# Patient Record
Sex: FEMALE | Race: WHITE | NOT HISPANIC OR LATINO | Employment: OTHER | RURAL
[De-identification: names, ages, dates, MRNs, and addresses within clinical notes are randomized per-mention and may not be internally consistent; named-entity substitution may affect disease eponyms.]

---

## 2022-12-02 ENCOUNTER — HOSPITAL ENCOUNTER (EMERGENCY)
Facility: HOSPITAL | Age: 52
Discharge: ANOTHER HOSPITAL | End: 2022-12-03
Payer: COMMERCIAL

## 2022-12-02 DIAGNOSIS — R07.9 CHEST PAIN: ICD-10-CM

## 2022-12-02 DIAGNOSIS — I21.4 NSTEMI (NON-ST ELEVATED MYOCARDIAL INFARCTION): Primary | ICD-10-CM

## 2022-12-02 LAB
BASOPHILS # BLD AUTO: 0.06 K/UL (ref 0–0.2)
BASOPHILS NFR BLD AUTO: 0.6 % (ref 0–1)
DIFFERENTIAL METHOD BLD: ABNORMAL
EOSINOPHIL # BLD AUTO: 0.2 K/UL (ref 0–0.5)
EOSINOPHIL NFR BLD AUTO: 1.9 % (ref 1–4)
ERYTHROCYTE [DISTWIDTH] IN BLOOD BY AUTOMATED COUNT: 13 % (ref 11.5–14.5)
HCT VFR BLD AUTO: 41.6 % (ref 38–47)
HGB BLD-MCNC: 14.4 G/DL (ref 12–16)
LYMPHOCYTES # BLD AUTO: 3.3 K/UL (ref 1–4.8)
LYMPHOCYTES NFR BLD AUTO: 32.1 % (ref 27–41)
MCH RBC QN AUTO: 29.6 PG (ref 27–31)
MCHC RBC AUTO-ENTMCNC: 34.6 G/DL (ref 32–36)
MCV RBC AUTO: 85.6 FL (ref 80–96)
MONOCYTES # BLD AUTO: 0.68 K/UL (ref 0–0.8)
MONOCYTES NFR BLD AUTO: 6.6 % (ref 2–6)
MPC BLD CALC-MCNC: 9.2 FL (ref 9.4–12.4)
NEUTROPHILS # BLD AUTO: 6.04 K/UL (ref 1.8–7.7)
NEUTROPHILS NFR BLD AUTO: 58.8 % (ref 53–65)
PLATELET # BLD AUTO: 325 K/UL (ref 150–400)
RBC # BLD AUTO: 4.86 M/UL (ref 4.2–5.4)
WBC # BLD AUTO: 10.28 K/UL (ref 4.5–11)

## 2022-12-02 PROCEDURE — 85025 COMPLETE CBC W/AUTO DIFF WBC: CPT | Performed by: NURSE PRACTITIONER

## 2022-12-02 PROCEDURE — 93005 ELECTROCARDIOGRAM TRACING: CPT

## 2022-12-02 PROCEDURE — 93010 EKG 12-LEAD: ICD-10-PCS | Mod: ,,, | Performed by: HOSPITALIST

## 2022-12-02 PROCEDURE — 99291 PR CRITICAL CARE, E/M 30-74 MINUTES: ICD-10-PCS | Mod: ,,, | Performed by: NURSE PRACTITIONER

## 2022-12-02 PROCEDURE — 25000003 PHARM REV CODE 250: Performed by: NURSE PRACTITIONER

## 2022-12-02 PROCEDURE — 99291 CRITICAL CARE FIRST HOUR: CPT | Mod: ,,, | Performed by: NURSE PRACTITIONER

## 2022-12-02 PROCEDURE — 93010 ELECTROCARDIOGRAM REPORT: CPT | Mod: ,,, | Performed by: HOSPITALIST

## 2022-12-02 PROCEDURE — 96360 HYDRATION IV INFUSION INIT: CPT

## 2022-12-02 PROCEDURE — 94761 N-INVAS EAR/PLS OXIMETRY MLT: CPT

## 2022-12-02 PROCEDURE — 99291 CRITICAL CARE FIRST HOUR: CPT

## 2022-12-02 RX ORDER — ASPIRIN 325 MG
325 TABLET ORAL
Status: COMPLETED | OUTPATIENT
Start: 2022-12-02 | End: 2022-12-02

## 2022-12-02 RX ADMIN — ASPIRIN 325 MG ORAL TABLET 325 MG: 325 PILL ORAL at 11:12

## 2022-12-03 ENCOUNTER — HOSPITAL ENCOUNTER (OUTPATIENT)
Facility: HOSPITAL | Age: 52
Discharge: LEFT BEFORE DISCHARGE | End: 2022-12-03
Attending: FAMILY MEDICINE | Admitting: FAMILY MEDICINE
Payer: COMMERCIAL

## 2022-12-03 VITALS
OXYGEN SATURATION: 97 % | RESPIRATION RATE: 18 BRPM | TEMPERATURE: 98 F | DIASTOLIC BLOOD PRESSURE: 64 MMHG | SYSTOLIC BLOOD PRESSURE: 95 MMHG | HEART RATE: 60 BPM | BODY MASS INDEX: 24.8 KG/M2 | WEIGHT: 140 LBS | HEIGHT: 63 IN

## 2022-12-03 VITALS
HEIGHT: 63 IN | RESPIRATION RATE: 17 BRPM | BODY MASS INDEX: 24.8 KG/M2 | SYSTOLIC BLOOD PRESSURE: 116 MMHG | DIASTOLIC BLOOD PRESSURE: 71 MMHG | OXYGEN SATURATION: 98 % | TEMPERATURE: 98 F | WEIGHT: 140 LBS | HEART RATE: 68 BPM

## 2022-12-03 DIAGNOSIS — I24.9 ACS (ACUTE CORONARY SYNDROME): ICD-10-CM

## 2022-12-03 DIAGNOSIS — R79.89 TROPONIN I ABOVE REFERENCE RANGE: ICD-10-CM

## 2022-12-03 DIAGNOSIS — I21.4 NSTEMI (NON-ST ELEVATED MYOCARDIAL INFARCTION): Primary | ICD-10-CM

## 2022-12-03 DIAGNOSIS — R07.9 CHEST PAIN: ICD-10-CM

## 2022-12-03 PROBLEM — F17.200 TOBACCO DEPENDENCE: Status: ACTIVE | Noted: 2022-12-03

## 2022-12-03 LAB
ALBUMIN SERPL BCP-MCNC: 3.5 G/DL (ref 3.5–5)
ALBUMIN/GLOB SERPL: 1 {RATIO}
ALP SERPL-CCNC: 74 U/L (ref 41–108)
ALT SERPL W P-5'-P-CCNC: 21 U/L (ref 13–56)
ANION GAP SERPL CALCULATED.3IONS-SCNC: 11 MMOL/L (ref 7–16)
AORTIC VALVE CUSP SEPERATION: 1.84 CM
APTT PPP: 26.2 SECONDS (ref 25.2–37.3)
APTT PPP: 27.9 SECONDS (ref 25.2–37.3)
APTT PPP: 40.7 SECONDS (ref 25.2–37.3)
AST SERPL W P-5'-P-CCNC: 13 U/L (ref 15–37)
BASOPHILS # BLD AUTO: 0.06 K/UL (ref 0–0.2)
BASOPHILS NFR BLD AUTO: 0.7 % (ref 0–1)
BILIRUB SERPL-MCNC: 0.2 MG/DL (ref ?–1.2)
BSA FOR ECHO PROCEDURE: 1.68 M2
BUN SERPL-MCNC: 16 MG/DL (ref 7–18)
BUN/CREAT SERPL: 17 (ref 6–20)
CALCIUM SERPL-MCNC: 8.6 MG/DL (ref 8.5–10.1)
CHLORIDE SERPL-SCNC: 104 MMOL/L (ref 98–107)
CHOLEST SERPL-MCNC: 234 MG/DL (ref 0–200)
CHOLEST/HDLC SERPL: 4.7 {RATIO}
CO2 SERPL-SCNC: 29 MMOL/L (ref 21–32)
CREAT SERPL-MCNC: 0.93 MG/DL (ref 0.55–1.02)
CV ECHO LV RWT: 0.37 CM
D DIMER PPP FEU-MCNC: <0.27 ΜG/ML (ref 0–0.47)
DIFFERENTIAL METHOD BLD: ABNORMAL
E WAVE DECELERATION TIME: 225 MSEC
ECHO LV POSTERIOR WALL: 0.77 CM (ref 0.6–1.1)
EGFR (NO RACE VARIABLE) (RUSH/TITUS): 73 ML/MIN/1.73M²
EJECTION FRACTION: 55 %
EOSINOPHIL # BLD AUTO: 0.14 K/UL (ref 0–0.5)
EOSINOPHIL NFR BLD AUTO: 1.7 % (ref 1–4)
ERYTHROCYTE [DISTWIDTH] IN BLOOD BY AUTOMATED COUNT: 13.1 % (ref 11.5–14.5)
EST. AVERAGE GLUCOSE BLD GHB EST-MCNC: 130 MG/DL
FRACTIONAL SHORTENING: 17 % (ref 28–44)
GLOBULIN SER-MCNC: 3.5 G/DL (ref 2–4)
GLUCOSE SERPL-MCNC: 118 MG/DL (ref 74–106)
HBA1C MFR BLD HPLC: 6.5 % (ref 4.5–6.6)
HCT VFR BLD AUTO: 42.3 % (ref 38–47)
HDLC SERPL-MCNC: 50 MG/DL (ref 40–60)
HGB BLD-MCNC: 13.4 G/DL (ref 12–16)
IMM GRANULOCYTES # BLD AUTO: 0.02 K/UL (ref 0–0.04)
IMM GRANULOCYTES NFR BLD: 0.2 % (ref 0–0.4)
INR BLD: 0.9
INR BLD: 0.92
INR BLD: 1.03
INTERVENTRICULAR SEPTUM: 0.82 CM (ref 0.6–1.1)
LDLC SERPL CALC-MCNC: 131 MG/DL
LDLC/HDLC SERPL: 2.6 {RATIO}
LEFT ATRIUM SIZE: 2.8 CM
LEFT INTERNAL DIMENSION IN SYSTOLE: 3.41 CM (ref 2.1–4)
LEFT VENTRICLE DIASTOLIC VOLUME INDEX: 45.23 ML/M2
LEFT VENTRICLE DIASTOLIC VOLUME: 75.08 ML
LEFT VENTRICLE MASS INDEX: 59 G/M2
LEFT VENTRICLE SYSTOLIC VOLUME INDEX: 28.8 ML/M2
LEFT VENTRICLE SYSTOLIC VOLUME: 47.77 ML
LEFT VENTRICULAR INTERNAL DIMENSION IN DIASTOLE: 4.12 CM (ref 3.5–6)
LEFT VENTRICULAR MASS: 97.31 G
LYMPHOCYTES # BLD AUTO: 2.18 K/UL (ref 1–4.8)
LYMPHOCYTES NFR BLD AUTO: 26.4 % (ref 27–41)
MAGNESIUM SERPL-MCNC: 1.8 MG/DL (ref 1.7–2.3)
MCH RBC QN AUTO: 28.9 PG (ref 27–31)
MCHC RBC AUTO-ENTMCNC: 31.7 G/DL (ref 32–36)
MCV RBC AUTO: 91.2 FL (ref 80–96)
MONOCYTES # BLD AUTO: 0.42 K/UL (ref 0–0.8)
MONOCYTES NFR BLD AUTO: 5.1 % (ref 2–6)
MPC BLD CALC-MCNC: 9.4 FL (ref 9.4–12.4)
MV PEAK E VEL: 0.77 M/S
NEUTROPHILS # BLD AUTO: 5.44 K/UL (ref 1.8–7.7)
NEUTROPHILS NFR BLD AUTO: 65.9 % (ref 53–65)
NONHDLC SERPL-MCNC: 184 MG/DL
NRBC # BLD AUTO: 0 X10E3/UL
NRBC, AUTO (.00): 0 %
NT-PROBNP SERPL-MCNC: 1117 PG/ML (ref 1–125)
PISA TR MAX VEL: 3.16 M/S
PLATELET # BLD AUTO: 329 K/UL (ref 150–400)
POTASSIUM SERPL-SCNC: 3.5 MMOL/L (ref 3.5–5.1)
PROT SERPL-MCNC: 7 G/DL (ref 6.4–8.2)
PROTHROMBIN TIME: 12 SECONDS (ref 11.7–14.7)
PROTHROMBIN TIME: 12.1 SECONDS (ref 11.7–14.7)
PROTHROMBIN TIME: 13.1 SECONDS (ref 11.7–14.7)
RBC # BLD AUTO: 4.64 M/UL (ref 4.2–5.4)
RIGHT ATRIAL AREA: 6.6 CM2
RIGHT VENTRICULAR END-DIASTOLIC DIMENSION: 2.45 CM
SARS-COV+SARS-COV-2 AG RESP QL IA.RAPID: NEGATIVE
SODIUM SERPL-SCNC: 140 MMOL/L (ref 136–145)
TR MAX PG: 40 MMHG
TRIGL SERPL-MCNC: 264 MG/DL (ref 35–150)
TROPONIN I SERPL HS-MCNC: 241.2 PG/ML
TROPONIN I SERPL HS-MCNC: 2747.9 PG/ML
TROPONIN I SERPL HS-MCNC: 487.8 PG/ML
TSH SERPL DL<=0.005 MIU/L-ACNC: 2.34 UIU/ML (ref 0.36–3.74)
VLDLC SERPL-MCNC: 53 MG/DL
WBC # BLD AUTO: 8.26 K/UL (ref 4.5–11)

## 2022-12-03 PROCEDURE — 93010 EKG 12-LEAD: ICD-10-PCS | Mod: ,,, | Performed by: HOSPITALIST

## 2022-12-03 PROCEDURE — 85025 COMPLETE CBC W/AUTO DIFF WBC: CPT | Performed by: EMERGENCY MEDICINE

## 2022-12-03 PROCEDURE — 80061 LIPID PANEL: CPT | Performed by: NURSE PRACTITIONER

## 2022-12-03 PROCEDURE — 85610 PROTHROMBIN TIME: CPT | Mod: 91 | Performed by: FAMILY MEDICINE

## 2022-12-03 PROCEDURE — 99219 PR INITIAL OBSERVATION CARE,LEVL II: CPT | Mod: ,,, | Performed by: FAMILY MEDICINE

## 2022-12-03 PROCEDURE — G0378 HOSPITAL OBSERVATION PER HR: HCPCS

## 2022-12-03 PROCEDURE — 87426 SARSCOV CORONAVIRUS AG IA: CPT | Performed by: EMERGENCY MEDICINE

## 2022-12-03 PROCEDURE — 85610 PROTHROMBIN TIME: CPT | Performed by: NURSE PRACTITIONER

## 2022-12-03 PROCEDURE — 96375 TX/PRO/DX INJ NEW DRUG ADDON: CPT | Mod: 59

## 2022-12-03 PROCEDURE — 36415 COLL VENOUS BLD VENIPUNCTURE: CPT | Performed by: FAMILY MEDICINE

## 2022-12-03 PROCEDURE — 25000003 PHARM REV CODE 250: Performed by: FAMILY MEDICINE

## 2022-12-03 PROCEDURE — 25000003 PHARM REV CODE 250: Performed by: EMERGENCY MEDICINE

## 2022-12-03 PROCEDURE — 83880 ASSAY OF NATRIURETIC PEPTIDE: CPT | Performed by: FAMILY MEDICINE

## 2022-12-03 PROCEDURE — 80053 COMPREHEN METABOLIC PANEL: CPT | Performed by: NURSE PRACTITIONER

## 2022-12-03 PROCEDURE — 96374 THER/PROPH/DIAG INJ IV PUSH: CPT | Mod: 59

## 2022-12-03 PROCEDURE — 99285 EMERGENCY DEPT VISIT HI MDM: CPT | Mod: 25

## 2022-12-03 PROCEDURE — 99284 PR EMERGENCY DEPT VISIT,LEVEL IV: ICD-10-PCS | Mod: CS,,, | Performed by: FAMILY MEDICINE

## 2022-12-03 PROCEDURE — 93010 ELECTROCARDIOGRAM REPORT: CPT | Mod: ,,, | Performed by: HOSPITALIST

## 2022-12-03 PROCEDURE — 36415 COLL VENOUS BLD VENIPUNCTURE: CPT | Performed by: EMERGENCY MEDICINE

## 2022-12-03 PROCEDURE — 85379 FIBRIN DEGRADATION QUANT: CPT | Performed by: NURSE PRACTITIONER

## 2022-12-03 PROCEDURE — 84484 ASSAY OF TROPONIN QUANT: CPT | Performed by: EMERGENCY MEDICINE

## 2022-12-03 PROCEDURE — 84484 ASSAY OF TROPONIN QUANT: CPT | Performed by: NURSE PRACTITIONER

## 2022-12-03 PROCEDURE — 93005 ELECTROCARDIOGRAM TRACING: CPT

## 2022-12-03 PROCEDURE — 25000003 PHARM REV CODE 250: Performed by: NURSE PRACTITIONER

## 2022-12-03 PROCEDURE — 99284 EMERGENCY DEPT VISIT MOD MDM: CPT | Mod: CS,,, | Performed by: FAMILY MEDICINE

## 2022-12-03 PROCEDURE — 85730 THROMBOPLASTIN TIME PARTIAL: CPT | Performed by: EMERGENCY MEDICINE

## 2022-12-03 PROCEDURE — 99219 PR INITIAL OBSERVATION CARE,LEVL II: ICD-10-PCS | Mod: ,,, | Performed by: FAMILY MEDICINE

## 2022-12-03 PROCEDURE — 85730 THROMBOPLASTIN TIME PARTIAL: CPT | Performed by: NURSE PRACTITIONER

## 2022-12-03 PROCEDURE — 83735 ASSAY OF MAGNESIUM: CPT | Performed by: NURSE PRACTITIONER

## 2022-12-03 PROCEDURE — 84443 ASSAY THYROID STIM HORMONE: CPT | Performed by: FAMILY MEDICINE

## 2022-12-03 PROCEDURE — 85730 THROMBOPLASTIN TIME PARTIAL: CPT | Mod: 91 | Performed by: FAMILY MEDICINE

## 2022-12-03 PROCEDURE — 63600175 PHARM REV CODE 636 W HCPCS: Performed by: EMERGENCY MEDICINE

## 2022-12-03 PROCEDURE — 83036 HEMOGLOBIN GLYCOSYLATED A1C: CPT | Performed by: FAMILY MEDICINE

## 2022-12-03 PROCEDURE — 85610 PROTHROMBIN TIME: CPT | Performed by: EMERGENCY MEDICINE

## 2022-12-03 RX ORDER — ACETAMINOPHEN 325 MG/1
650 TABLET ORAL EVERY 6 HOURS PRN
Status: DISCONTINUED | OUTPATIENT
Start: 2022-12-03 | End: 2022-12-03 | Stop reason: HOSPADM

## 2022-12-03 RX ORDER — NAPROXEN SODIUM 220 MG/1
81 TABLET, FILM COATED ORAL DAILY
Status: DISCONTINUED | OUTPATIENT
Start: 2022-12-04 | End: 2022-12-03 | Stop reason: HOSPADM

## 2022-12-03 RX ORDER — HEPARIN SODIUM,PORCINE/D5W 25000/250
0-40 INTRAVENOUS SOLUTION INTRAVENOUS CONTINUOUS
Status: DISCONTINUED | OUTPATIENT
Start: 2022-12-03 | End: 2022-12-03 | Stop reason: HOSPADM

## 2022-12-03 RX ORDER — LIDOCAINE HYDROCHLORIDE 20 MG/ML
15 SOLUTION OROPHARYNGEAL ONCE
Status: COMPLETED | OUTPATIENT
Start: 2022-12-03 | End: 2022-12-03

## 2022-12-03 RX ORDER — MAG HYDROX/ALUMINUM HYD/SIMETH 200-200-20
30 SUSPENSION, ORAL (FINAL DOSE FORM) ORAL ONCE
Status: COMPLETED | OUTPATIENT
Start: 2022-12-03 | End: 2022-12-03

## 2022-12-03 RX ORDER — TALC
9 POWDER (GRAM) TOPICAL NIGHTLY PRN
Status: DISCONTINUED | OUTPATIENT
Start: 2022-12-03 | End: 2022-12-03 | Stop reason: HOSPADM

## 2022-12-03 RX ORDER — CARVEDILOL 3.12 MG/1
3.12 TABLET ORAL 2 TIMES DAILY
Status: DISCONTINUED | OUTPATIENT
Start: 2022-12-03 | End: 2022-12-03 | Stop reason: HOSPADM

## 2022-12-03 RX ORDER — SODIUM CHLORIDE 9 MG/ML
1000 INJECTION, SOLUTION INTRAVENOUS
Status: COMPLETED | OUTPATIENT
Start: 2022-12-03 | End: 2022-12-03

## 2022-12-03 RX ORDER — SODIUM CHLORIDE 9 MG/ML
1000 INJECTION, SOLUTION INTRAVENOUS
Status: DISCONTINUED | OUTPATIENT
Start: 2022-12-03 | End: 2022-12-03

## 2022-12-03 RX ORDER — TRAZODONE HYDROCHLORIDE 50 MG/1
50 TABLET ORAL NIGHTLY PRN
Status: DISCONTINUED | OUTPATIENT
Start: 2022-12-03 | End: 2022-12-03 | Stop reason: HOSPADM

## 2022-12-03 RX ORDER — ATORVASTATIN CALCIUM 80 MG/1
80 TABLET, FILM COATED ORAL DAILY
Status: DISCONTINUED | OUTPATIENT
Start: 2022-12-03 | End: 2022-12-03 | Stop reason: HOSPADM

## 2022-12-03 RX ORDER — ONDANSETRON 2 MG/ML
4 INJECTION INTRAMUSCULAR; INTRAVENOUS ONCE
Status: COMPLETED | OUTPATIENT
Start: 2022-12-03 | End: 2022-12-03

## 2022-12-03 RX ADMIN — ONDANSETRON 4 MG: 2 INJECTION INTRAMUSCULAR; INTRAVENOUS at 03:12

## 2022-12-03 RX ADMIN — ATORVASTATIN CALCIUM 80 MG: 80 TABLET, FILM COATED ORAL at 12:12

## 2022-12-03 RX ADMIN — ALUMINUM HYDROXIDE, MAGNESIUM HYDROXIDE, AND SIMETHICONE 30 ML: 200; 200; 20 SUSPENSION ORAL at 03:12

## 2022-12-03 RX ADMIN — LIDOCAINE HYDROCHLORIDE 15 ML: 20 SOLUTION ORAL at 03:12

## 2022-12-03 RX ADMIN — CARVEDILOL 3.12 MG: 3.12 TABLET, FILM COATED ORAL at 12:12

## 2022-12-03 RX ADMIN — SODIUM CHLORIDE 500 ML: 9 INJECTION, SOLUTION INTRAVENOUS at 01:12

## 2022-12-03 RX ADMIN — HEPARIN SODIUM 12 UNITS/KG/HR: 10000 INJECTION, SOLUTION INTRAVENOUS at 05:12

## 2022-12-03 RX ADMIN — SODIUM CHLORIDE 1000 ML: 9 INJECTION, SOLUTION INTRAVENOUS at 02:12

## 2022-12-03 NOTE — H&P
"[facility] - Emergency Department  Hospital Medicine  History & Physical    Patient Name: Johana Higgins  MRN: [id number]  Patient Class: OP- Observation  Admission Date: 12/3/2022  Attending Physician: Sesar Cordero Jr., MD   Primary Care Provider: Primary Doctor No         Patient information was obtained from patient, relative(s) and ER records.     Subjective:     Principal Problem:ACS (acute coronary syndrome)    Chief Complaint:   Chief Complaint   Patient presents with    Chest Pain        HPI: Pt is a 52 y.o. female presenting with 2 nights of chest pressure. She describes an " aggravating" sensation radiaiting from her L ear into her L armpit and across her chest.Patient reports that over the last week she has been taking more Gax X pills thinking she had gastric reflux and if she belched she would feel better. Patient reports the sensation to want to belch never subsided and she started to feel more pressure in her chest keeping her up at night. She reports some associated nausea, diaphoresis, dyspnea, orthopnea but denies PND defainting spells 2 years ago She denies HA, blurry vision, dysphagia, or diarrhea.    Pt has no known medical problems and takes no daily medication. Pt is has been a 1 ppd smoker since the age of 12. She denies EtOH consumption. She occasionally smokes marijuana. 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 Patient is very independent and use to carrying out all MRADLs with no assistance.    Workup thus far has been significant for the following: Initial Vitals T. 97.9, /68, P 70, RR 18 O2 96%. Initial troponin was 241.2; subsequent troponins were 487.8 followed by 2,747.9. EKG reveals sinus rhythm with a rate of 66.  CXR reveals no acute cardiopulmonary process. CBC and CMP are grossly nl.     Pt is being admitted to the hospital medicine service under attending physician Dr. Cordero for the evaluation and management of NSTE-ACS. "       History reviewed. No pertinent past medical history.    History reviewed. No pertinent surgical history.    Review of patient's allergies indicates:   Allergen Reactions    Penicillin Hives       Current Facility-Administered Medications on File Prior to Encounter   Medication    [COMPLETED] 0.9%  NaCl infusion    [COMPLETED] aspirin tablet 325 mg    [COMPLETED] sodium chloride 0.9% bolus 500 mL    [DISCONTINUED] 0.9%  NaCl infusion     No current outpatient medications on file prior to encounter.     Family History       Problem Relation (Age of Onset)    Heart attack Father, Brother, Maternal Grandfather    Heart disease Paternal Grandfather          Tobacco Use    Smoking status: Every Day     Types: Cigarettes    Smokeless tobacco: Not on file   Substance and Sexual Activity    Alcohol use: Never    Drug use: Yes     Types: Marijuana    Sexual activity: Yes     Review of Systems   Constitutional:  Negative for fatigue and fever.   HENT:  Negative for congestion, facial swelling, hearing loss and mouth sores.    Eyes:  Negative for pain, discharge and itching.   Respiratory:  Positive for shortness of breath. Negative for cough.    Cardiovascular:  Positive for chest pain. Negative for leg swelling.   Gastrointestinal:  Negative for abdominal distention, abdominal pain, anal bleeding, constipation and diarrhea.   Endocrine: Negative for cold intolerance, heat intolerance and polydipsia.   Genitourinary:  Negative for difficulty urinating, dyspareunia, enuresis and flank pain.   Musculoskeletal: Negative.    Skin: Negative.    Allergic/Immunologic: Negative.    Neurological:  Negative for light-headedness and headaches.   Hematological: Negative.    Psychiatric/Behavioral:  Negative for agitation, behavioral problems and confusion.    Objective:     Vital Signs (Most Recent):  Temp: 97.9 °F (36.6 °C) (12/03/22 0339)  Pulse: 68 (12/03/22 0619)  Resp: 17 (12/03/22 0619)  BP: 116/71 (12/03/22  0619)  SpO2: 98 % (12/03/22 0619) Vital Signs (24h Range):  Temp:  [97.8 °F (36.6 °C)-97.9 °F (36.6 °C)] 97.9 °F (36.6 °C)  Pulse:  [56-89] 68  Resp:  [15-18] 17  SpO2:  [95 %-98 %] 98 %  BP: ()/(50-98) 116/71     Weight: 63.5 kg (140 lb)  Body mass index is 24.8 kg/m².    Physical Exam  Constitutional:       Appearance: She is normal weight.   HENT:      Head: Normocephalic.      Right Ear: Tympanic membrane normal.      Left Ear: Tympanic membrane normal.      Nose: Nose normal.      Mouth/Throat:      Mouth: Mucous membranes are moist.      Pharynx: Oropharynx is clear.   Eyes:      Conjunctiva/sclera: Conjunctivae normal.      Pupils: Pupils are equal, round, and reactive to light.   Cardiovascular:      Rate and Rhythm: Normal rate and regular rhythm.      Pulses: Normal pulses.      Heart sounds: Normal heart sounds.   Pulmonary:      Effort: Pulmonary effort is normal.      Breath sounds: Normal breath sounds.   Abdominal:      General: Abdomen is flat. Bowel sounds are normal.   Musculoskeletal:         General: Normal range of motion.      Cervical back: Normal range of motion.   Skin:     General: Skin is warm and dry.      Capillary Refill: Capillary refill takes less than 2 seconds.   Neurological:      Mental Status: She is alert and oriented to person, place, and time.   Psychiatric:         Mood and Affect: Mood normal.         CRANIAL NERVES     CN III, IV, VI   Pupils are equal, round, and reactive to light.     Significant Labs: All pertinent labs within the past 24 hours have been reviewed.  BMP:   Recent Labs   Lab 12/03/22  0003   *      K 3.5      CO2 29   BUN 16   CREATININE 0.93   CALCIUM 8.6   MG 1.8     CBC:   Recent Labs   Lab 12/02/22  2351 12/03/22  0359   WBC 10.28 8.26   HGB 14.4 13.4   HCT 41.6 42.3    329       Significant Imaging: I have reviewed all pertinent imaging results/findings within the past 24 hours.    Assessment/Plan:     * NSTE-ACS  GREGORY  Score-82 points: 2% probability of death from admission to 6 months; HEART Score of 7 points-Risk of MACE 50-65%  Cardio consult placed; recs appreciated   Heparin infusion, Beta Blocker, statin, ASA    ECHO  Telemetry   Npo for now; awaiting cardio recs   PT/OT evaluation and tx         Tobacco dependence  Smoking Cessation information given        VTE Risk Mitigation (From admission, onward)         Ordered     heparin 25,000 units in dextrose 5% 250 mL (100 units/mL) infusion LOW INTENSITY nomogram - [facility]  Continuous        Question:  Begin at (in units/kg/hr)  Answer:  12    12/03/22 0438     heparin 25,000 units in dextrose 5% (100 units/ml) IV bolus from bag - ADDITIONAL PRN BOLUS - 30 units/kg (max bolus 4000 units)  As needed (PRN)        Question:  Heparin Infusion Adjustment (DO NOT MODIFY ANSWER)  Answer:  \\[facility]\epic\Images\Pharmacy\HeparinInfusions\heparin LOW INTENSITY nomogram for [place] [id number]    12/03/22 0438     heparin 25,000 units in dextrose 5% (100 units/ml) IV bolus from bag ADDITIONAL PRN BOLUS - 60 units/kg (max bolus 4000 units)  As needed (PRN)        Question:  Heparin Infusion Adjustment (DO NOT MODIFY ANSWER)  Answer:  \\[facility]\epic\Images\Pharmacy\HeparinInfusions\heparin LOW INTENSITY nomogram for [place] [id number].pdf    12/03/22 0438                   Jesús Currie MD  Department of Hospital Medicine   [facility] - Emergency Department

## 2022-12-03 NOTE — ED PROVIDER NOTES
Encounter Date: 12/2/2022       History     Chief Complaint   Patient presents with    Chest Pain     52 year old  female presents to the ER for evaluation for chest pain, SOB, nausea and sweats.  Pt reports she first noted these symptoms when she was sitting at home last night.  She reports the symptoms went away.  She went to work today, and when she got home tonight, the symptoms returned.  She described the chest pain as tightness under her left arm that radiates across her chest, into the left side of her neck and left ear.     The history is provided by the patient.   Chest Pain  Chest pain occurs intermittently. The chest pain is unchanged. The quality of the pain is described as tightness. The pain radiates to the left neck (left ear). Primary symptoms include shortness of breath, nausea and dizziness. Pertinent negatives for primary symptoms include no fever, no fatigue, no syncope, no cough, no wheezing, no palpitations, no abdominal pain, no vomiting and no altered mental status.   The patient's medical history does not include CHF, COPD, asthma or chronic lung disease.   Dizziness also occurs with nausea and diaphoresis. Dizziness does not occur with blurred vision, tinnitus, hearing loss, vomiting or weakness.   Associated symptoms include diaphoresis.   Pertinent negatives for associated symptoms include no claudication, no lower extremity edema, no near-syncope, no numbness, no orthopnea and no weakness. She tried nothing for the symptoms.   Pertinent negatives for past medical history include no aneurysm, no anxiety/panic attacks, no aortic aneurysm, no aortic dissection, no arrhythmia, no bicuspid aortic valve, no CAD, no cancer, no congenital heart disease, no connective tissue disease, no COPD, no CHF, no diabetes, no DVT, no hyperlipidemia, no hypertension, no Marfan's syndrome, no MI, no mitral valve prolapse, no pacemaker, no PE, no PVD, no recent injury, no rheumatic fever, no  seizures, no sickle cell disease, no sleep apnea, no spontaneous pneumothorax, no stimulant use, no strokes, no thyroid problem, no TIA and no valve disorder.   Procedure history is negative for cardiac catheterization, echocardiogram, persantine thallium, stress echo, stress thallium, exercise treadmill test and coronary CTA.   Review of patient's allergies indicates:   Allergen Reactions    Penicillin Hives     History reviewed. No pertinent past medical history.  No past surgical history on file.  History reviewed. No pertinent family history.     Review of Systems   Constitutional:  Positive for diaphoresis. Negative for fatigue and fever.   HENT:  Negative for tinnitus.    Eyes:  Negative for blurred vision.   Respiratory:  Positive for shortness of breath. Negative for cough and wheezing.    Cardiovascular:  Positive for chest pain. Negative for palpitations, orthopnea, claudication, leg swelling, syncope and near-syncope.   Gastrointestinal:  Positive for nausea. Negative for abdominal pain and vomiting.   Neurological:  Positive for dizziness. Negative for seizures, weakness and numbness.     Physical Exam     Initial Vitals [12/02/22 2333]   BP Pulse Resp Temp SpO2   (!) 132/98 89 18 97.8 °F (36.6 °C) 96 %      MAP       --         Physical Exam    Nursing note and vitals reviewed.  Constitutional: She appears well-developed and well-nourished. She is not diaphoretic. She is cooperative.  Non-toxic appearance. No distress.   HENT:   Head: Normocephalic and atraumatic.   Eyes: Pupils are equal, round, and reactive to light.   Neck: Neck supple.   Cardiovascular:  Normal rate, regular rhythm, S1 normal, S2 normal, normal heart sounds, intact distal pulses and normal pulses.     Exam reveals no gallop, no distant heart sounds and no friction rub.       No murmur heard.  Pulmonary/Chest: Breath sounds normal. No respiratory distress. She has no wheezes. She has no rhonchi. She has no rales. She exhibits no  tenderness.   Musculoskeletal:      Cervical back: Neck supple.     Neurological: She is alert and oriented to person, place, and time.   Skin: Skin is warm and dry. Capillary refill takes less than 2 seconds.   Psychiatric: She has a normal mood and affect.       Medical Screening Exam   See Full Note    ED Course   Procedures  Labs Reviewed   COMPREHENSIVE METABOLIC PANEL - Abnormal; Notable for the following components:       Result Value    Glucose 118 (*)     AST 13 (*)     All other components within normal limits   TROPONIN I - Abnormal; Notable for the following components:    Troponin I High Sensitivity 241.2 (*)     All other components within normal limits   CBC WITH DIFFERENTIAL - Abnormal; Notable for the following components:    MPV 9.2 (*)     Monocytes % 6.6 (*)     All other components within normal limits   TROPONIN I - Abnormal; Notable for the following components:    Troponin I High Sensitivity 487.8 (*)     All other components within normal limits   MAGNESIUM - Normal   APTT - Normal   PROTIME-INR - Normal   D DIMER, QUANTITATIVE - Normal   CBC W/ AUTO DIFFERENTIAL    Narrative:     The following orders were created for panel order CBC auto differential.  Procedure                               Abnormality         Status                     ---------                               -----------         ------                     CBC with Differential[[id number]]        Abnormal            Final result                 Please view results for these tests on the individual orders.   LIPID PANEL     EKG Readings: (Independently Interpreted)   Initial Reading: No STEMI. Rhythm: Normal Sinus Rhythm. Heart Rate: 86. Ectopy: No Ectopy. Conduction: Normal. ST Segments: Normal ST Segments. T Waves: Normal. Clinical Impression: Normal Sinus Rhythm   ECG Results              EKG 12-lead (In process)  Result time 12/02/22 23:58:42      In process by Interface, Lab In [facility] (12/02/22 23:58:42)                    Narrative:    Test Reason : R07.9,    Vent. Rate : 086 BPM     Atrial Rate : 086 BPM     P-R Int : 130 ms          QRS Dur : 078 ms      QT Int : 384 ms       P-R-T Axes : 076 073 093 degrees     QTc Int : 459 ms    Normal sinus rhythm  Nonspecific ST abnormality  Abnormal ECG  No previous ECGs available    Referred By:             Confirmed By:                                   Imaging Results              X-Ray Chest AP Portable (Preliminary result)  Result time 12/03/22 00:20:22      ED Interpretation by DIVYA Kirk (12/03/22 00:20:22, [facility] - Emergency Department, Emergency Medicine)    No acute chest process per VR                                     Medications   aspirin tablet 325 mg (325 mg Oral Given 12/2/22 4214)   sodium chloride 0.9% bolus 500 mL (0 mLs Intravenous Stopped 12/3/22 0215)   0.9%  NaCl infusion (1,000 mLs Intravenous New Bag 12/3/22 0214)        Additional MDM:   Heart Score:    History:          Highly suspicious.  ECG:             Normal  Age:               45-65 years  Risk factors: no risk factors known  Troponin:       >2x normal limit  Final Score: 5      ALICE Score:   Age over 65:                                    0.00   > or = to 3 CAD risk factors:           0.00  Established CAD:                            0.00  > or = to 2 anginal events in the past 24 hours: 1.00  Use of ASA in past 7 days:              0.00  Elevated Enzymes:                         1.00  ST Depression > or = to 0.05 mV:  0.00  ALICE score: 2    Heart Failure Score:   COPD = No       Attending Attestation:         Attending Critical Care:   Critical Care Times:   Direct Patient Care (initial evaluation, reassessments, and time considering the case)................................................................20 minutes.   Additional History from reviewing old medical records or taking additional history from the family, EMS, PCP, etc.......................10 minutes.   Ordering,  Reviewing, and Interpreting Diagnostic Studies...............................................................................................................10 minutes.   Documentation..................................................................................................................................................................................10 minutes.   Consultation with other Physicians. .................................................................................................................................................10 minutes.   Consultation with the patient's family directly relating to the patient's condition, care, and DNR status (when patient unable)......0 minutes.   Other..................................................................................................................................................................................................0 minutes.   ==============================================================  Total Critical Care Time - exclusive of procedural time: 60 minutes.  ==============================================================  Critical care was necessary to treat or prevent imminent or life-threatening deterioration of the following conditions: myocardial infarction.   Critical care was time spent personally by me on the following activities: obtaining history from patient or relative, examination of patient, review of x-rays / CT sent with the patient, ordering lab, x-rays, and/or EKG, development of treatment plan with patient or relative, discussion with consultants, interpretation of cardiac measurements and re-evaluation of patient's conition.         ED Course as of 12/03/22 0244   Sat Dec 03, 2022   0033 Troponin I High Sensitivity(!!): 240.9 [AG]   0035 Pt reports minimal chest pain at this time.  Pt will need admission and cardiology consult.  Will get telemed consult, then plans to transfer to [facility].  [AG]   0042  Troponin I High Sensitivity(!!): 241.2 [AG]   0111 Telemed consult with Dr Day.  He agrees with the plan to transfer to facility for cardiology consult.  [facility] order placed.  [AG]   0136 Dr Díaz has accepted the patient to [facility]. Will wait on them to call back with the bed assignment.  Ordered placed per MD request for repeat and additional lab, IVF bolus and IV infusion.  [AG]   0206 BP(!): 71/50  IV bolus infusing at this time. I have sent a secure chat to Dr Díaz and [facility] to see about changing her from a tele med/surg bed to possibly a unit bed to be closer monitored.  EMS is on the way to get her for transport.  [AG]   0208 Troponin I High Sensitivity(!!): 487.8 [AG]   0215 Current blood pressure 87/54 [AG]   0226 Spoke with Dr Díaz.  Due to the patient's low blood pressure, we feel the patient could benefit from being admitted to ICU, to be closer monitored. [facility] is in the secure chat and reports there are no unit beds available.  Will possibly look at transferring to facility with ICU beds available.   [AG]   0235 [facility] called to report that she called the ER and spoke with Dr Finch.  She reports Dr Finch has accepted the patient into the ER.  [facility] reports Dr Finch asked that we speak with cardiology on call, in case the patient needs to go to the cath lab.  [facility] attempted to called Dr Cotter, no answer.  [AG]      ED Course User Index  [AG] DIVYA Kirk          Clinical Impression:   Final diagnoses:  [R07.9] Chest pain  [I21.4] NSTEMI (non-ST elevated myocardial infarction) (Primary)        ED Disposition Condition    Transfer to Another Facility Stable                DIVYA Kirk  12/03/22 0140       DIVYA Kirk  12/03/22 0245

## 2022-12-03 NOTE — ED PROVIDER NOTES
Encounter Date: 12/3/2022       History     Chief Complaint   Patient presents with    Chest Pain     A 52-year-old female patient who is brought to the emergency department via EMS as a transfer from at [facility] emergency department.  The patient presented there with left-sided chest pain radiating to the neck and the back .  The pain was associated with diaphoresis and shortness of breath.  Her troponin was elevated and the providing nurse practitioner there consulted with Dr. Schirmer the telemedicine at [facility] who suggested a transfer to our facility for cardiology evaluation..  Please refer to the history and physical examination findings of the note at [facility] emergency department for further information about the presenting signs and symptoms.  In our department the patient arrived without any acute chest pain.  There is no shortness of breath or diaphoresis.    The history is provided by the patient. No  was used.   Review of patient's allergies indicates:   Allergen Reactions    Penicillin Hives     History reviewed. No pertinent past medical history.  Past Surgical History:   Procedure Laterality Date    ANGIOGRAM, CORONARY, WITH LEFT HEART CATHETERIZATION N/A 12/6/2022    Procedure: Angiogram, Coronary, with Left Heart Cath;  Surgeon: Dennys Barakat MD;  Location: [facility] CATH LAB;  Service: Cardiology;  Laterality: N/A;     Family History   Problem Relation Age of Onset    Heart attack Father     Heart attack Brother     Heart attack Maternal Grandfather     Heart disease Paternal Grandfather      Social History     Tobacco Use    Smoking status: Every Day     Types: Cigarettes   Substance Use Topics    Alcohol use: Never    Drug use: Yes     Types: Marijuana     Review of Systems   Constitutional:  Negative for fever.   HENT:  Negative for sore throat.    Respiratory:  Negative for shortness of breath.    Cardiovascular:  Negative for chest pain.   Gastrointestinal:   Negative for nausea.   Genitourinary:  Negative for dysuria.   Musculoskeletal:  Negative for back pain.   Skin:  Negative for rash.   Neurological:  Negative for weakness.   Hematological:  Does not bruise/bleed easily.     Physical Exam     Initial Vitals   BP Pulse Resp Temp SpO2   12/03/22 0334 12/03/22 0334 12/03/22 0339 12/03/22 0339 12/03/22 0334   111/68 70 18 97.9 °F (36.6 °C) 96 %      MAP       --                Physical Exam    Nursing note and vitals reviewed.  Constitutional: She appears well-developed and well-nourished.   HENT:   Head: Normocephalic and atraumatic.   Eyes: EOM are normal. Pupils are equal, round, and reactive to light.   Neck: Neck supple. No thyromegaly present.   Normal range of motion.  Cardiovascular:  Normal rate, regular rhythm, normal heart sounds and intact distal pulses.           No murmur heard.  Pulmonary/Chest: Breath sounds normal. She has no wheezes.   Abdominal: Abdomen is soft. Bowel sounds are normal. There is no abdominal tenderness.   Musculoskeletal:         General: No tenderness or edema. Normal range of motion.      Cervical back: Normal range of motion and neck supple.     Lymphadenopathy:     She has no cervical adenopathy.   Neurological: She is alert and oriented to person, place, and time. She has normal strength and normal reflexes. No cranial nerve deficit or sensory deficit. GCS score is 15. GCS eye subscore is 4. GCS verbal subscore is 5. GCS motor subscore is 6.   Skin: Skin is warm and dry. Capillary refill takes less than 2 seconds. No rash noted.   Psychiatric: She has a normal mood and affect.       Medical Screening Exam   See Full Note    ED Course   Procedures  Labs Reviewed   TROPONIN I - Abnormal; Notable for the following components:       Result Value    Troponin I High Sensitivity 2,747.9 (*)     All other components within normal limits   CBC WITH DIFFERENTIAL - Abnormal; Notable for the following components:    MCHC 31.7 (*)      Neutrophils % 65.9 (*)     Lymphocytes % 26.4 (*)     All other components within normal limits   NT-PRO NATRIURETIC PEPTIDE - Abnormal; Notable for the following components:    ProBNP 1,117 (*)     All other components within normal limits   PT AND PTT - Abnormal; Notable for the following components:    PTT 40.7 (*)     All other components within normal limits   APTT - Normal   PROTIME-INR - Normal   SARS ANTIGEN(JOYCE) - Normal    Narrative:     Negative SARS-CoV results should not be used as the sole basis for treatment or patient management decisions; negative results should be considered in the context of a patient's recent exposures, history and the presene of clinical signs and symptoms consistent with COVID-19.  Negative results should be treated as presumptive and confirmed by molecular assay, if necessary for patient management.   TSH - Normal   CBC W/ AUTO DIFFERENTIAL    Narrative:     The following orders were created for panel order CBC auto differential.  Procedure                               Abnormality         Status                     ---------                               -----------         ------                     CBC with Differential[[id number]]        Abnormal            Final result                 Please view results for these tests on the individual orders.   HEMOGLOBIN A1C        ECG Results              EKG 12-lead (Final result)  Result time 12/03/22 17:35:19      Final result by Interface, Lab In [facility] (12/03/22 17:35:19)                   Narrative:    Test Reason : R07.9,    Vent. Rate : 072 BPM     Atrial Rate : 000 BPM     P-R Int : 126 ms          QRS Dur : 074 ms      QT Int : 388 ms       P-R-T Axes : 079 076 085 degrees     QTc Int : 409 ms    Sinus rhythm  Possible sequence error: V1,V2 omitted  Anterolateral T wave abnormality  is nonspecific  Borderline ECG    Confirmed by Arjun CHAMBERLAIN, Christiane ARRIETA ([id number]) on 12/3/2022 5:35:15 PM    Referred By: SERGO MONTERO            Confirmed By:Christiane Díaz MD                                     EKG 12-lead (Final result)  Result time 12/03/22 17:35:29      Final result by Interface, Lab In [facility] (12/03/22 17:35:29)                   Narrative:    Test Reason : R07.9,    Vent. Rate : 066 BPM     Atrial Rate : 000 BPM     P-R Int : 126 ms          QRS Dur : 076 ms      QT Int : 418 ms       P-R-T Axes : 079 073 083 degrees     QTc Int : 429 ms    Sinus rhythm  Poor R wave progression  Low QRS voltages in precordial leads  Borderline ECG    Confirmed by Arjun CHAMBERLAIN, Christiane ARRIETA ([id number]) on 12/3/2022 5:35:24 PM    Referred By: SERGO MONTERO           Confirmed By:Christiane Díaz MD                                  Imaging Results              X-Ray Chest AP Portable (Final result)  Result time 12/03/22 08:44:14      Final result by Jesús Borges MD (12/03/22 08:44:14)                   Impression:      No acute cardiopulmonary process      Electronically signed by: Jesús Borges  Date:    12/03/2022  Time:    08:44               Narrative:    EXAMINATION:  XR CHEST AP PORTABLE    CLINICAL HISTORY:  .  Chest pain, unspecified    COMPARISON:  December 2, 2022    TECHNIQUE:  Chest x-ray AP portable semi erect    FINDINGS:  The cardiac silhouette is not enlarged.  There is no mediastinal mass.  Pulmonary vasculature is not grossly engorged.    Lungs are well expanded and generally clear.  There is no gross pleural effusion.    Osseous structures are similar                                       Medications   aluminum-magnesium hydroxide-simethicone 200-200-20 mg/5 mL suspension 30 mL (30 mLs Oral Given 12/3/22 1042)     And   LIDOcaine HCl 2% oral solution 15 mL (15 mLs Oral Given 12/3/22 6524)   ondansetron injection 4 mg (4 mg Intravenous Given 12/3/22 3688)     Medical Decision Making:   ED Management:  06:00: I received care from Dr. Finch. Pt's troponin is severely elevated. She will be admitted for an NSTEMI.           ED  Course as of 12/11/22 0002   Sat Dec 03, 2022   0620 Care transferred to Dr. Juan [HK]      ED Course User Index  [HK] Rosales Finch MD          Clinical Impression:   Final diagnoses:  [R07.9] Chest pain  [I21.4] NSTEMI (non-ST elevated myocardial infarction) (Primary)  [R77.8] Troponin I above reference range  [I24.9] ACS (acute coronary syndrome)        ED Disposition Condition    Observation                 Rosales Finch MD  12/11/22 0002       Nilda Juan MD  01/05/23 0614

## 2022-12-03 NOTE — ED NOTES
Bed: Exam 01  Expected date: 12/2/22  Expected time: 11:32 PM  Means of arrival: Personal Transportation  Comments:

## 2022-12-03 NOTE — ASSESSMENT & PLAN NOTE
GREGORY Score-82 points: 2% probability of death from admission to 6 months; HEART Score of 7 points-Risk of MACE 50-65%  Cardio consult placed; recs appreciated   Heparin infusion, Beta Blocker, statin, ASA    ECHO  Telemetry   Npo for now; awaiting cardio recs   PT/OT evaluation and tx     Patient Left AMA

## 2022-12-03 NOTE — LETTER
Patient: Johana Higgins  YOB: 1970  Date: 12/3/2022 Time: 2:39 PM  Location: [facility]  Emergency Department    Leaving the Hospital Against Medical Advice    Chart #:[id number]    This will certify that I, the undersigned,    ______________________________________________________________________    A patient in the above named medical center, having requested discharge and removal from the medical [place] against the advice of my attending physician(s), hereby release [facility], its physicians, officers and employees, severally and individually, from any and all liability of any nature whatsoever for any injury or harm or complication of any kind that may result directly or indirectly, by reason of my terminating my stay as a patient at [facility] Emergency [facility] and my departure from [facility], and hereby waive any and all rights of action I may now have or later acquire as a result of my voluntary departure from [facility] and the termination of my stay as a patient therein.    This release is made with the full knowledge of the danger that may result from the action which I am taking.      Date:_______________________                         ___________________________                                                                                    Patient/Legal Representative    Witness:        ____________________________                          ___________________________  Nurse                                                                        Physician

## 2022-12-03 NOTE — HOSPITAL COURSE
" Pt is a 52 y.o. female presenting with 2 nights of chest pressure. She describes an " aggravating" sensation radiaiting from her L ear into her L armpit and across her chest.Patient reports that over the last week she has been taking more Gax X pills thinking she had gastric reflux and if she belched she would feel better. Patient reports the sensation to want to belch never subsided and she started to feel more pressure in her chest keeping her up at night. She reports some associated nausea, diaphoresis, dyspnea, orthopnea but denies PND defainting spells 2 years ago She denies HA, blurry vision, dysphagia, or diarrhea.     Pt has no known medical problems and takes no daily medication. Pt is has been a 1 ppd smoker since the age of 12. She denies EtOH consumption. She occasionally smokes marijuana. 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 Patient is very independent and use to carrying out all MRADLs with no assistance.     Workup thus far has been significant for the following: Initial Vitals T. 97.9, /68, P 70, RR 18 O2 96%. Initial troponin was 241.2; subsequent troponins were 487.8 followed by 2,747.9. EKG reveals sinus rhythm with a rate of 66.  CXR reveals no acute cardiopulmonary process. CBC and CMP are grossly nl.     Patient was becoming agitated and wanted to eat. Patient was ordered a diet. Patient them became more agitated and complained about the ED bed patient was informed we were waiting on a bed open up. Patient then started complaining about wanting to go outside and smoke. Patient eventually decided to leave she was instructed that if she leaves she could die patient understood her risk and signed Against Medical Advice form. Patient Left AMA.  "

## 2022-12-03 NOTE — SUBJECTIVE & OBJECTIVE
History reviewed. No pertinent past medical history.    History reviewed. No pertinent surgical history.    Review of patient's allergies indicates:   Allergen Reactions    Penicillin Hives       Current Facility-Administered Medications on File Prior to Encounter   Medication    [COMPLETED] 0.9%  NaCl infusion    [COMPLETED] aspirin tablet 325 mg    [COMPLETED] sodium chloride 0.9% bolus 500 mL    [DISCONTINUED] 0.9%  NaCl infusion     No current outpatient medications on file prior to encounter.     Family History       Problem Relation (Age of Onset)    Heart attack Father, Brother, Maternal Grandfather    Heart disease Paternal Grandfather          Tobacco Use    Smoking status: Every Day     Types: Cigarettes    Smokeless tobacco: Not on file   Substance and Sexual Activity    Alcohol use: Never    Drug use: Yes     Types: Marijuana    Sexual activity: Yes     Review of Systems   Constitutional:  Negative for fatigue and fever.   HENT:  Negative for congestion, facial swelling, hearing loss and mouth sores.    Eyes:  Negative for pain, discharge and itching.   Respiratory:  Positive for shortness of breath. Negative for cough.    Cardiovascular:  Positive for chest pain. Negative for leg swelling.   Gastrointestinal:  Negative for abdominal distention, abdominal pain, anal bleeding, constipation and diarrhea.   Endocrine: Negative for cold intolerance, heat intolerance and polydipsia.   Genitourinary:  Negative for difficulty urinating, dyspareunia, enuresis and flank pain.   Musculoskeletal: Negative.    Skin: Negative.    Allergic/Immunologic: Negative.    Neurological:  Negative for light-headedness and headaches.   Hematological: Negative.    Psychiatric/Behavioral:  Negative for agitation, behavioral problems and confusion.    Objective:     Vital Signs (Most Recent):  Temp: 97.9 °F (36.6 °C) (12/03/22 0339)  Pulse: 68 (12/03/22 0619)  Resp: 17 (12/03/22 0619)  BP: 116/71 (12/03/22 0619)  SpO2: 98 %  (12/03/22 0619) Vital Signs (24h Range):  Temp:  [97.8 °F (36.6 °C)-97.9 °F (36.6 °C)] 97.9 °F (36.6 °C)  Pulse:  [56-89] 68  Resp:  [15-18] 17  SpO2:  [95 %-98 %] 98 %  BP: ()/(50-98) 116/71     Weight: 63.5 kg (140 lb)  Body mass index is 24.8 kg/m².    Physical Exam  Constitutional:       Appearance: She is normal weight.   HENT:      Head: Normocephalic.      Right Ear: Tympanic membrane normal.      Left Ear: Tympanic membrane normal.      Nose: Nose normal.      Mouth/Throat:      Mouth: Mucous membranes are moist.      Pharynx: Oropharynx is clear.   Eyes:      Conjunctiva/sclera: Conjunctivae normal.      Pupils: Pupils are equal, round, and reactive to light.   Cardiovascular:      Rate and Rhythm: Normal rate and regular rhythm.      Pulses: Normal pulses.      Heart sounds: Normal heart sounds.   Pulmonary:      Effort: Pulmonary effort is normal.      Breath sounds: Normal breath sounds.   Abdominal:      General: Abdomen is flat. Bowel sounds are normal.   Musculoskeletal:         General: Normal range of motion.      Cervical back: Normal range of motion.   Skin:     General: Skin is warm and dry.      Capillary Refill: Capillary refill takes less than 2 seconds.   Neurological:      Mental Status: She is alert and oriented to person, place, and time.   Psychiatric:         Mood and Affect: Mood normal.         CRANIAL NERVES     CN III, IV, VI   Pupils are equal, round, and reactive to light.     Significant Labs: All pertinent labs within the past 24 hours have been reviewed.  BMP:   Recent Labs   Lab 12/03/22  0003   *      K 3.5      CO2 29   BUN 16   CREATININE 0.93   CALCIUM 8.6   MG 1.8     CBC:   Recent Labs   Lab 12/02/22  2351 12/03/22  0359   WBC 10.28 8.26   HGB 14.4 13.4   HCT 41.6 42.3    329       Significant Imaging: I have reviewed all pertinent imaging results/findings within the past 24 hours.

## 2022-12-03 NOTE — DISCHARGE SUMMARY
"[facility] - Emergency Department  Hospital Medicine  Discharge Summary      Patient Name: Johana Higgins  MRN: [id number]  FLAQUITO: [id number]  Patient Class: OP- Observation  Admission Date: 12/3/2022  Hospital Length of Stay: 0 days  Discharge Date and Time:  12/03/2022 3:04 PM  Attending Physician: Missy att. providers found   Discharging Provider: Jesús Currie MD  Primary Care Provider: Primary Doctor Missy    Primary Care Team: Networked reference to record PCT     HPI:   Pt is a 52 y.o. female presenting with 2 nights of chest pressure. She describes an " aggravating" sensation radiaiting from her L ear into her L armpit and across her chest.Patient reports that over the last week she has been taking more Gax X pills thinking she had gastric reflux and if she belched she would feel better. Patient reports the sensation to want to belch never subsided and she started to feel more pressure in her chest keeping her up at night. She reports some associated nausea, diaphoresis, dyspnea, orthopnea but denies PND defainting spells 2 years ago She denies HA, blurry vision, dysphagia, or diarrhea.    Pt has no known medical problems and takes no daily medication. Pt is has been a 1 ppd smoker since the age of 12. She denies EtOH consumption. She occasionally smokes marijuana. 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 Patient is very independent and use to carrying out all MRADLs with no assistance.    Workup thus far has been significant for the following: Initial Vitals T. 97.9, /68, P 70, RR 18 O2 96%. Initial troponin was 241.2; subsequent troponins were 487.8 followed by 2,747.9. EKG reveals sinus rhythm with a rate of 66.  CXR reveals no acute cardiopulmonary process. CBC and CMP are grossly nl.     Pt is being admitted to the hospital medicine service under attending physician Dr. Cordero for the evaluation and management of NSTE-ACS.       * No surgery found *  " "    Hospital Course:    Pt is a 52 y.o. female presenting with 2 nights of chest pressure. She describes an " aggravating" sensation radiaiting from her L ear into her L armpit and across her chest.Patient reports that over the last week she has been taking more Gax X pills thinking she had gastric reflux and if she belched she would feel better. Patient reports the sensation to want to belch never subsided and she started to feel more pressure in her chest keeping her up at night. She reports some associated nausea, diaphoresis, dyspnea, orthopnea but denies PND defainting spells 2 years ago She denies HA, blurry vision, dysphagia, or diarrhea.     Pt has no known medical problems and takes no daily medication. Pt is has been a 1 ppd smoker since the age of 12. She denies EtOH consumption. She occasionally smokes marijuana. 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 Patient is very independent and use to carrying out all MRADLs with no assistance.     Workup thus far has been significant for the following: Initial Vitals T. 97.9, /68, P 70, RR 18 O2 96%. Initial troponin was 241.2; subsequent troponins were 487.8 followed by 2,747.9. EKG reveals sinus rhythm with a rate of 66.  CXR reveals no acute cardiopulmonary process. CBC and CMP are grossly nl.     Patient was becoming agitated and wanted to eat. Patient was ordered a diet. Patient them became more agitated and complained about the ED bed patient was informed we were waiting on a bed open up. Patient then started complaining about wanting to go outside and smoke. Patient eventually decided to leave she was instructed that if she leaves she could die patient understood her risk and signed Against Medical Advice form. Patient Left AMA.       Goals of Care Treatment Preferences:  Code Status: Full Code      Consults:   Consults (From admission, onward)        Status Ordering Provider     Inpatient consult to Cardiology  Once     "    Provider:  (Not yet assigned)    Ordered TU CHRISTIANSON JR          * NSTE-ACS  GREGORY Score-82 points: 2% probability of death from admission to 6 months; HEART Score of 7 points-Risk of MACE 50-65%  Cardio consult placed; recs appreciated   Heparin infusion, Beta Blocker, statin, ASA    ECHO  Telemetry   Npo for now; awaiting cardio recs   PT/OT evaluation and tx     Patient Left AMA          Final Active Diagnoses:    Diagnosis Date Noted POA    PRINCIPAL PROBLEM:  NSTE-ACS [I24.9] 12/03/2022 Unknown    Tobacco dependence [F17.200] 12/03/2022 Unknown      Problems Resolved During this Admission:       Discharged Condition: against medical advice    Disposition: Left Against Medical Adv*    Follow Up:    Patient Instructions:   No discharge procedures on file.    Significant Diagnostic Studies: Labs:   BMP:   Recent Labs   Lab 12/03/22  0003   *      K 3.5      CO2 29   BUN 16   CREATININE 0.93   CALCIUM 8.6   MG 1.8    and CBC   Recent Labs   Lab 12/02/22  2351 12/03/22  0359   WBC 10.28 8.26   HGB 14.4 13.4   HCT 41.6 42.3    329       Pending Diagnostic Studies:     Procedure Component Value Units Date/Time    EKG 12-lead [[id number]]     Order Status: Sent Lab Status: No result     EKG 12-lead [[id number]] Collected: 12/03/22 0334    Order Status: Sent Lab Status: In process Updated: 12/03/22 0429    Narrative:      Test Reason : R07.9,    Vent. Rate : 066 BPM     Atrial Rate : 000 BPM     P-R Int : 126 ms          QRS Dur : 076 ms      QT Int : 418 ms       P-R-T Axes : 079 073 083 degrees     QTc Int : 429 ms    Sinus rhythm  Poor R wave progression  Low QRS voltages in precordial leads  Borderline ECG      Referred By: SERGO MONTERO           Confirmed By:     EKG 12-lead [[id number]] Collected: 12/03/22 0336    Order Status: Sent Lab Status: In process Updated: 12/03/22 0427    Narrative:      Test Reason : R07.9,    Vent. Rate : 072 BPM     Atrial Rate : 000 BPM     P-R Int :  126 ms          QRS Dur : 074 ms      QT Int : 388 ms       P-R-T Axes : 079 076 085 degrees     QTc Int : 409 ms    Sinus rhythm  Possible sequence error: V1,V2 omitted  Anterolateral T wave abnormality  is nonspecific  Borderline ECG      Referred By: SERGO MONTERO           Confirmed By:     Echo [[id number]] Resulted: 12/03/22 1009    Order Status: Sent Lab Status: In process Updated: 12/03/22 1010     BSA 1.68 m2      EF 55 %      AORTIC VALVE CUSP SEPERATION 1.84 cm      LVIDd 4.12 cm      IVS 0.82 cm      Posterior Wall 0.77 cm      LVIDs 3.41 cm      FS 17 %      LV mass 97.31 g      LA size 2.80 cm      RVDD 2.45 cm      RA area 6.6 cm2      Left Ventricle Relative Wall Thickness 0.37 cm      E wave deceleration time 225.00 msec      MV Peak E Bhavin 0.77 m/s      TR Max Bhavin 3.16 m/s      LV Systolic Volume 47.77 mL      LV Systolic Volume Index 28.8 mL/m2      LV Diastolic Volume 75.08 mL      LV Diastolic Volume Index 45.23 mL/m2      LV Mass Index 59 g/m2      Triscuspid Valve Regurgitation Peak Gradient 40 mmHg     Narrative:      · The left ventricle is normal in size with normal systolic function.  · The estimated ejection fraction is 55%.  · Normal left ventricular diastolic function.  · Normal right ventricular size.  · Moderate mitral regurgitation.  · Moderate tricuspid regurgitation.       Impression:               Medications:  Reconciled Home Medications:      Medication List      You have not been prescribed any medications.         Indwelling Lines/Drains at time of discharge:   Lines/Drains/Airways     None                 Time spent on the discharge of patient: 35   minutes         Jesús Currie MD  Department of Hospital Medicine  [facility] Emergency Department

## 2022-12-03 NOTE — HPI
"Pt is a 52 y.o. female presenting with 2 nights of chest pressure. She describes an " aggravating" sensation radiaiting from her L ear into her L armpit and across her chest.Patient reports that over the last week she has been taking more Gax X pills thinking she had gastric reflux and if she belched she would feel better. Patient reports the sensation to want to belch never subsided and she started to feel more pressure in her chest keeping her up at night. She reports some associated nausea, diaphoresis, dyspnea, orthopnea but denies PND defainting spells 2 years ago She denies HA, blurry vision, dysphagia, or diarrhea.    Pt has no known medical problems and takes no daily medication. Pt is has been a 1 ppd smoker since the age of 12. She denies EtOH consumption. She occasionally smokes marijuana. 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 Patient is very independent and use to carrying out all MRADLs with no assistance.    Workup thus far has been significant for the following: Initial Vitals T. 97.9, /68, P 70, RR 18 O2 96%. Initial troponin was 241.2; subsequent troponins were 487.8 followed by 2,747.9. EKG reveals sinus rhythm with a rate of 66.  CXR reveals no acute cardiopulmonary process. CBC and CMP are grossly nl.     Pt is being admitted to the hospital medicine service under attending physician Dr. Cordero for the evaluation and management of NSTE-ACS.   "

## 2022-12-03 NOTE — ASSESSMENT & PLAN NOTE
GREGORY Score-82 points: 2% probability of death from admission to 6 months; HEART Score of 7 points-Risk of MACE 50-65%  Cardio consult placed; recs appreciated   Heparin infusion, Beta Blocker, statin, ASA    ECHO  Telemetry   Npo for now; awaiting cardio recs   PT/OT evaluation and tx

## 2022-12-03 NOTE — LETTER
Patient: Johana Higgins  YOB: 1970  Date: 12/3/2022 Time: 2:38 PM  Location: [facility]  Emergency Department    Leaving the Hospital Against Medical Advice    Chart #:[id number]    This will certify that I, the undersigned,    ______________________________________________________________________    A patient in the above named medical center, having requested discharge and removal from the medical [place] against the advice of my attending physician(s), hereby release [facility], its physicians, officers and employees, severally and individually, from any and all liability of any nature whatsoever for any injury or harm or complication of any kind that may result directly or indirectly, by reason of my terminating my stay as a patient at [facility] and my departure from [facility], and hereby waive any and all rights of action I may now have or later acquire as a result of my voluntary departure from [facility] and the termination of my stay as a patient therein.    This release is made with the full knowledge of the danger that may result from the action which I am taking.      Date:_______________________                         ___________________________                                                                                    Patient/Legal Representative    Witness:        ____________________________                          ___________________________  Nurse                                                                        Physician

## 2022-12-05 ENCOUNTER — HOSPITAL ENCOUNTER (OUTPATIENT)
Facility: HOSPITAL | Age: 52
Discharge: HOME | End: 2022-12-07
Attending: FAMILY MEDICINE | Admitting: FAMILY MEDICINE
Payer: COMMERCIAL

## 2022-12-05 DIAGNOSIS — I24.9 ACS (ACUTE CORONARY SYNDROME): Primary | ICD-10-CM

## 2022-12-05 DIAGNOSIS — R07.9 CHEST PAIN: ICD-10-CM

## 2022-12-05 DIAGNOSIS — I24.9 ACS (ACUTE CORONARY SYNDROME): ICD-10-CM

## 2022-12-05 PROBLEM — E78.5 HLD (HYPERLIPIDEMIA): Chronic | Status: ACTIVE | Noted: 2022-12-05

## 2022-12-05 PROBLEM — E78.5 HLD (HYPERLIPIDEMIA): Status: ACTIVE | Noted: 2022-12-05

## 2022-12-05 PROBLEM — F17.200 TOBACCO DEPENDENCE: Chronic | Status: ACTIVE | Noted: 2022-12-03

## 2022-12-05 LAB
ALBUMIN SERPL BCP-MCNC: 3.4 G/DL (ref 3.5–5)
ALBUMIN/GLOB SERPL: 0.9 {RATIO}
ALP SERPL-CCNC: 80 U/L (ref 41–108)
ALT SERPL W P-5'-P-CCNC: 28 U/L (ref 13–56)
ANION GAP SERPL CALCULATED.3IONS-SCNC: 14 MMOL/L (ref 7–16)
APTT PPP: 24.4 SECONDS (ref 25.2–37.3)
APTT PPP: 25.1 SECONDS (ref 25.2–37.3)
APTT PPP: 25.4 SECONDS (ref 25.2–37.3)
AST SERPL W P-5'-P-CCNC: 39 U/L (ref 15–37)
BASOPHILS # BLD AUTO: 0.06 K/UL (ref 0–0.2)
BASOPHILS # BLD AUTO: 0.1 K/UL (ref 0–0.2)
BASOPHILS NFR BLD AUTO: 0.6 % (ref 0–1)
BASOPHILS NFR BLD AUTO: 1.2 % (ref 0–1)
BILIRUB SERPL-MCNC: 0.3 MG/DL (ref ?–1.2)
BUN SERPL-MCNC: 14 MG/DL (ref 7–18)
BUN/CREAT SERPL: 18 (ref 6–20)
CALCIUM SERPL-MCNC: 9.1 MG/DL (ref 8.5–10.1)
CHLORIDE SERPL-SCNC: 104 MMOL/L (ref 98–107)
CO2 SERPL-SCNC: 24 MMOL/L (ref 21–32)
CREAT SERPL-MCNC: 0.8 MG/DL (ref 0.55–1.02)
DIFFERENTIAL METHOD BLD: ABNORMAL
DIFFERENTIAL METHOD BLD: ABNORMAL
EGFR (NO RACE VARIABLE) (RUSH/TITUS): 88 ML/MIN/1.73M²
EOSINOPHIL # BLD AUTO: 0.16 K/UL (ref 0–0.5)
EOSINOPHIL # BLD AUTO: 0.18 K/UL (ref 0–0.5)
EOSINOPHIL NFR BLD AUTO: 1.9 % (ref 1–4)
EOSINOPHIL NFR BLD AUTO: 1.9 % (ref 1–4)
ERYTHROCYTE [DISTWIDTH] IN BLOOD BY AUTOMATED COUNT: 12.8 % (ref 11.5–14.5)
ERYTHROCYTE [DISTWIDTH] IN BLOOD BY AUTOMATED COUNT: 13 % (ref 11.5–14.5)
GLOBULIN SER-MCNC: 3.7 G/DL (ref 2–4)
GLUCOSE SERPL-MCNC: 102 MG/DL (ref 74–106)
HCT VFR BLD AUTO: 42.3 % (ref 38–47)
HCT VFR BLD AUTO: 42.9 % (ref 38–47)
HGB BLD-MCNC: 13.7 G/DL (ref 12–16)
HGB BLD-MCNC: 13.9 G/DL (ref 12–16)
IMM GRANULOCYTES # BLD AUTO: 0.02 K/UL (ref 0–0.04)
IMM GRANULOCYTES # BLD AUTO: 0.03 K/UL (ref 0–0.04)
IMM GRANULOCYTES NFR BLD: 0.2 % (ref 0–0.4)
IMM GRANULOCYTES NFR BLD: 0.3 % (ref 0–0.4)
INR BLD: 1.01
INR BLD: 1.02
LYMPHOCYTES # BLD AUTO: 2.21 K/UL (ref 1–4.8)
LYMPHOCYTES # BLD AUTO: 2.43 K/UL (ref 1–4.8)
LYMPHOCYTES NFR BLD AUTO: 26.2 % (ref 27–41)
LYMPHOCYTES NFR BLD AUTO: 26.3 % (ref 27–41)
MCH RBC QN AUTO: 28.5 PG (ref 27–31)
MCH RBC QN AUTO: 28.5 PG (ref 27–31)
MCHC RBC AUTO-ENTMCNC: 32.4 G/DL (ref 32–36)
MCHC RBC AUTO-ENTMCNC: 32.4 G/DL (ref 32–36)
MCV RBC AUTO: 87.9 FL (ref 80–96)
MCV RBC AUTO: 88.1 FL (ref 80–96)
MONOCYTES # BLD AUTO: 0.49 K/UL (ref 0–0.8)
MONOCYTES # BLD AUTO: 0.51 K/UL (ref 0–0.8)
MONOCYTES NFR BLD AUTO: 5.5 % (ref 2–6)
MONOCYTES NFR BLD AUTO: 5.8 % (ref 2–6)
MPC BLD CALC-MCNC: 9.1 FL (ref 9.4–12.4)
MPC BLD CALC-MCNC: 9.6 FL (ref 9.4–12.4)
NEUTROPHILS # BLD AUTO: 5.44 K/UL (ref 1.8–7.7)
NEUTROPHILS # BLD AUTO: 6.04 K/UL (ref 1.8–7.7)
NEUTROPHILS NFR BLD AUTO: 64.7 % (ref 53–65)
NEUTROPHILS NFR BLD AUTO: 65.4 % (ref 53–65)
NRBC # BLD AUTO: 0 X10E3/UL
NRBC # BLD AUTO: 0 X10E3/UL
NRBC, AUTO (.00): 0 %
NRBC, AUTO (.00): 0 %
NT-PROBNP SERPL-MCNC: 3306 PG/ML (ref 1–125)
PLATELET # BLD AUTO: 310 K/UL (ref 150–400)
PLATELET # BLD AUTO: 334 K/UL (ref 150–400)
POTASSIUM SERPL-SCNC: 4.3 MMOL/L (ref 3.5–5.1)
PROT SERPL-MCNC: 7.1 G/DL (ref 6.4–8.2)
PROTHROMBIN TIME: 12.9 SECONDS (ref 11.7–14.7)
PROTHROMBIN TIME: 13 SECONDS (ref 11.7–14.7)
RBC # BLD AUTO: 4.8 M/UL (ref 4.2–5.4)
RBC # BLD AUTO: 4.88 M/UL (ref 4.2–5.4)
SODIUM SERPL-SCNC: 138 MMOL/L (ref 136–145)
TROPONIN I SERPL HS-MCNC: 3261.6 PG/ML
TROPONIN I SERPL HS-MCNC: 3423.1 PG/ML
WBC # BLD AUTO: 8.42 K/UL (ref 4.5–11)
WBC # BLD AUTO: 9.25 K/UL (ref 4.5–11)

## 2022-12-05 PROCEDURE — 96366 THER/PROPH/DIAG IV INF ADDON: CPT

## 2022-12-05 PROCEDURE — 93005 ELECTROCARDIOGRAM TRACING: CPT

## 2022-12-05 PROCEDURE — 85730 THROMBOPLASTIN TIME PARTIAL: CPT | Mod: 91 | Performed by: FAMILY MEDICINE

## 2022-12-05 PROCEDURE — 84484 ASSAY OF TROPONIN QUANT: CPT | Mod: 91 | Performed by: REGISTERED NURSE

## 2022-12-05 PROCEDURE — 84484 ASSAY OF TROPONIN QUANT: CPT | Performed by: FAMILY MEDICINE

## 2022-12-05 PROCEDURE — G0378 HOSPITAL OBSERVATION PER HR: HCPCS

## 2022-12-05 PROCEDURE — 96376 TX/PRO/DX INJ SAME DRUG ADON: CPT | Mod: 59

## 2022-12-05 PROCEDURE — 99245 OFF/OP CONSLTJ NEW/EST HI 55: CPT | Mod: ,,, | Performed by: STUDENT IN AN ORGANIZED HEALTH CARE EDUCATION/TRAINING PROGRAM

## 2022-12-05 PROCEDURE — 96365 THER/PROPH/DIAG IV INF INIT: CPT

## 2022-12-05 PROCEDURE — 63600175 PHARM REV CODE 636 W HCPCS: Performed by: REGISTERED NURSE

## 2022-12-05 PROCEDURE — 25000003 PHARM REV CODE 250: Performed by: FAMILY MEDICINE

## 2022-12-05 PROCEDURE — 96376 TX/PRO/DX INJ SAME DRUG ADON: CPT

## 2022-12-05 PROCEDURE — 83880 ASSAY OF NATRIURETIC PEPTIDE: CPT | Performed by: FAMILY MEDICINE

## 2022-12-05 PROCEDURE — 85610 PROTHROMBIN TIME: CPT | Performed by: REGISTERED NURSE

## 2022-12-05 PROCEDURE — 93010 EKG 12-LEAD: ICD-10-PCS | Mod: ,,, | Performed by: STUDENT IN AN ORGANIZED HEALTH CARE EDUCATION/TRAINING PROGRAM

## 2022-12-05 PROCEDURE — 85610 PROTHROMBIN TIME: CPT | Mod: 91 | Performed by: FAMILY MEDICINE

## 2022-12-05 PROCEDURE — 99234 PR OBSERV/HOSP SAME DATE,LEVL III: ICD-10-PCS | Mod: GC,,, | Performed by: FAMILY MEDICINE

## 2022-12-05 PROCEDURE — 85025 COMPLETE CBC W/AUTO DIFF WBC: CPT | Performed by: REGISTERED NURSE

## 2022-12-05 PROCEDURE — 36415 COLL VENOUS BLD VENIPUNCTURE: CPT | Performed by: FAMILY MEDICINE

## 2022-12-05 PROCEDURE — 80053 COMPREHEN METABOLIC PANEL: CPT | Performed by: FAMILY MEDICINE

## 2022-12-05 PROCEDURE — 85025 COMPLETE CBC W/AUTO DIFF WBC: CPT | Performed by: FAMILY MEDICINE

## 2022-12-05 PROCEDURE — 25000003 PHARM REV CODE 250: Performed by: REGISTERED NURSE

## 2022-12-05 PROCEDURE — 99285 PR EMERGENCY DEPT VISIT,LEVEL V: ICD-10-PCS | Mod: ,,, | Performed by: FAMILY MEDICINE

## 2022-12-05 PROCEDURE — 94761 N-INVAS EAR/PLS OXIMETRY MLT: CPT | Mod: 59

## 2022-12-05 PROCEDURE — 93010 ELECTROCARDIOGRAM REPORT: CPT | Mod: ,,, | Performed by: STUDENT IN AN ORGANIZED HEALTH CARE EDUCATION/TRAINING PROGRAM

## 2022-12-05 PROCEDURE — 99285 EMERGENCY DEPT VISIT HI MDM: CPT | Mod: 25

## 2022-12-05 PROCEDURE — 99245 PR OFFICE CONSULTATION,LEVEL V: ICD-10-PCS | Mod: ,,, | Performed by: STUDENT IN AN ORGANIZED HEALTH CARE EDUCATION/TRAINING PROGRAM

## 2022-12-05 PROCEDURE — 99234 HOSP IP/OBS SM DT SF/LOW 45: CPT | Mod: GC,,, | Performed by: FAMILY MEDICINE

## 2022-12-05 PROCEDURE — 99285 EMERGENCY DEPT VISIT HI MDM: CPT | Mod: ,,, | Performed by: FAMILY MEDICINE

## 2022-12-05 PROCEDURE — 36415 COLL VENOUS BLD VENIPUNCTURE: CPT | Performed by: REGISTERED NURSE

## 2022-12-05 PROCEDURE — 85730 THROMBOPLASTIN TIME PARTIAL: CPT | Performed by: REGISTERED NURSE

## 2022-12-05 RX ORDER — SODIUM CHLORIDE 0.9 % (FLUSH) 0.9 %
2 SYRINGE (ML) INJECTION EVERY 12 HOURS PRN
Status: DISCONTINUED | OUTPATIENT
Start: 2022-12-05 | End: 2022-12-07 | Stop reason: HOSPADM

## 2022-12-05 RX ORDER — ACETAMINOPHEN 325 MG/1
650 TABLET ORAL EVERY 4 HOURS PRN
Status: DISCONTINUED | OUTPATIENT
Start: 2022-12-05 | End: 2022-12-06

## 2022-12-05 RX ORDER — HEPARIN SODIUM,PORCINE/D5W 25000/250
0-40 INTRAVENOUS SOLUTION INTRAVENOUS CONTINUOUS
Status: DISCONTINUED | OUTPATIENT
Start: 2022-12-05 | End: 2022-12-06

## 2022-12-05 RX ORDER — ONDANSETRON 2 MG/ML
4 INJECTION INTRAMUSCULAR; INTRAVENOUS EVERY 8 HOURS PRN
Status: DISCONTINUED | OUTPATIENT
Start: 2022-12-05 | End: 2022-12-06

## 2022-12-05 RX ORDER — IBUPROFEN 200 MG
16 TABLET ORAL
Status: DISCONTINUED | OUTPATIENT
Start: 2022-12-05 | End: 2022-12-07 | Stop reason: HOSPADM

## 2022-12-05 RX ORDER — NALOXONE HCL 0.4 MG/ML
0.02 VIAL (ML) INJECTION
Status: DISCONTINUED | OUTPATIENT
Start: 2022-12-05 | End: 2022-12-07 | Stop reason: HOSPADM

## 2022-12-05 RX ORDER — GLUCAGON 1 MG
1 KIT INJECTION
Status: DISCONTINUED | OUTPATIENT
Start: 2022-12-05 | End: 2022-12-07 | Stop reason: HOSPADM

## 2022-12-05 RX ORDER — ATORVASTATIN CALCIUM 80 MG/1
80 TABLET, FILM COATED ORAL NIGHTLY
Status: DISCONTINUED | OUTPATIENT
Start: 2022-12-05 | End: 2022-12-07 | Stop reason: HOSPADM

## 2022-12-05 RX ORDER — TALC
6 POWDER (GRAM) TOPICAL NIGHTLY PRN
Status: DISCONTINUED | OUTPATIENT
Start: 2022-12-05 | End: 2022-12-07 | Stop reason: HOSPADM

## 2022-12-05 RX ORDER — IBUPROFEN 200 MG
1 TABLET ORAL DAILY
Status: DISCONTINUED | OUTPATIENT
Start: 2022-12-05 | End: 2022-12-07 | Stop reason: HOSPADM

## 2022-12-05 RX ORDER — IBUPROFEN 200 MG
24 TABLET ORAL
Status: DISCONTINUED | OUTPATIENT
Start: 2022-12-05 | End: 2022-12-07 | Stop reason: HOSPADM

## 2022-12-05 RX ORDER — ASPIRIN 325 MG
325 TABLET ORAL
Status: COMPLETED | OUTPATIENT
Start: 2022-12-05 | End: 2022-12-05

## 2022-12-05 RX ORDER — POLYETHYLENE GLYCOL 3350 17 G/17G
17 POWDER, FOR SOLUTION ORAL DAILY PRN
Status: DISCONTINUED | OUTPATIENT
Start: 2022-12-05 | End: 2022-12-07 | Stop reason: HOSPADM

## 2022-12-05 RX ORDER — ASPIRIN 81 MG/1
81 TABLET ORAL DAILY
Status: DISCONTINUED | OUTPATIENT
Start: 2022-12-06 | End: 2022-12-07 | Stop reason: HOSPADM

## 2022-12-05 RX ADMIN — METOPROLOL SUCCINATE 12.5 MG: 25 TABLET, EXTENDED RELEASE ORAL at 05:12

## 2022-12-05 RX ADMIN — NITROGLYCERIN 0.5 INCH: 20 OINTMENT TOPICAL at 05:12

## 2022-12-05 RX ADMIN — ACETAMINOPHEN 650 MG: 325 TABLET ORAL at 08:12

## 2022-12-05 RX ADMIN — NITROGLYCERIN 0.5 INCH: 20 OINTMENT TOPICAL at 11:12

## 2022-12-05 RX ADMIN — ASPIRIN 325 MG ORAL TABLET 325 MG: 325 PILL ORAL at 03:12

## 2022-12-05 RX ADMIN — Medication 6 MG: at 08:12

## 2022-12-05 RX ADMIN — ATORVASTATIN CALCIUM 80 MG: 80 TABLET, FILM COATED ORAL at 08:12

## 2022-12-05 RX ADMIN — HEPARIN SODIUM 12 UNITS/KG/HR: 10000 INJECTION, SOLUTION INTRAVENOUS at 04:12

## 2022-12-05 NOTE — Clinical Note
The catheter was inserted over the wire into the ostium   left main. An angiography was performed of the left coronary arteries.

## 2022-12-05 NOTE — CONSULTS
[facility] Emergency Department  Cardiology  Consult Note    Patient Name: Johana Higgins  MRN: [id number]  Admission Date: 12/5/2022  Hospital Length of Stay: 0 days  Code Status: Prior   Attending Provider: iSerra Pinzon MD   Consulting Provider: NIKHIL Heart  Primary Care Physician: Primary Doctor No  Principal Problem:<principal problem not specified>    Patient information was obtained from patient and ER records.     Inpatient consult to Cardiology  Consult performed by: NIKHIL Heart  Consult ordered by: Nilda Juan MD  Reason for consult: chest pain elevated troponin      Subjective:     Chief Complaint:  Chest Pain     HPI:   51 y/o female seen in consult today for chest pain with elevated troponin levels. The patient was seen at [facility] 12/3/22 with similar symptoms and elevated troponin; leaving AMA before [facility]. She reports chest started Thursday 12/1/22 while at rest, self resolving. Later that night after work her symptoms returned. Today she begin having left side pain under her arm that radiated to her chest and into left jaw. Pain is described as tightness/pressure type pain, rated 7/10 on pain scale at worst. Denies alleviating or aggravating factors. No reported palpitations, n/v, SOB, diaphoresis, abdominal pain, or recent illness. History of smoking for 40 years and Marijuana use.    ED workup: Troponin-->3000, Ekg - SR with non specific inferior lateral ST-T wave abnormalities      Past medical hx: Tobacco dependence, HLD      History reviewed. No pertinent past medical history.    History reviewed. No pertinent surgical history.    Review of patient's allergies indicates:   Allergen Reactions    Penicillin Hives       No current facility-administered medications on file prior to encounter.     No current outpatient medications on file prior to encounter.     Family History       Problem Relation (Age of Onset)    Heart attack Father, Brother,  Maternal Grandfather    Heart disease Paternal Grandfather          Tobacco Use    Smoking status: Every Day     Types: Cigarettes    Smokeless tobacco: Not on file   Substance and Sexual Activity    Alcohol use: Never    Drug use: Yes     Types: Marijuana    Sexual activity: Yes     Review of Systems   Constitutional: Negative for fever.   Cardiovascular:  Positive for chest pain. Negative for leg swelling, near-syncope and palpitations.   Gastrointestinal:  Negative for abdominal pain, nausea and vomiting.   All other systems reviewed and are negative.  Objective:     Vital Signs (Most Recent):  Temp: 98.1 °F (36.7 °C) (12/05/22 1409)  Pulse: 87 (12/05/22 1519)  Resp: (!) 22 (12/05/22 1419)  BP: 113/80 (12/05/22 1519)  SpO2: 99 % (12/05/22 1519)   Vital Signs (24h Range):  Temp:  [98.1 °F (36.7 °C)] 98.1 °F (36.7 °C)  Pulse:  [87-96] 87  Resp:  [18-22] 22  SpO2:  [96 %-99 %] 99 %  BP: (101-123)/(77-81) 113/80     Weight: 61.9 kg (136 lb 8 oz)  Body mass index is 24.18 kg/m².    SpO2: 99 %  O2 Device (Oxygen Therapy): room air    No intake or output data in the 24 hours ending 12/05/22 1612    Lines/Drains/Airways       Peripheral Intravenous Line  Duration                  Peripheral IV - Single Lumen 12/05/22 1436 20 G Posterior;Right Hand <1 day                    Physical Exam  Vitals reviewed.   Constitutional:       General: She is not in acute distress.     Appearance: Normal appearance.   HENT:      Head: Normocephalic and atraumatic.      Mouth/Throat:      Mouth: Mucous membranes are moist.   Eyes:      Extraocular Movements: Extraocular movements intact.      Pupils: Pupils are equal, round, and reactive to light.   Cardiovascular:      Rate and Rhythm: Normal rate and regular rhythm.      Pulses: Normal pulses.      Heart sounds: Normal heart sounds. No murmur heard.  Pulmonary:      Effort: Pulmonary effort is normal.      Breath sounds: Normal breath sounds.   Abdominal:      General: Bowel  sounds are normal. There is no distension.      Palpations: Abdomen is soft.   Musculoskeletal:         General: Normal range of motion.      Cervical back: Normal range of motion and neck supple.   Skin:     General: Skin is warm and dry.      Capillary Refill: Capillary refill takes less than 2 seconds.   Neurological:      General: No focal deficit present.      Mental Status: She is alert and oriented to person, place, and time.      Cranial Nerves: No cranial nerve deficit.      Sensory: No sensory deficit.   Psychiatric:         Mood and Affect: Mood normal.         Behavior: Behavior normal.       Significant Labs: All pertinent lab results from the last 24 hours have been reviewed.    Significant Imaging: EKG:      Assessment and Plan:     HLD (hyperlipidemia)  Lipitor 80 mg qhs  Lab Results   Component Value Date    CHOL 234 (H) 12/03/2022     Lab Results   Component Value Date    HDL 50 12/03/2022     Lab Results   Component Value Date    LDLCALC 131 12/03/2022     Lab Results   Component Value Date    TRIG 264 (H) 12/03/2022     Lab Results   Component Value Date    CHOLHDL 4.7 12/03/2022       Tobacco dependence  Smoking cessation discussed. Education provided   Nicotine patch 21 mg    NSTE-ACS  Patient seen and evaluated by    Alice score: ALICE score: 4 - 20% risk at 14 days of: all-cause mortality, new or recurrent MI, or severe recurrent ischemia requiring urgent revascularization.  Troponin --> 3306  Ekg: SR with non specific inferior lateral ST-T wave abnormalities    Lipid panel   Lopressor 12. 5 mg bid   Lipitor 80 mg qhs    mg in ED  Plan for LHC; Procedure, risk and benefits discussed in detail with patient and family, they verbalized understanding and wishes to proceed. Consent obtained and on chart.   Further recommendations to follow   - Echo 12/3/22  The left ventricle is normal in size with normal systolic function.  The estimated ejection fraction is 55%.  Normal left  ventricular diastolic function.  Normal right ventricular size.  Moderate mitral regurgitation.  Moderate tricuspid regurgitation.  There are segmental left ventricular wall motion abnormalities.          VTE Risk Mitigation (From admission, onward)           Ordered     heparin 25,000 units in dextrose 5% (100 units/ml) IV bolus from bag INITIAL BOLUS (max bolus 4000 units)  Once        Question:  Heparin Infusion Adjustment (DO NOT MODIFY ANSWER)  Answer:  \\[email or web address]\epic\Images\Pharmacy\HeparinInfusions\heparin LOW INTENSITY nomogram for [facility] [id number]    12/05/22 1616     heparin 25,000 units in dextrose 5% 250 mL (100 units/mL) infusion LOW INTENSITY nomogram - [facility]  Continuous        Question:  Begin at (in units/kg/hr)  Answer:  12 12/05/22 1616     heparin 25,000 units in dextrose 5% 250 mL (100 units/mL) infusion LOW INTENSITY nomogram - [facility]  Continuous        Question:  Begin at (in units/kg/hr)  Answer:  12    12/05/22 1555     heparin 25,000 units in dextrose 5% (100 units/ml) IV bolus from bag ADDITIONAL PRN BOLUS - 60 units/kg (max bolus 4000 units)  As needed (PRN)        Question:  Heparin Infusion Adjustment (DO NOT MODIFY ANSWER)  Answer:  \[email or web address]\epic\Images\Pharmacy\HeparinInfusions\heparin LOW INTENSITY nomogram for [facility] [id number].pdf    12/05/22 1555     heparin 25,000 units in dextrose 5% (100 units/ml) IV bolus from bag - ADDITIONAL PRN BOLUS - 30 units/kg (max bolus 4000 units)  As needed (PRN)        Question:  Heparin Infusion Adjustment (DO NOT MODIFY ANSWER)  Answer:  \[email or web address]\epic\Images\Pharmacy\HeparinInfusions\heparin LOW INTENSITY nomogram for [facility] [id number].pdf    12/05/22 1555     heparin 25,000 units in dextrose 5% (100 units/ml) IV bolus from bag ADDITIONAL PRN BOLUS - 60 units/kg (max bolus 4000 units)  As needed (PRN)        Question:  Heparin Infusion Adjustment (DO NOT MODIFY ANSWER)  Answer:  \[email or web address]\epic\Images\Pharmacy\HeparinInfusions\heparin LOW  INTENSITY nomogram for [facility] [id number].pdf    12/05/22 1612     heparin 25,000 units in dextrose 5% (100 units/ml) IV bolus from bag - ADDITIONAL PRN BOLUS - 30 units/kg (max bolus 4000 units)  As needed (PRN)        Question:  Heparin Infusion Adjustment (DO NOT MODIFY ANSWER)  Answer:  [email or web address]\epic\Images\Pharmacy\HeparinInfusions\heparin LOW INTENSITY nomogram for [facility] [id number].pdf    12/05/22 6035                    Thank you for your consult. I will follow-up with patient. Please contact us if you have any additional questions.    Narinder Garcia, STEPHIEP  Cardiology   [facility]

## 2022-12-05 NOTE — ASSESSMENT & PLAN NOTE
Patient seen and evaluated by    Alice score: ALICE score: 4 - 20% risk at 14 days of: all-cause mortality, new or recurrent MI, or severe recurrent ischemia requiring urgent revascularization.  Troponin --> 3306  Ekg: SR with non specific inferior lateral ST-T wave abnormalities    Lipid panel   Lopressor 12. 5 mg bid   Lipitor 80 mg qhs    mg in ED  Plan for LHC; Procedure, risk and benefits discussed in detail with patient and family, they verbalized understanding and wishes to proceed. Consent obtained and on chart.   Further recommendations to follow   - Echo 12/3/22   The left ventricle is normal in size with normal systolic function.   The estimated ejection fraction is 55%.   Normal left ventricular diastolic function.   Normal right ventricular size.   Moderate mitral regurgitation.   Moderate tricuspid regurgitation.   There are segmental left ventricular wall motion abnormalities.

## 2022-12-05 NOTE — Clinical Note
Diagnosis: Chest pain [375250]   Future Attending Provider: ADELA FRIEND [[id number]]   Admitting Provider:: ADELA FRIEND [[id number]]

## 2022-12-05 NOTE — Clinical Note
The catheter was repositioned into the left main. The catheter was unable to access the area. and The catheter was unable to engage the area..

## 2022-12-05 NOTE — Clinical Note
The radial band was applied to the right radial artery. 16 cc's of air were inserted into the closure device.

## 2022-12-05 NOTE — Clinical Note
The catheter was repositioned into the ostium   left main. An angiography was performed of the left coronary arteries.

## 2022-12-05 NOTE — ASSESSMENT & PLAN NOTE
Lipitor 80 mg qhs  Lab Results   Component Value Date    CHOL 234 (H) 12/03/2022     Lab Results   Component Value Date    HDL 50 12/03/2022     Lab Results   Component Value Date    LDLCALC 131 12/03/2022     Lab Results   Component Value Date    TRIG 264 (H) 12/03/2022     Lab Results   Component Value Date    CHOLHDL 4.7 12/03/2022

## 2022-12-05 NOTE — HPI
51 y/o female seen in consult today for chest pain with elevated troponin levels. The patient was seen at [facility] 12/3/22 with similar symptoms and elevated troponin; leaving AMA before [facility]. She reports chest started Thursday 12/1/22 while at rest, self resolving. Later that night after work her symptoms returned. Today she begin having left side pain under her arm that radiated to her chest and into left jaw. Pain is described as tightness/pressure type pain, rated 7/10 on pain scale at worst. Denies alleviating or aggravating factors. No reported palpitations, n/v, SOB, diaphoresis, abdominal pain, or recent illness. History of smoking for 40 years and Marijuana use.    ED workup: Troponin-->3000, Ekg - SR with non specific inferior lateral ST-T wave abnormalities      Past medical hx: Tobacco dependence, HLD

## 2022-12-05 NOTE — Clinical Note
The catheter was repositioned into the ostium   left main. The catheter was unable to access the area. and The catheter was unable to engage the area..

## 2022-12-05 NOTE — SUBJECTIVE & OBJECTIVE
History reviewed. No pertinent past medical history.    History reviewed. No pertinent surgical history.    Review of patient's allergies indicates:   Allergen Reactions    Penicillin Hives       No current facility-administered medications on file prior to encounter.     No current outpatient medications on file prior to encounter.     Family History       Problem Relation (Age of Onset)    Heart attack Father, Brother, Maternal Grandfather    Heart disease Paternal Grandfather          Tobacco Use    Smoking status: Every Day     Types: Cigarettes    Smokeless tobacco: Not on file   Substance and Sexual Activity    Alcohol use: Never    Drug use: Yes     Types: Marijuana    Sexual activity: Yes     Review of Systems   Constitutional: Negative for fever.   Cardiovascular:  Positive for chest pain. Negative for leg swelling, near-syncope and palpitations.   Gastrointestinal:  Negative for abdominal pain, nausea and vomiting.   All other systems reviewed and are negative.  Objective:     Vital Signs (Most Recent):  Temp: 98.1 °F (36.7 °C) (12/05/22 1409)  Pulse: 87 (12/05/22 1519)  Resp: (!) 22 (12/05/22 1419)  BP: 113/80 (12/05/22 1519)  SpO2: 99 % (12/05/22 1519)   Vital Signs (24h Range):  Temp:  [98.1 °F (36.7 °C)] 98.1 °F (36.7 °C)  Pulse:  [87-96] 87  Resp:  [18-22] 22  SpO2:  [96 %-99 %] 99 %  BP: (101-123)/(77-81) 113/80     Weight: 61.9 kg (136 lb 8 oz)  Body mass index is 24.18 kg/m².    SpO2: 99 %  O2 Device (Oxygen Therapy): room air    No intake or output data in the 24 hours ending 12/05/22 1612    Lines/Drains/Airways       Peripheral Intravenous Line  Duration                  Peripheral IV - Single Lumen 12/05/22 1436 20 G Posterior;Right Hand <1 day                    Physical Exam  Vitals reviewed.   Constitutional:       General: She is not in acute distress.     Appearance: Normal appearance.   HENT:      Head: Normocephalic and atraumatic.      Mouth/Throat:      Mouth: Mucous membranes are  moist.   Eyes:      Extraocular Movements: Extraocular movements intact.      Pupils: Pupils are equal, round, and reactive to light.   Cardiovascular:      Rate and Rhythm: Normal rate and regular rhythm.      Pulses: Normal pulses.      Heart sounds: Normal heart sounds. No murmur heard.  Pulmonary:      Effort: Pulmonary effort is normal.      Breath sounds: Normal breath sounds.   Abdominal:      General: Bowel sounds are normal. There is no distension.      Palpations: Abdomen is soft.   Musculoskeletal:         General: Normal range of motion.      Cervical back: Normal range of motion and neck supple.   Skin:     General: Skin is warm and dry.      Capillary Refill: Capillary refill takes less than 2 seconds.   Neurological:      General: No focal deficit present.      Mental Status: She is alert and oriented to person, place, and time.      Cranial Nerves: No cranial nerve deficit.      Sensory: No sensory deficit.   Psychiatric:         Mood and Affect: Mood normal.         Behavior: Behavior normal.       Significant Labs: All pertinent lab results from the last 24 hours have been reviewed.    Significant Imaging: EKG:

## 2022-12-05 NOTE — ED PROVIDER NOTES
Encounter Date: 12/5/2022    SCRIBE #1 NOTE: I, April Singer, am scribing for, and in the presence of,  Nilda Juan MD. I have scribed the entire note.     History     Chief Complaint   Patient presents with    Chest Pain     Patient is a 52 y.o. female who presents to the ED for chest pain. Patient had a heart attack three days ago and was admitted to the ED, but left AMA. Her chest pain has returned. Patient was instructed by Dr Cotter this morning to come to the ED. Patient has been taking aspirin for the past couple of days.    The history is provided by the patient. No  was used.   Review of patient's allergies indicates:   Allergen Reactions    Penicillin Hives     History reviewed. No pertinent past medical history.  Past Surgical History:   Procedure Laterality Date    ANGIOGRAM, CORONARY, WITH LEFT HEART CATHETERIZATION N/A 12/6/2022    Procedure: Angiogram, Coronary, with Left Heart Cath;  Surgeon: Dennys Barakat MD;  Location: [facility] CATH LAB;  Service: Cardiology;  Laterality: N/A;     Family History   Problem Relation Age of Onset    Heart attack Father     Heart attack Brother     Heart attack Maternal Grandfather     Heart disease Paternal Grandfather      Social History     Tobacco Use    Smoking status: Every Day     Types: Cigarettes   Substance Use Topics    Alcohol use: Never    Drug use: Yes     Types: Marijuana     Review of Systems   Constitutional: Negative.    HENT: Negative.  Negative for drooling.    Eyes: Negative.    Respiratory: Negative.     Cardiovascular:  Positive for chest pain.   Gastrointestinal: Negative.    Endocrine: Negative.    Genitourinary: Negative.    Musculoskeletal: Negative.    Skin: Negative.    Allergic/Immunologic: Negative.    Neurological: Negative.    Hematological: Negative.    Psychiatric/Behavioral: Negative.     All other systems reviewed and are negative.    Physical Exam     Initial Vitals [12/05/22 1409]   BP  Pulse Resp Temp SpO2   123/81 96 18 98.1 °F (36.7 °C) 98 %      MAP       --         Physical Exam    Vitals reviewed.  Constitutional: She appears well-developed and well-nourished.   HENT:   Head: Normocephalic and atraumatic.   Eyes: Conjunctivae and EOM are normal. Pupils are equal, round, and reactive to light.   Cardiovascular:  Normal rate, regular rhythm, normal heart sounds and intact distal pulses.           Pulmonary/Chest: Breath sounds normal.   Abdominal: Abdomen is soft. Bowel sounds are normal.     Neurological: She is alert and oriented to person, place, and time.   Psychiatric: She has a normal mood and affect. Her behavior is normal. Judgment and thought content normal.       ED Course   Procedures  Labs Reviewed   COMPREHENSIVE METABOLIC PANEL - Abnormal; Notable for the following components:       Result Value    Albumin 3.4 (*)     AST 39 (*)     All other components within normal limits   TROPONIN I - Abnormal; Notable for the following components:    Troponin I High Sensitivity 3,261.6 (*)     All other components within normal limits   NT-PRO NATRIURETIC PEPTIDE - Abnormal; Notable for the following components:    ProBNP 3,306 (*)     All other components within normal limits   CBC WITH DIFFERENTIAL - Abnormal; Notable for the following components:    Lymphocytes % 26.2 (*)     Basophils % 1.2 (*)     All other components within normal limits   APTT - Abnormal; Notable for the following components:    PTT 24.4 (*)     All other components within normal limits   CBC WITH DIFFERENTIAL - Abnormal; Notable for the following components:    MPV 9.1 (*)     Neutrophils % 65.4 (*)     Lymphocytes % 26.3 (*)     All other components within normal limits   PROTIME-INR - Normal   APTT - Normal   PROTIME-INR - Normal   CBC W/ AUTO DIFFERENTIAL    Narrative:     The following orders were created for panel order CBC auto differential.  Procedure                               Abnormality         Status                      ---------                               -----------         ------                     CBC with Differential[[id number]]        Abnormal            Final result                 Please view results for these tests on the individual orders.   CBC W/ AUTO DIFFERENTIAL    Narrative:     The following orders were created for panel order CBC auto differential.  Procedure                               Abnormality         Status                     ---------                               -----------         ------                     CBC with Differential[[id number]]        Abnormal            Final result                 Please view results for these tests on the individual orders.        ECG Results              EKG 12-lead (In process)  Result time 12/05/22 14:54:48      In process by Interface, Lab In [facility] (12/05/22 14:54:48)                   Narrative:    Test Reason : R07.9,    Vent. Rate : 090 BPM     Atrial Rate : 000 BPM     P-R Int : 118 ms          QRS Dur : 076 ms      QT Int : 340 ms       P-R-T Axes : 074 075 034 degrees     QTc Int : 393 ms    Sinus rhythm  Poor R wave progression  Inferior/lateral ST-T abnormality  is nonspecific  Low QRS voltages in precordial leads  Borderline ECG      Referred By: [email or web address]   SELF           Confirmed By:                                   Imaging Results              X-Ray Chest AP Portable (Final result)  Result time 12/05/22 14:54:59      Final result by Jesús Borges MD (12/05/22 14:54:59)                   Impression:      No acute cardiopulmonary process      Electronically signed by: Jesús Borges  Date:    12/05/2022  Time:    14:54               Narrative:    EXAMINATION:  XR CHEST AP PORTABLE    CLINICAL HISTORY:  Chest Pain;.    COMPARISON:  December 3, 2022    TECHNIQUE:  Chest x-ray AP portable erect    FINDINGS:  The cardiac silhouette is not enlarged.  There is no mediastinal mass.  There is no pulmonary vascular  engorgement.    Lungs and pleural spaces are generally clear.    Osseous structures are unchanged.  There is mild to moderate thoracic spondylosis                                       Medications   aspirin tablet 325 mg (325 mg Oral Given 12/5/22 1515)   heparin 25,000 units in dextrose 5% (100 units/ml) IV bolus from bag INITIAL BOLUS (max bolus 4000 units) (3,370 Units Intravenous Bolus from Bag 12/5/22 1628)     Medical Decision Making:   Other:   I have discussed this case with another health care provider.       <> Summary of the Discussion: 15:02 Dr. Juan spoke with Dr. Erazo about the patient. Patient will also be seen by Dr. Erazo.          Attending Attestation:           Physician Attestation for Scribe:  Physician Attestation Statement for Scribe #1: I, Nilda Juan MD, reviewed documentation, as scribed by April Singer in my presence, and it is both accurate and complete.                        Clinical Impression:   Final diagnoses:  [R07.9] Chest pain        ED Disposition Condition    Observation                 Nilda Juan MD  12/11/22 1170

## 2022-12-06 LAB
ANION GAP SERPL CALCULATED.3IONS-SCNC: 13 MMOL/L (ref 7–16)
APTT PPP: 46.8 SECONDS (ref 25.2–37.3)
BASOPHILS # BLD AUTO: 0.08 K/UL (ref 0–0.2)
BASOPHILS NFR BLD AUTO: 1 % (ref 0–1)
BUN SERPL-MCNC: 15 MG/DL (ref 7–18)
BUN/CREAT SERPL: 14 (ref 6–20)
CALCIUM SERPL-MCNC: 9 MG/DL (ref 8.5–10.1)
CHLORIDE SERPL-SCNC: 105 MMOL/L (ref 98–107)
CO2 SERPL-SCNC: 26 MMOL/L (ref 21–32)
CREAT SERPL-MCNC: 1.05 MG/DL (ref 0.55–1.02)
DIFFERENTIAL METHOD BLD: ABNORMAL
EGFR (NO RACE VARIABLE) (RUSH/TITUS): 63 ML/MIN/1.73M²
EOSINOPHIL # BLD AUTO: 0.21 K/UL (ref 0–0.5)
EOSINOPHIL NFR BLD AUTO: 2.6 % (ref 1–4)
ERYTHROCYTE [DISTWIDTH] IN BLOOD BY AUTOMATED COUNT: 13.1 % (ref 11.5–14.5)
GLUCOSE SERPL-MCNC: 108 MG/DL (ref 74–106)
HCT VFR BLD AUTO: 41.7 % (ref 38–47)
HGB BLD-MCNC: 13.1 G/DL (ref 12–16)
IMM GRANULOCYTES # BLD AUTO: 0.02 K/UL (ref 0–0.04)
IMM GRANULOCYTES NFR BLD: 0.2 % (ref 0–0.4)
LYMPHOCYTES # BLD AUTO: 3.08 K/UL (ref 1–4.8)
LYMPHOCYTES NFR BLD AUTO: 38.4 % (ref 27–41)
MCH RBC QN AUTO: 28.4 PG (ref 27–31)
MCHC RBC AUTO-ENTMCNC: 31.4 G/DL (ref 32–36)
MCV RBC AUTO: 90.3 FL (ref 80–96)
MONOCYTES # BLD AUTO: 0.53 K/UL (ref 0–0.8)
MONOCYTES NFR BLD AUTO: 6.6 % (ref 2–6)
MPC BLD CALC-MCNC: 9.7 FL (ref 9.4–12.4)
NEUTROPHILS # BLD AUTO: 4.11 K/UL (ref 1.8–7.7)
NEUTROPHILS NFR BLD AUTO: 51.2 % (ref 53–65)
NRBC # BLD AUTO: 0 X10E3/UL
NRBC, AUTO (.00): 0 %
PLATELET # BLD AUTO: 331 K/UL (ref 150–400)
POTASSIUM SERPL-SCNC: 4 MMOL/L (ref 3.5–5.1)
RBC # BLD AUTO: 4.62 M/UL (ref 4.2–5.4)
SODIUM SERPL-SCNC: 140 MMOL/L (ref 136–145)
WBC # BLD AUTO: 8.03 K/UL (ref 4.5–11)

## 2022-12-06 PROCEDURE — G0378 HOSPITAL OBSERVATION PER HR: HCPCS

## 2022-12-06 PROCEDURE — 94761 N-INVAS EAR/PLS OXIMETRY MLT: CPT | Mod: 59

## 2022-12-06 PROCEDURE — 63600175 PHARM REV CODE 636 W HCPCS: Performed by: INTERNAL MEDICINE

## 2022-12-06 PROCEDURE — 27201423 OPTIME MED/SURG SUP & DEVICES STERILE SUPPLY: Performed by: STUDENT IN AN ORGANIZED HEALTH CARE EDUCATION/TRAINING PROGRAM

## 2022-12-06 PROCEDURE — 25000003 PHARM REV CODE 250: Performed by: STUDENT IN AN ORGANIZED HEALTH CARE EDUCATION/TRAINING PROGRAM

## 2022-12-06 PROCEDURE — 63600175 PHARM REV CODE 636 W HCPCS: Performed by: STUDENT IN AN ORGANIZED HEALTH CARE EDUCATION/TRAINING PROGRAM

## 2022-12-06 PROCEDURE — C1887 CATHETER, GUIDING: HCPCS | Performed by: STUDENT IN AN ORGANIZED HEALTH CARE EDUCATION/TRAINING PROGRAM

## 2022-12-06 PROCEDURE — 25500020 PHARM REV CODE 255: Performed by: STUDENT IN AN ORGANIZED HEALTH CARE EDUCATION/TRAINING PROGRAM

## 2022-12-06 PROCEDURE — C1894 INTRO/SHEATH, NON-LASER: HCPCS | Performed by: STUDENT IN AN ORGANIZED HEALTH CARE EDUCATION/TRAINING PROGRAM

## 2022-12-06 PROCEDURE — 85025 COMPLETE CBC W/AUTO DIFF WBC: CPT | Performed by: REGISTERED NURSE

## 2022-12-06 PROCEDURE — 25000003 PHARM REV CODE 250: Performed by: REGISTERED NURSE

## 2022-12-06 PROCEDURE — 85730 THROMBOPLASTIN TIME PARTIAL: CPT | Performed by: FAMILY MEDICINE

## 2022-12-06 PROCEDURE — 93458 L HRT ARTERY/VENTRICLE ANGIO: CPT | Mod: 59 | Performed by: STUDENT IN AN ORGANIZED HEALTH CARE EDUCATION/TRAINING PROGRAM

## 2022-12-06 PROCEDURE — 63600175 PHARM REV CODE 636 W HCPCS: Performed by: FAMILY MEDICINE

## 2022-12-06 PROCEDURE — 93458 L HRT ARTERY/VENTRICLE ANGIO: CPT | Mod: 26,59,51, | Performed by: STUDENT IN AN ORGANIZED HEALTH CARE EDUCATION/TRAINING PROGRAM

## 2022-12-06 PROCEDURE — 93458 PR CATH PLACE/CORON ANGIO, IMG SUPER/INTERP,W LEFT HEART VENTRICULOGRAPHY: ICD-10-PCS | Mod: 26,59,51, | Performed by: STUDENT IN AN ORGANIZED HEALTH CARE EDUCATION/TRAINING PROGRAM

## 2022-12-06 PROCEDURE — 99153 MOD SED SAME PHYS/QHP EA: CPT | Performed by: STUDENT IN AN ORGANIZED HEALTH CARE EDUCATION/TRAINING PROGRAM

## 2022-12-06 PROCEDURE — 99225 PR SUBSEQUENT OBSERVATION CARE,LEVEL II: CPT | Mod: GC,,, | Performed by: FAMILY MEDICINE

## 2022-12-06 PROCEDURE — 36415 COLL VENOUS BLD VENIPUNCTURE: CPT | Performed by: FAMILY MEDICINE

## 2022-12-06 PROCEDURE — 25000003 PHARM REV CODE 250: Performed by: FAMILY MEDICINE

## 2022-12-06 PROCEDURE — 99152 MOD SED SAME PHYS/QHP 5/>YRS: CPT | Performed by: STUDENT IN AN ORGANIZED HEALTH CARE EDUCATION/TRAINING PROGRAM

## 2022-12-06 PROCEDURE — 96366 THER/PROPH/DIAG IV INF ADDON: CPT

## 2022-12-06 PROCEDURE — C1769 GUIDE WIRE: HCPCS | Performed by: STUDENT IN AN ORGANIZED HEALTH CARE EDUCATION/TRAINING PROGRAM

## 2022-12-06 PROCEDURE — 80048 BASIC METABOLIC PNL TOTAL CA: CPT | Performed by: FAMILY MEDICINE

## 2022-12-06 PROCEDURE — 96376 TX/PRO/DX INJ SAME DRUG ADON: CPT

## 2022-12-06 PROCEDURE — 99225 PR SUBSEQUENT OBSERVATION CARE,LEVEL II: ICD-10-PCS | Mod: GC,,, | Performed by: FAMILY MEDICINE

## 2022-12-06 PROCEDURE — C1874 STENT, COATED/COV W/DEL SYS: HCPCS | Performed by: STUDENT IN AN ORGANIZED HEALTH CARE EDUCATION/TRAINING PROGRAM

## 2022-12-06 PROCEDURE — 92928 PRQ TCAT PLMT NTRAC ST 1 LES: CPT | Mod: LC,,, | Performed by: STUDENT IN AN ORGANIZED HEALTH CARE EDUCATION/TRAINING PROGRAM

## 2022-12-06 PROCEDURE — C9600 PERC DRUG-EL COR STENT SING: HCPCS | Mod: LC | Performed by: STUDENT IN AN ORGANIZED HEALTH CARE EDUCATION/TRAINING PROGRAM

## 2022-12-06 PROCEDURE — 92928 PR STENT: ICD-10-PCS | Mod: LC,,, | Performed by: STUDENT IN AN ORGANIZED HEALTH CARE EDUCATION/TRAINING PROGRAM

## 2022-12-06 RX ORDER — FENTANYL CITRATE 50 UG/ML
INJECTION, SOLUTION INTRAMUSCULAR; INTRAVENOUS
Status: DISCONTINUED | OUTPATIENT
Start: 2022-12-06 | End: 2022-12-06 | Stop reason: HOSPADM

## 2022-12-06 RX ORDER — NITROGLYCERIN 5 MG/ML
INJECTION, SOLUTION INTRAVENOUS
Status: DISCONTINUED | OUTPATIENT
Start: 2022-12-06 | End: 2022-12-06 | Stop reason: HOSPADM

## 2022-12-06 RX ORDER — ACETAMINOPHEN 325 MG/1
650 TABLET ORAL EVERY 4 HOURS PRN
Status: DISCONTINUED | OUTPATIENT
Start: 2022-12-06 | End: 2022-12-07 | Stop reason: HOSPADM

## 2022-12-06 RX ORDER — LIDOCAINE HYDROCHLORIDE 10 MG/ML
INJECTION, SOLUTION EPIDURAL; INFILTRATION; INTRACAUDAL; PERINEURAL
Status: DISCONTINUED | OUTPATIENT
Start: 2022-12-06 | End: 2022-12-06 | Stop reason: HOSPADM

## 2022-12-06 RX ORDER — HEPARIN SOD,PORCINE/0.9 % NACL 1000/500ML
INTRAVENOUS SOLUTION INTRAVENOUS
Status: DISCONTINUED | OUTPATIENT
Start: 2022-12-06 | End: 2022-12-06 | Stop reason: HOSPADM

## 2022-12-06 RX ORDER — MIDAZOLAM HYDROCHLORIDE 1 MG/ML
INJECTION INTRAMUSCULAR; INTRAVENOUS
Status: DISCONTINUED | OUTPATIENT
Start: 2022-12-06 | End: 2022-12-06 | Stop reason: HOSPADM

## 2022-12-06 RX ORDER — SODIUM CHLORIDE 450 MG/100ML
INJECTION, SOLUTION INTRAVENOUS
Status: DISCONTINUED | OUTPATIENT
Start: 2022-12-06 | End: 2022-12-07 | Stop reason: HOSPADM

## 2022-12-06 RX ORDER — VERAPAMIL HYDROCHLORIDE 2.5 MG/ML
INJECTION, SOLUTION INTRAVENOUS
Status: DISCONTINUED | OUTPATIENT
Start: 2022-12-06 | End: 2022-12-06 | Stop reason: HOSPADM

## 2022-12-06 RX ORDER — ONDANSETRON 4 MG/1
8 TABLET, ORALLY DISINTEGRATING ORAL EVERY 8 HOURS PRN
Status: DISCONTINUED | OUTPATIENT
Start: 2022-12-06 | End: 2022-12-07 | Stop reason: HOSPADM

## 2022-12-06 RX ORDER — HEPARIN SODIUM 1000 [USP'U]/ML
INJECTION, SOLUTION INTRAVENOUS; SUBCUTANEOUS
Status: DISCONTINUED | OUTPATIENT
Start: 2022-12-06 | End: 2022-12-06 | Stop reason: HOSPADM

## 2022-12-06 RX ADMIN — TICAGRELOR 90 MG: 90 TABLET ORAL at 08:12

## 2022-12-06 RX ADMIN — NITROGLYCERIN 0.5 INCH: 20 OINTMENT TOPICAL at 05:12

## 2022-12-06 RX ADMIN — ATORVASTATIN CALCIUM 80 MG: 80 TABLET, FILM COATED ORAL at 08:12

## 2022-12-06 RX ADMIN — ACETAMINOPHEN 650 MG: 325 TABLET ORAL at 10:12

## 2022-12-06 RX ADMIN — HEPARIN SODIUM 32.03 UNITS/KG/HR: 10000 INJECTION, SOLUTION INTRAVENOUS at 07:12

## 2022-12-06 NOTE — H&P
[facility] - Emergency Department  Hospital Medicine  History & Physical    Patient Name: Johana Higgins  MRN: [id number]  Patient Class: OP- Observation  Admission Date: 12/5/2022  Attending Physician: Sesar Cordero Jr., MD   Primary Care Provider: Primary Doctor No         Patient information was obtained from patient, spouse/SO and ER records.     Subjective:     Principal Problem:ACS (acute coronary syndrome)    Chief Complaint:   Chief Complaint   Patient presents with    Chest Pain        HPI: 52 y.o. female with no reported PMHx reports she was sent to the ED by Dr. Cotter (cardiology) today for c/o left-sided chest pain described as pressure which radiates to left jaw and scross the chest. Pt reports similar symptoms previously on 12/1/22 of chest pain with hospitalization and a plan for a cath by cardiology. However, patient reports she left against medical advice 2 days ago. Pt reports she would like to get a cath and further care on this admission. Troponin trend from 2 days ago 240 -> 480 -> 2747. She reports some associated nausea, diaphoresis, dyspnea, orthopnea but denies PND. Pt reports fainting spells with loss of urine control since she was 8 years old but states she does not want medical evaluation for it. She denies HA, blurry vision, dysphagia, diarrhea, or any other complaints at this time.     Pt reports no known medical problems but states a strong family history of heart disease and 50 pack year smoking history. Pt states she takes no daily medications except for her 's lasix whenever she gets LE swelling, OTOOLE, or orthopnea. Echo from 12/3/22 shows EF 55 with normal systolic and diastolic function and segmental left ventricular wall motion abnormalities. Pt has been a 1 ppd smoker since the age of 12. She denies EtOH consumption. She reports daily smoking of marijuana. Pt lives at home with her . Patient is very independent and use to carrying out all  LUCIOs with no assistance.     Workup thus far has been significant for the following: Initial troponin 3261 which is increased from 2747 on discharge 2 days ago.  EKG reveals normal sinus rhythm with nonspecific ST changes.  CXR reveals no acute cardiopulmonary process. Repeat troponin and EKG pending. CBC and CMP are grossly WNL. Dr. Erazo (cardiology evaluated the patient in the ED and patient will receive a heart cath.     Pt will be admitted to [facility] under the supervision of Dr. Cordero for further evaluation and management of NSTE-ACS with consultation from cardiology.       History reviewed. No pertinent past medical history.    History reviewed. No pertinent surgical history.    Review of patient's allergies indicates:   Allergen Reactions    Penicillin Hives       No current facility-administered medications on file prior to encounter.     No current outpatient medications on file prior to encounter.     Family History       Problem Relation (Age of Onset)    Heart attack Father, Brother, Maternal Grandfather    Heart disease Paternal Grandfather          Tobacco Use    Smoking status: Every Day     Types: Cigarettes    Smokeless tobacco: Not on file   Substance and Sexual Activity    Alcohol use: Never    Drug use: Yes     Types: Marijuana    Sexual activity: Yes     Review of Systems   Constitutional:  Positive for diaphoresis. Negative for chills and fever.   HENT:  Negative for congestion, hearing loss and trouble swallowing.    Eyes:  Negative for visual disturbance.   Respiratory:  Positive for chest tightness. Negative for cough and shortness of breath.    Cardiovascular:  Positive for chest pain. Negative for palpitations.   Gastrointestinal:  Negative for abdominal pain, blood in stool, diarrhea, nausea and vomiting.   Genitourinary:  Negative for difficulty urinating and hematuria.   Musculoskeletal:  Negative for back pain and myalgias.   Skin:  Negative for rash.   Neurological:  Positive  for syncope (intermittent since she was 8 years old). Negative for dizziness, light-headedness and headaches.   Psychiatric/Behavioral:  Negative for sleep disturbance. The patient is not nervous/anxious.    Objective:     Vital Signs (Most Recent):  Temp: 98.1 °F (36.7 °C) (12/05/22 1409)  Pulse: 85 (12/05/22 1744)  Resp: (!) 22 (12/05/22 1419)  BP: 118/85 (12/05/22 1744)  SpO2: 97 % (12/05/22 1719)   Vital Signs (24h Range):  Temp:  [98.1 °F (36.7 °C)] 98.1 °F (36.7 °C)  Pulse:  [85-96] 85  Resp:  [18-22] 22  SpO2:  [96 %-99 %] 97 %  BP: (101-123)/(77-87) 118/85     Weight: 61.9 kg (136 lb 8 oz)  Body mass index is 24.18 kg/m².    Physical Exam  Vitals reviewed.   Constitutional:       General: She is not in acute distress.     Appearance: Normal appearance. She is normal weight. She is not ill-appearing, toxic-appearing or diaphoretic.   HENT:      Head: Normocephalic and atraumatic.      Right Ear: External ear normal.      Left Ear: External ear normal.      Nose: Nose normal.      Mouth/Throat:      Mouth: Mucous membranes are moist.      Pharynx: Oropharynx is clear.   Eyes:      General: No scleral icterus.     Extraocular Movements: Extraocular movements intact.      Conjunctiva/sclera: Conjunctivae normal.      Pupils: Pupils are equal, round, and reactive to light.   Cardiovascular:      Rate and Rhythm: Normal rate and regular rhythm.      Pulses: Normal pulses.      Heart sounds: Normal heart sounds. No murmur heard.  Pulmonary:      Effort: Pulmonary effort is normal. No respiratory distress.      Breath sounds: Normal breath sounds. No wheezing, rhonchi or rales.   Abdominal:      General: Abdomen is flat. Bowel sounds are normal. There is no distension.      Palpations: Abdomen is soft.      Tenderness: There is no abdominal tenderness. There is no guarding or rebound.   Musculoskeletal:         General: No swelling. Normal range of motion.      Cervical back: Normal range of motion and neck supple.  No rigidity.   Skin:     General: Skin is warm and dry.      Capillary Refill: Capillary refill takes less than 2 seconds.      Findings: No rash.   Neurological:      General: No focal deficit present.      Mental Status: She is alert and oriented to person, place, and time. Mental status is at baseline.   Psychiatric:         Mood and Affect: Mood normal.         Behavior: Behavior normal.         Thought Content: Thought content normal.         Judgment: Judgment normal.         CRANIAL NERVES     CN III, IV, VI   Pupils are equal, round, and reactive to light.     Significant Labs: All pertinent labs within the past 24 hours have been reviewed.  Recent Lab Results         12/05/22  1626   12/05/22  1605   12/05/22  1447        Albumin/Globulin Ratio     0.9       Albumin     3.4       Alkaline Phosphatase     80       ALT     28       Anion Gap     14       aPTT 25.4   24.4         AST     39       Baso #   0.06   0.10       Basophil %   0.6   1.2       BILIRUBIN TOTAL     0.3       BUN     14       BUN/CREAT RATIO     18       Calcium     9.1       Chloride     104       CO2     24       Creatinine     0.80       Differential Type   Auto   Auto       eGFR     88       Eos #   0.18   0.16       Eosinophil %   1.9   1.9       Globulin, Total     3.7       Glucose     102       Hematocrit   42.3   42.9       Hemoglobin   13.7   13.9       Immature Grans (Abs)   0.03   0.02       Immature Granulocytes   0.3   0.2       INR 1.01   1.02         Lymph #   2.43   2.21       Lymph %   26.3   26.2       MCH   28.5   28.5       MCHC   32.4   32.4       MCV   88.1   87.9       Mono #   0.51   0.49       Mono %   5.5   5.8       MPV   9.1   9.6       Neutrophils, Abs   6.04   5.44       Neutrophils Relative   65.4   64.7       nRBC   0.0   0.0       NT-proBNP     3,306       NUCLEATED RBC ABSOLUTE   0.00   0.00       Platelets   310   334       Potassium     4.3       PROTEIN TOTAL     7.1       Protime 12.9   13.0          RBC   4.80   4.88       RDW   12.8   13.0       Sodium     138       Troponin I High Sensitivity     3,261.6       WBC   9.25   8.42               Significant Imaging: I have reviewed all pertinent imaging results/findings within the past 24 hours.    Assessment/Plan:     * NSTE-ACS  -initial troponin 3261. EKG NSR with nonspecific ST changes. Repeat troponin and EKG pending.   -Cardiology consulted. Dr. Erazo's assistance appreciated. Plan for [facility] tomorrow. NPO in preparation.  -ALICE score: 4 - 20% risk at 14 days of: all-cause mortality, new or recurrent MI, or severe recurrent ischemia requiring urgent revascularization.  -Lipid panel, high dose statin  -morphine PRN, nitro paste, oxygen supplementation PRN  -  Mg in ED then 81 mg daily  -toprol XL 12.5 mg qd  - Echo 12/3/22 - LV nml in size with normal systolic and LV diastolic function. EF 55%. Normal RV size, Moderate MR and TR. There are segmental left ventricular wall motion abnormalities.    HLD (hyperlipidemia)  -start high dose statin PO QHS    Tobacco dependence  -smoking cessation provided  -will add nicotine patch daily.      VTE Risk Mitigation (From admission, onward)         Ordered     IP VTE HIGH RISK PATIENT  Once         12/05/22 1821     Place sequential compression device  Until discontinued         12/05/22 1821     heparin 25,000 units in dextrose 5% (100 units/ml) IV bolus from bag INITIAL BOLUS (max bolus 4000 units)  Once        Question:  Heparin Infusion Adjustment (DO NOT MODIFY ANSWER)  Answer:  [email or web address]\epic\Images\Pharmacy\HeparinInfusions\heparin LOW INTENSITY nomogram for [place] [id number]    12/05/22 1616     heparin 25,000 units in dextrose 5% 250 mL (100 units/mL) infusion LOW INTENSITY nomogram - [facility]  Continuous        Question:  Begin at (in units/kg/hr)  Answer:  12    12/05/22 1616     heparin 25,000 units in dextrose 5% 250 mL (100 units/mL) infusion LOW INTENSITY nomogram - [facility]  Continuous        Question:   Begin at (in units/kg/hr)  Answer:  12    12/05/22 1555     heparin 25,000 units in dextrose 5% (100 units/ml) IV bolus from bag ADDITIONAL PRN BOLUS - 60 units/kg (max bolus 4000 units)  As needed (PRN)        Question:  Heparin Infusion Adjustment (DO NOT MODIFY ANSWER)  Answer:  [email or web address]\epic\Images\Pharmacy\HeparinInfusions\heparin LOW INTENSITY nomogram for [place] [id number].pdf    12/05/22 1555     heparin 25,000 units in dextrose 5% (100 units/ml) IV bolus from bag - ADDITIONAL PRN BOLUS - 30 units/kg (max bolus 4000 units)  As needed (PRN)        Question:  Heparin Infusion Adjustment (DO NOT MODIFY ANSWER)  Answer:  [email or web address]\epic\Images\Pharmacy\HeparinInfusions\heparin LOW INTENSITY nomogram for [place] [id number].pdf    12/05/22 1555     heparin 25,000 units in dextrose 5% (100 units/ml) IV bolus from bag ADDITIONAL PRN BOLUS - 60 units/kg (max bolus 4000 units)  As needed (PRN)        Question:  Heparin Infusion Adjustment (DO NOT MODIFY ANSWER)  Answer:  [email or web address]\epic\Images\Pharmacy\HeparinInfusions\heparin LOW INTENSITY nomogram for [place] [id number].pdf    12/05/22 1616     heparin 25,000 units in dextrose 5% (100 units/ml) IV bolus from bag - ADDITIONAL PRN BOLUS - 30 units/kg (max bolus 4000 units)  As needed (PRN)        Question:  Heparin Infusion Adjustment (DO NOT MODIFY ANSWER)  Answer:  [email or web address]\epic\Images\Pharmacy\HeparinInfusions\heparin LOW INTENSITY nomogram for [place] [id number]    12/05/22 1616                   Abbyshalom Coronel DO  Department of Hospital Medicine   [facility] - Emergency Department

## 2022-12-06 NOTE — HPI
52 y.o. female with no reported PMHx reports she was sent to the ED by Dr. Cotter (cardiology) today for c/o left-sided chest pain described as pressure which radiates to left jaw and scross the chest. Pt reports similar symptoms previously on 12/1/22 of chest pain with hospitalization and a plan for a cath by cardiology. However, patient reports she left against medical advice 2 days ago. Pt reports she would like to get a cath and further care on this admission. Troponin trend from 2 days ago 240 -> 480 -> 2747. She reports some associated nausea, diaphoresis, dyspnea, orthopnea but denies PND. Pt reports fainting spells with loss of urine control since she was 8 years old but states she does not want medical evaluation for it. She denies HA, blurry vision, dysphagia, diarrhea, or any other complaints at this time.     Pt reports no known medical problems but states a strong family history of heart disease and 50 pack year smoking history. Pt states she takes no daily medications except for her 's lasix whenever she gets LE swelling, OTOOLE, or orthopnea. Echo from 12/3/22 shows EF 55 with normal systolic and diastolic function and segmental left ventricular wall motion abnormalities. Pt has been a 1 ppd smoker since the age of 12. She denies EtOH consumption. She reports daily smoking of marijuana. Pt lives at home with her . Patient is very independent and use to carrying out all MRADLs with no assistance.     Workup thus far has been significant for the following: Initial troponin 3261 which is increased from 2747 on discharge 2 days ago.  EKG reveals normal sinus rhythm with nonspecific ST changes.  CXR reveals no acute cardiopulmonary process. Repeat troponin and EKG pending. CBC and CMP are grossly WNL. Dr. Erazo (cardiology evaluated the patient in the ED and patient will receive a heart cath.     Pt will be admitted to [facility] under the supervision of Dr. Cordero for further evaluation and  management of NSTE-ACS with consultation from cardiology.

## 2022-12-06 NOTE — NURSING
Patient had new ptt drawn, 25.1 new bolus of 60units/kg will be given and new dose change of 15 units/kg/hr and new rate of 8.4 ml/hr

## 2022-12-06 NOTE — PROGRESS NOTES
[facility] - Short Stay [facility] Medicine  Progress Note    Patient Name: Johana Higgins  MRN: [id number]  Patient Class: OP- Observation   Admission Date: 12/5/2022  Length of Stay: 0 days  Attending Physician: Sesar Cordero Jr., MD  Primary Care Provider: Primary Doctor No        Subjective:     Principal Problem:ACS (acute coronary syndrome)        HPI:  52 y.o. female with no reported PMHx reports she was sent to the ED by Dr. Cotter (cardiology) today for c/o left-sided chest pain described as pressure which radiates to left jaw and scross the chest. Pt reports similar symptoms previously on 12/1/22 of chest pain with hospitalization and a plan for a cath by cardiology. However, patient reports she left against medical advice 2 days ago. Pt reports she would like to get a cath and further care on this admission. Troponin trend from 2 days ago 240 -> 480 -> 2747. She reports some associated nausea, diaphoresis, dyspnea, orthopnea but denies PND. Pt reports fainting spells with loss of urine control since she was 8 years old but states she does not want medical evaluation for it. She denies HA, blurry vision, dysphagia, diarrhea, or any other complaints at this time.     Pt reports no known medical problems but states a strong family history of heart disease and 50 pack year smoking history. Pt states she takes no daily medications except for her 's lasix whenever she gets LE swelling, OTOOLE, or orthopnea. Echo from 12/3/22 shows EF 55 with normal systolic and diastolic function and segmental left ventricular wall motion abnormalities. Pt has been a 1 ppd smoker since the age of 12. She denies EtOH consumption. She reports daily smoking of marijuana. Pt lives at home with her . Patient is very independent and use to carrying out all MRADLs with no assistance.     Workup thus far has been significant for the following: Initial troponin 3261 which is increased from 2747 on  discharge 2 days ago.  EKG reveals normal sinus rhythm with nonspecific ST changes.  CXR reveals no acute cardiopulmonary process. Repeat troponin and EKG pending. CBC and CMP are grossly WNL. Dr. Erazo (cardiology evaluated the patient in the ED and patient will receive a heart cath.     Pt will be admitted to [facility] under the supervision of Dr. Cordero for further evaluation and management of NSTE-ACS with consultation from cardiology.       Overview/Hospital Course:  No notes on file    Interval History: Pt evaluated at bedside this am. She is resting comfortably in bed, in no acute distress. Family is in the room at the time of my exam. She reports HA but denies any CP, SOB, palpitations, diaphoresis, n/v/d or any other sx.     Pt is NPO. Plan is for [facility] today.     Review of Systems   Constitutional:  Negative for chills, fatigue and fever.   HENT:  Negative for sore throat.    Eyes:  Negative for visual disturbance.   Respiratory:  Negative for cough and shortness of breath.    Cardiovascular:  Negative for chest pain, palpitations and leg swelling.   Gastrointestinal:  Negative for abdominal pain, constipation, diarrhea, nausea, rectal pain and vomiting.   Genitourinary:  Negative for dysuria and hematuria.   Musculoskeletal:  Negative for arthralgias and myalgias.   Skin:  Negative for rash.   Neurological:  Positive for headaches. Negative for dizziness, light-headedness and numbness.   Objective:     Vital Signs (Most Recent):  Temp: 98 °F (36.7 °C) (12/06/22 1330)  Pulse: 71 (12/06/22 1330)  Resp: 18 (12/06/22 1330)  BP: 115/67 (12/06/22 1330)  SpO2: 95 % (12/06/22 1330) Vital Signs (24h Range):  Temp:  [97.5 °F (36.4 °C)-98 °F (36.7 °C)] 98 °F (36.7 °C)  Pulse:  [69-92] 71  Resp:  [18-20] 18  SpO2:  [93 %-99 %] 95 %  BP: (101-128)/(66-87) 115/67     Weight: 61.3 kg (135 lb 2.3 oz)  Body mass index is 23.94 kg/m².    Intake/Output Summary (Last 24 hours) at 12/6/2022 6853  Last data filed at 12/6/2022  0458  Gross per 24 hour   Intake --   Output 2 ml   Net -2 ml      Physical Exam  Vitals reviewed.   Constitutional:       General: She is not in acute distress.     Appearance: She is not ill-appearing, toxic-appearing or diaphoretic.   HENT:      Head: Normocephalic and atraumatic.      Right Ear: External ear normal.      Left Ear: External ear normal.      Mouth/Throat:      Mouth: Mucous membranes are moist.   Eyes:      General: No scleral icterus.     Pupils: Pupils are equal, round, and reactive to light.   Cardiovascular:      Rate and Rhythm: Normal rate and regular rhythm.      Heart sounds: Normal heart sounds. No murmur heard.    No friction rub. No gallop.   Pulmonary:      Effort: Pulmonary effort is normal. No respiratory distress.      Breath sounds: Normal breath sounds. No wheezing, rhonchi or rales.   Abdominal:      General: Bowel sounds are normal.      Palpations: Abdomen is soft.      Tenderness: There is no abdominal tenderness. There is no guarding or rebound.   Musculoskeletal:         General: No swelling.      Right lower leg: No edema.      Left lower leg: No edema.   Skin:     General: Skin is warm and dry.      Coloration: Skin is not jaundiced.      Findings: No erythema or rash.   Neurological:      Mental Status: She is alert and oriented to person, place, and time.       Significant Labs: All pertinent labs within the past 24 hours have been reviewed.    Significant Imaging: I have reviewed all pertinent imaging results/findings within the past 24 hours.      Assessment/Plan:      * NSTE-ACS  Initial troponin 3261; subsequent troponin 3,421.   Plan is for [facility] today  ALICE score: 4 - 20% risk at 14 days of: all-cause mortality, new or recurrent MI, or severe recurrent ischemia requiring urgent revascularization  Morphine PRN, nitro paste, oxygen supplementation PRN  ASA 81 mg daily  Toprol XL 12.5 mg qd  Echo 12/3/22 - LV nml in size with normal systolic and LV diastolic function.  EF 55%. Normal RV size, Moderate MR and TR. There are segmental left ventricular wall motion abnormalities.    HLD (hyperlipidemia)  Atorvastatin 80 mg     Tobacco dependence  Smoking cessation provided  Nicotine patch       VTE Risk Mitigation (From admission, onward)         Ordered     IP VTE HIGH RISK PATIENT  Once         12/05/22 1821     Place sequential compression device  Until discontinued         12/05/22 1821                Discharge Planning   MACIEL:      Code Status: Full Code   Is the patient medically ready for discharge?:     Reason for patient still in hospital (select all that apply): Treatment                     David Uriarte DO  Department of Hospital Medicine   [facility] - Short Stay Unit

## 2022-12-06 NOTE — NURSING
1705 TR band removal complete at this time. No active bleeding to site noted. Pulses palpable. Pressure dressing applied.

## 2022-12-06 NOTE — SUBJECTIVE & OBJECTIVE
Interval History: Pt evaluated at bedside this am. She is resting comfortably in bed, in no acute distress. Family is in the room at the time of my exam. She reports HA but denies any CP, SOB, palpitations, diaphoresis, n/v/d or any other sx.     Pt is NPO. Plan is for [facility] today.     Review of Systems   Constitutional:  Negative for chills, fatigue and fever.   HENT:  Negative for sore throat.    Eyes:  Negative for visual disturbance.   Respiratory:  Negative for cough and shortness of breath.    Cardiovascular:  Negative for chest pain, palpitations and leg swelling.   Gastrointestinal:  Negative for abdominal pain, constipation, diarrhea, nausea, rectal pain and vomiting.   Genitourinary:  Negative for dysuria and hematuria.   Musculoskeletal:  Negative for arthralgias and myalgias.   Skin:  Negative for rash.   Neurological:  Positive for headaches. Negative for dizziness, light-headedness and numbness.   Objective:     Vital Signs (Most Recent):  Temp: 98 °F (36.7 °C) (12/06/22 1330)  Pulse: 71 (12/06/22 1330)  Resp: 18 (12/06/22 1330)  BP: 115/67 (12/06/22 1330)  SpO2: 95 % (12/06/22 1330) Vital Signs (24h Range):  Temp:  [97.5 °F (36.4 °C)-98 °F (36.7 °C)] 98 °F (36.7 °C)  Pulse:  [69-92] 71  Resp:  [18-20] 18  SpO2:  [93 %-99 %] 95 %  BP: (101-128)/(66-87) 115/67     Weight: 61.3 kg (135 lb 2.3 oz)  Body mass index is 23.94 kg/m².    Intake/Output Summary (Last 24 hours) at 12/6/2022 1425  Last data filed at 12/6/2022 0458  Gross per 24 hour   Intake --   Output 2 ml   Net -2 ml      Physical Exam  Vitals reviewed.   Constitutional:       General: She is not in acute distress.     Appearance: She is not ill-appearing, toxic-appearing or diaphoretic.   HENT:      Head: Normocephalic and atraumatic.      Right Ear: External ear normal.      Left Ear: External ear normal.      Mouth/Throat:      Mouth: Mucous membranes are moist.   Eyes:      General: No scleral icterus.     Pupils: Pupils are equal, round, and  reactive to light.   Cardiovascular:      Rate and Rhythm: Normal rate and regular rhythm.      Heart sounds: Normal heart sounds. No murmur heard.    No friction rub. No gallop.   Pulmonary:      Effort: Pulmonary effort is normal. No respiratory distress.      Breath sounds: Normal breath sounds. No wheezing, rhonchi or rales.   Abdominal:      General: Bowel sounds are normal.      Palpations: Abdomen is soft.      Tenderness: There is no abdominal tenderness. There is no guarding or rebound.   Musculoskeletal:         General: No swelling.      Right lower leg: No edema.      Left lower leg: No edema.   Skin:     General: Skin is warm and dry.      Coloration: Skin is not jaundiced.      Findings: No erythema or rash.   Neurological:      Mental Status: She is alert and oriented to person, place, and time.       Significant Labs: All pertinent labs within the past 24 hours have been reviewed.    Significant Imaging: I have reviewed all pertinent imaging results/findings within the past 24 hours.

## 2022-12-06 NOTE — PLAN OF CARE
[facility] - Short Stay Unit  Initial Discharge Assessment       Primary Care Provider: Primary Doctor No    Admission Diagnosis: ACS (acute coronary syndrome) [I24.9]  Chest pain [R07.9]    Admission Date: 12/5/2022  Expected Discharge Date:     Discharge Barriers Identified: None    Payor: [employer] / Plan: [employer] [place] / Product Type: Commercial /     Extended Emergency Contact Information  Primary Emergency Contact: RonaldoCesia  Mobile Phone: [phone]  Relation: Spouse  Preferred language: English   needed? No    Discharge Plan A: Home  Discharge Plan B: Home      [facility], [place] - [address]  [address]  [facility] [address]  Phone: [phone] Fax: [phone]    [facility] [place], [place] - [address]  [address]  [place] [address]  Phone: [phone] Fax: [phone]      Initial Assessment (most recent)       Adult Discharge Assessment - 12/06/22 1450          Discharge Assessment    Assessment Type Discharge Planning Assessment     Confirmed/corrected address, phone number and insurance Yes     Confirmed Demographics Correct on Facesheet     Source of Information patient     Does patient/caregiver understand observation status Yes     Communicated MACIEL with patient/caregiver Date not available/Unable to determine     People in Home spouse     Do you expect to return to your current living situation? Yes     Do you have help at home or someone to help you manage your care at home? Yes     Prior to hospitilization cognitive status: Unable to Assess     Current cognitive status: Alert/Oriented     Equipment Currently Used at Home none     Readmission within 30 days? Yes     Patient currently being followed by outpatient case management? No     Do you currently have service(s) that help you manage your care at home? No     Do you take prescription medications? No     Do you have prescription  coverage? Yes     Do you have any problems affording any of your prescribed medications? No     How do you get to doctors appointments? family or friend will provide;car, drives self     Are you on dialysis? No     Do you take coumadin? No     Discharge Plan A Home     Discharge Plan B Home     DME Needed Upon Discharge  none     Discharge Plan discussed with: Patient     Discharge Barriers Identified None        Physical Activity    On average, how many days per week do you engage in moderate to strenuous exercise (like a brisk walk)? 3 days     On average, how many minutes do you engage in exercise at this level? 20 min        Financial Resource Strain    How hard is it for you to pay for the very basics like food, housing, medical care, and heating? Not hard at all        Housing Stability    In the last 12 months, was there a time when you were not able to pay the mortgage or rent on time? No     In the last 12 months, how many places have you lived? 1     In the last 12 months, was there a time when you did not have a steady place to sleep or slept in a shelter (including now)? No        Transportation Needs    In the past 12 months, has lack of transportation kept you from medical appointments or from getting medications? No     In the past 12 months, has lack of transportation kept you from meetings, work, or from getting things needed for daily living? No        Food Insecurity    Within the past 12 months, you worried that your food would run out before you got the money to buy more. Never true     Within the past 12 months, the food you bought just didn't last and you didn't have money to get more. Never true        Stress    Do you feel stress - tense, restless, nervous, or anxious, or unable to sleep at night because your mind is troubled all the time - these days? Not at all        Social Connections    In a typical week, how many times do you talk on the phone with family, friends, or neighbors? Once a  week     How often do you get together with friends or relatives? Once a week     How often do you attend Baptism or Cheondoism services? Never     Do you belong to any clubs or organizations such as Baptism groups, unions, fraternal or athletic groups, or school groups? No     How often do you attend meetings of the clubs or organizations you belong to? Never     Are you , , , , never , or living with a partner?         Alcohol Use    Q1: How often do you have a drink containing alcohol? Never     Q2: How many drinks containing alcohol do you have on a typical day when you are drinking? Patient does not drink     Q3: How often do you have six or more drinks on one occasion? Never                   Rc'd consult for cardiac rehab. Choice obtained for [facility] out pt referral sent. Pt lives home with  plans to return when medically stable. Will follow dc needs as arise.

## 2022-12-06 NOTE — ASSESSMENT & PLAN NOTE
initial troponin 3261; subsequent troponin 3,421.   Plan is for [facility] today  ALICE score: 4 - 20% risk at 14 days of: all-cause mortality, new or recurrent MI, or severe recurrent ischemia requiring urgent revascularization  morphine PRN, nitro paste, oxygen supplementation PRN  ASA 81 mg daily  Ttoprol XL 12.5 mg qd  Echo 12/3/22 - LV nml in size with normal systolic and LV diastolic function. EF 55%. Normal RV size, Moderate MR and TR. There are segmental left ventricular wall motion abnormalities.

## 2022-12-06 NOTE — SUBJECTIVE & OBJECTIVE
History reviewed. No pertinent past medical history.    History reviewed. No pertinent surgical history.    Review of patient's allergies indicates:   Allergen Reactions    Penicillin Hives       No current facility-administered medications on file prior to encounter.     No current outpatient medications on file prior to encounter.     Family History       Problem Relation (Age of Onset)    Heart attack Father, Brother, Maternal Grandfather    Heart disease Paternal Grandfather          Tobacco Use    Smoking status: Every Day     Types: Cigarettes    Smokeless tobacco: Not on file   Substance and Sexual Activity    Alcohol use: Never    Drug use: Yes     Types: Marijuana    Sexual activity: Yes     Review of Systems   Constitutional:  Positive for diaphoresis. Negative for chills and fever.   HENT:  Negative for congestion, hearing loss and trouble swallowing.    Eyes:  Negative for visual disturbance.   Respiratory:  Positive for chest tightness. Negative for cough and shortness of breath.    Cardiovascular:  Positive for chest pain. Negative for palpitations.   Gastrointestinal:  Negative for abdominal pain, blood in stool, diarrhea, nausea and vomiting.   Genitourinary:  Negative for difficulty urinating and hematuria.   Musculoskeletal:  Negative for back pain and myalgias.   Skin:  Negative for rash.   Neurological:  Positive for syncope (intermittent since she was 8 years old). Negative for dizziness, light-headedness and headaches.   Psychiatric/Behavioral:  Negative for sleep disturbance. The patient is not nervous/anxious.    Objective:     Vital Signs (Most Recent):  Temp: 98.1 °F (36.7 °C) (12/05/22 1409)  Pulse: 85 (12/05/22 1744)  Resp: (!) 22 (12/05/22 1419)  BP: 118/85 (12/05/22 1744)  SpO2: 97 % (12/05/22 1719)   Vital Signs (24h Range):  Temp:  [98.1 °F (36.7 °C)] 98.1 °F (36.7 °C)  Pulse:  [85-96] 85  Resp:  [18-22] 22  SpO2:  [96 %-99 %] 97 %  BP: (101-123)/(77-87) 118/85     Weight: 61.9 kg  (136 lb 8 oz)  Body mass index is 24.18 kg/m².    Physical Exam  Vitals reviewed.   Constitutional:       General: She is not in acute distress.     Appearance: Normal appearance. She is normal weight. She is not ill-appearing, toxic-appearing or diaphoretic.   HENT:      Head: Normocephalic and atraumatic.      Right Ear: External ear normal.      Left Ear: External ear normal.      Nose: Nose normal.      Mouth/Throat:      Mouth: Mucous membranes are moist.      Pharynx: Oropharynx is clear.   Eyes:      General: No scleral icterus.     Extraocular Movements: Extraocular movements intact.      Conjunctiva/sclera: Conjunctivae normal.      Pupils: Pupils are equal, round, and reactive to light.   Cardiovascular:      Rate and Rhythm: Normal rate and regular rhythm.      Pulses: Normal pulses.      Heart sounds: Normal heart sounds. No murmur heard.  Pulmonary:      Effort: Pulmonary effort is normal. No respiratory distress.      Breath sounds: Normal breath sounds. No wheezing, rhonchi or rales.   Abdominal:      General: Abdomen is flat. Bowel sounds are normal. There is no distension.      Palpations: Abdomen is soft.      Tenderness: There is no abdominal tenderness. There is no guarding or rebound.   Musculoskeletal:         General: No swelling. Normal range of motion.      Cervical back: Normal range of motion and neck supple. No rigidity.   Skin:     General: Skin is warm and dry.      Capillary Refill: Capillary refill takes less than 2 seconds.      Findings: No rash.   Neurological:      General: No focal deficit present.      Mental Status: She is alert and oriented to person, place, and time. Mental status is at baseline.   Psychiatric:         Mood and Affect: Mood normal.         Behavior: Behavior normal.         Thought Content: Thought content normal.         Judgment: Judgment normal.         CRANIAL NERVES     CN III, IV, VI   Pupils are equal, round, and reactive to light.     Significant  Labs: All pertinent labs within the past 24 hours have been reviewed.  Recent Lab Results         12/05/22  1626   12/05/22  1605   12/05/22  1447        Albumin/Globulin Ratio     0.9       Albumin     3.4       Alkaline Phosphatase     80       ALT     28       Anion Gap     14       aPTT 25.4   24.4         AST     39       Baso #   0.06   0.10       Basophil %   0.6   1.2       BILIRUBIN TOTAL     0.3       BUN     14       BUN/CREAT RATIO     18       Calcium     9.1       Chloride     104       CO2     24       Creatinine     0.80       Differential Type   Auto   Auto       eGFR     88       Eos #   0.18   0.16       Eosinophil %   1.9   1.9       Globulin, Total     3.7       Glucose     102       Hematocrit   42.3   42.9       Hemoglobin   13.7   13.9       Immature Grans (Abs)   0.03   0.02       Immature Granulocytes   0.3   0.2       INR 1.01   1.02         Lymph #   2.43   2.21       Lymph %   26.3   26.2       MCH   28.5   28.5       MCHC   32.4   32.4       MCV   88.1   87.9       Mono #   0.51   0.49       Mono %   5.5   5.8       MPV   9.1   9.6       Neutrophils, Abs   6.04   5.44       Neutrophils Relative   65.4   64.7       nRBC   0.0   0.0       NT-proBNP     3,306       NUCLEATED RBC ABSOLUTE   0.00   0.00       Platelets   310   334       Potassium     4.3       PROTEIN TOTAL     7.1       Protime 12.9   13.0         RBC   4.80   4.88       RDW   12.8   13.0       Sodium     138       Troponin I High Sensitivity     3,261.6       WBC   9.25   8.42               Significant Imaging: I have reviewed all pertinent imaging results/findings within the past 24 hours.

## 2022-12-06 NOTE — ASSESSMENT & PLAN NOTE
-initial troponin 3261. EKG NSR with nonspecific ST changes. Repeat troponin and EKG pending.   -Cardiology consulted. Dr. Erazo's assistance appreciated. Plan for [facility] tomorrow. NPO in preparation.  -ALICE score: 4 - 20% risk at 14 days of: all-cause mortality, new or recurrent MI, or severe recurrent ischemia requiring urgent revascularization.  -Lipid panel, high dose statin  -morphine PRN, nitro paste, oxygen supplementation PRN  -  Mg in ED then 81 mg daily  -toprol XL 12.5 mg qd  - Echo 12/3/22 - LV nml in size with normal systolic and LV diastolic function. EF 55%. Normal RV size, Moderate MR and TR. There are segmental left ventricular wall motion abnormalities.

## 2022-12-07 VITALS
HEIGHT: 63 IN | OXYGEN SATURATION: 97 % | BODY MASS INDEX: 23.99 KG/M2 | HEART RATE: 75 BPM | TEMPERATURE: 97 F | DIASTOLIC BLOOD PRESSURE: 80 MMHG | RESPIRATION RATE: 18 BRPM | WEIGHT: 135.38 LBS | SYSTOLIC BLOOD PRESSURE: 142 MMHG

## 2022-12-07 LAB
POC ACTIVATED CLOTTING TIME K: 293 SEC
POC ACTIVATED CLOTTING TIME K: 327 SEC

## 2022-12-07 PROCEDURE — 99217 PR OBSERVATION CARE DISCHARGE: CPT | Mod: GC,,, | Performed by: FAMILY MEDICINE

## 2022-12-07 PROCEDURE — 25000003 PHARM REV CODE 250: Performed by: REGISTERED NURSE

## 2022-12-07 PROCEDURE — 36415 COLL VENOUS BLD VENIPUNCTURE: CPT | Performed by: FAMILY MEDICINE

## 2022-12-07 PROCEDURE — 25000003 PHARM REV CODE 250: Performed by: STUDENT IN AN ORGANIZED HEALTH CARE EDUCATION/TRAINING PROGRAM

## 2022-12-07 PROCEDURE — 99217 PR OBSERVATION CARE DISCHARGE: ICD-10-PCS | Mod: GC,,, | Performed by: FAMILY MEDICINE

## 2022-12-07 PROCEDURE — 25000003 PHARM REV CODE 250: Performed by: HOSPITALIST

## 2022-12-07 PROCEDURE — G0378 HOSPITAL OBSERVATION PER HR: HCPCS

## 2022-12-07 RX ORDER — METOPROLOL SUCCINATE 25 MG/1
12.5 TABLET, EXTENDED RELEASE ORAL DAILY
Qty: 15 TABLET | Refills: 11 | Status: SHIPPED | OUTPATIENT
Start: 2022-12-08 | End: 2022-12-08 | Stop reason: SDUPTHER

## 2022-12-07 RX ORDER — ATORVASTATIN CALCIUM 80 MG/1
80 TABLET, FILM COATED ORAL NIGHTLY
Qty: 90 TABLET | Refills: 3 | Status: SHIPPED | OUTPATIENT
Start: 2022-12-07 | End: 2023-03-30 | Stop reason: SDUPTHER

## 2022-12-07 RX ORDER — ASPIRIN 81 MG/1
81 TABLET ORAL DAILY
Qty: 90 TABLET | Refills: 3 | Status: SHIPPED | OUTPATIENT
Start: 2022-12-08 | End: 2022-12-08 | Stop reason: SDUPTHER

## 2022-12-07 RX ADMIN — TICAGRELOR 90 MG: 90 TABLET ORAL at 09:12

## 2022-12-07 RX ADMIN — ACETAMINOPHEN 650 MG: 325 TABLET ORAL at 03:12

## 2022-12-07 RX ADMIN — ASPIRIN 81 MG: 81 TABLET, DELAYED RELEASE ORAL at 09:12

## 2022-12-07 RX ADMIN — METOPROLOL SUCCINATE 12.5 MG: 25 TABLET, EXTENDED RELEASE ORAL at 09:12

## 2022-12-07 NOTE — SUBJECTIVE & OBJECTIVE
Interval History: No reported chest pain or sob overnight.    Review of Systems   Constitutional: Negative for fever.   Cardiovascular:  Negative for chest pain, leg swelling, near-syncope and palpitations.   Respiratory:  Negative for shortness of breath.    Gastrointestinal:  Negative for abdominal pain, nausea and vomiting.   All other systems reviewed and are negative.  Objective:     Vital Signs (Most Recent):  Temp: 97 °F (36.1 °C) (12/07/22 0715)  Pulse: 75 (12/07/22 0715)  Resp: 18 (12/07/22 0715)  BP: (!) 142/80 (12/07/22 0715)  SpO2: 97 % (12/07/22 0715)   Vital Signs (24h Range):  Temp:  [97 °F (36.1 °C)-98 °F (36.7 °C)] 97 °F (36.1 °C)  Pulse:  [71-86] 75  Resp:  [18-20] 18  SpO2:  [92 %-98 %] 97 %  BP: (109-142)/(61-84) 142/80     Weight: 61.4 kg (135 lb 5.8 oz)  Body mass index is 23.98 kg/m².     SpO2: 97 %  O2 Device (Oxygen Therapy): room air    No intake or output data in the 24 hours ending 12/07/22 1144    Lines/Drains/Airways       Peripheral Intravenous Line  Duration                  Peripheral IV - Single Lumen 12/05/22 1436 20 G Posterior;Right Hand 1 day                    Physical Exam  Vitals reviewed.   Constitutional:       General: She is not in acute distress.     Appearance: Normal appearance.   HENT:      Head: Normocephalic and atraumatic.      Mouth/Throat:      Mouth: Mucous membranes are moist.   Eyes:      Extraocular Movements: Extraocular movements intact.      Pupils: Pupils are equal, round, and reactive to light.   Cardiovascular:      Rate and Rhythm: Normal rate and regular rhythm.      Pulses: Normal pulses.      Heart sounds: Normal heart sounds. No murmur heard.  Pulmonary:      Effort: Pulmonary effort is normal.      Breath sounds: Normal breath sounds.   Abdominal:      General: Bowel sounds are normal. There is no distension.      Palpations: Abdomen is soft.   Musculoskeletal:         General: Normal range of motion.      Cervical back: Normal range of motion and  neck supple.   Skin:     General: Skin is warm and dry.      Capillary Refill: Capillary refill takes less than 2 seconds.   Neurological:      General: No focal deficit present.      Mental Status: She is alert and oriented to person, place, and time.      Cranial Nerves: No cranial nerve deficit.      Sensory: No sensory deficit.   Psychiatric:         Mood and Affect: Mood normal.         Behavior: Behavior normal.       Significant Labs: All pertinent lab results from the last 24 hours have been reviewed.    Significant Imaging: Cardiac Cath:

## 2022-12-07 NOTE — NURSING
Dc instructions given with pt and  verb understanding. Teaching on not stopping brillinta without checking with cardiologist before hand.

## 2022-12-07 NOTE — PROGRESS NOTES
[facility] - Short Stay Unit  Cardiology  Progress Note    Patient Name: Johana Higgins  MRN: [id number]  Admission Date: 12/5/2022  Hospital Length of Stay: 0 days  Code Status: Full Code   Attending Physician: Sesar Cordero Jr., MD   Primary Care Physician: Primary Doctor No  Expected Discharge Date:   Principal Problem:ACS (acute coronary syndrome)    Subjective:     Hospital Course:   No notes on file    Interval History: No reported chest pain or sob overnight.    Review of Systems   Constitutional: Negative for fever.   Cardiovascular:  Negative for chest pain, leg swelling, near-syncope and palpitations.   Respiratory:  Negative for shortness of breath.    Gastrointestinal:  Negative for abdominal pain, nausea and vomiting.   All other systems reviewed and are negative.  Objective:     Vital Signs (Most Recent):  Temp: 97 °F (36.1 °C) (12/07/22 0715)  Pulse: 75 (12/07/22 0715)  Resp: 18 (12/07/22 0715)  BP: (!) 142/80 (12/07/22 0715)  SpO2: 97 % (12/07/22 0715)   Vital Signs (24h Range):  Temp:  [97 °F (36.1 °C)-98 °F (36.7 °C)] 97 °F (36.1 °C)  Pulse:  [71-86] 75  Resp:  [18-20] 18  SpO2:  [92 %-98 %] 97 %  BP: (109-142)/(61-84) 142/80     Weight: 61.4 kg (135 lb 5.8 oz)  Body mass index is 23.98 kg/m².     SpO2: 97 %  O2 Device (Oxygen Therapy): room air    No intake or output data in the 24 hours ending 12/07/22 1144    Lines/Drains/Airways       Peripheral Intravenous Line  Duration                  Peripheral IV - Single Lumen 12/05/22 1436 20 G Posterior;Right Hand 1 day                    Physical Exam  Vitals reviewed.   Constitutional:       General: She is not in acute distress.     Appearance: Normal appearance.   HENT:      Head: Normocephalic and atraumatic.      Mouth/Throat:      Mouth: Mucous membranes are moist.   Eyes:      Extraocular Movements: Extraocular movements intact.      Pupils: Pupils are equal, round, and reactive to light.   Cardiovascular:      Rate and  Rhythm: Normal rate and regular rhythm.      Pulses: Normal pulses.      Heart sounds: Normal heart sounds. No murmur heard.  Pulmonary:      Effort: Pulmonary effort is normal.      Breath sounds: Normal breath sounds.   Abdominal:      General: Bowel sounds are normal. There is no distension.      Palpations: Abdomen is soft.   Musculoskeletal:         General: Normal range of motion.      Cervical back: Normal range of motion and neck supple.   Skin:     General: Skin is warm and dry.      Capillary Refill: Capillary refill takes less than 2 seconds.   Neurological:      General: No focal deficit present.      Mental Status: She is alert and oriented to person, place, and time.      Cranial Nerves: No cranial nerve deficit.      Sensory: No sensory deficit.   Psychiatric:         Mood and Affect: Mood normal.         Behavior: Behavior normal.       Significant Labs: All pertinent lab results from the last 24 hours have been reviewed.    Significant Imaging: Cardiac Cath:      Assessment and Plan:     Brief HPI: 53 y/o female seen in consult today for chest pain with elevated troponin levels. The patient was seen at [facility] 12/3/22 with similar symptoms and elevated troponin; leaving AMA before [facility]. She reports chest started Thursday 12/1/22 while at rest, self resolving. Later that night after work her symptoms returned. Today she begin having left side pain under her arm that radiated to her chest and into left jaw. Pain is described as tightness/pressure type pain, rated 7/10 on pain scale at worst. Denies alleviating or aggravating factors. No reported palpitations, n/v, SOB, diaphoresis, abdominal pain, or recent illness. History of smoking for 40 years and Marijuana use.     ED workup: Troponin-->3000, Ekg - SR with non specific inferior lateral ST-T wave abnormalities       Past medical hx: Tobacco dependence, HLD    * NSTE-ACS  Patient seen and evaluated by  Dr.Khan Gonzalez score: ALICE score: 4 - 20%  risk at 14 days of: all-cause mortality, new or recurrent MI, or severe recurrent ischemia requiring urgent revascularization.  Troponin --> 3306  Ekg: SR with non specific inferior lateral ST-T wave abnormalities    Lipid panel   Lopressor 12. 5 mg bid   Lipitor 80 mg qhs    mg in ED  Plan for LHC; Procedure, risk and benefits discussed in detail with patient and family, they verbalized understanding and wishes to proceed. Consent obtained and on chart.   Further recommendations to follow   - Echo 12/3/22   The left ventricle is normal in size with normal systolic function.   The estimated ejection fraction is 55%.   Normal left ventricular diastolic function.   Normal right ventricular size.   Moderate mitral regurgitation.   Moderate tricuspid regurgitation.   There are segmental left ventricular wall motion abnormalities.    12/7/22  Discharge on DAPT (Brilinta, ASA 81 mg), Toprol xl, Lipitor  SS consulted for cardiac rehab  Follow up with NIKHIL Moctezuma 2 weeks and Sr. Barakat 6 weeks  [facility]    The Mid LAD lesion was 50% stenosed.    The 1st Mrg lesion was 99% stenosed with 0% stenosis post-intervention.    The left ventricular end diastolic pressure was normal.    The pre-procedure left ventricular end diastolic pressure was 6.    There was single vessel coronary artery disease.     1. Single vessel CAD ( 99% thrombotic occlusion of first OM branch - culprit)  2. Normal left sided filling pressures - LVEDP 6mmHg  3. S/P successful PCI of first OM branch with two overlapping 2.75 x 18mm and 2.75 x 12mm Synergy JANENE.    Plan:  1. DAPT (aspirin and brillinta) - 1 year  2. Statin therapy  3. Risk factor modification and life-style changes         HLD (hyperlipidemia)  Lipitor 80 mg qhs  Lab Results   Component Value Date    CHOL 234 (H) 12/03/2022     Lab Results   Component Value Date    HDL 50 12/03/2022     Lab Results   Component Value Date    LDLCALC 131 12/03/2022     Lab Results    Component Value Date    TRIG 264 (H) 12/03/2022     Lab Results   Component Value Date    CHOLHDL 4.7 12/03/2022       Tobacco dependence  Smoking cessation discussed. Education provided   Nicotine patch 21 mg        VTE Risk Mitigation (From admission, onward)         Ordered     IP VTE HIGH RISK PATIENT  Once         12/05/22 1821     Place sequential compression device  Until discontinued         12/05/22 1821                Narinder Gacria, NIKHIL  Cardiology  [facility] - Short Stay Unit

## 2022-12-07 NOTE — NURSING
Patient refused nitro at this time, educated patient, pt will reconsider at 0600 dose   feet/swelling of lower extremities

## 2022-12-07 NOTE — DISCHARGE INSTRUCTIONS
CARE OF YOUR PUNCTURE SITE  *You or someone else, if you are unable, must inspect the site daily.  *DO NOT sit in a bathtub or pool of water for 5 days or until wound has healed.  *You may shower 24 hours after the procedure. Always use a clean washcloth to care for your incision and the area around it and a second washcloth for your body. Remove the bandaid before showering. Gently clean site using soap and water while standing in the shower. Dry thoroughly.  *After your shower, apply an antibacterial ointment to the site and cover with a bandaid the first day after your cath.  If you choose not to shower this day, you will still need to clean and redress your cath site.  *If you start bleeding at the site lay down while either you or someone else holds pressure over the site for 10 minutes without letting up. Seek help immediately if it does not stop bleeding.                                                                                                          ACTIVITY  *Fatigue is common for 1-2 days.  *You may resume normal activity in 2-3 days, letting pain be your guide.  *Limit lifting over 10 pounds for one week or until wound heals.   *Over the next few days, if you have to cough, laugh, or sneeze, hold pressure on the site with your hand while doing so.                                                                                          NORMAL OBSERVATIONS  *Soreness or tenderness that may last one week.  *Mild oozing from the site.  *Possible bruising that could last 2 weeks.  *Formation of a small lump (dime to quarter size) which may last up to 6 weeks.        CALL THE DOCTOR IMMEDIATELY OR GO TO THE EMERGENCY DEPARTMENT IF YOU EXPERIENCE ANY OF THE FOLLOWING  *Significant bleeding that does not stop after 10 minutes of applying firm pressure.  *Increased swelling at access site or extremity.  *Unusual pain at  access site:extremity or back pain.  *Signs of infection:redness, warmth to touch, drainage other than a little blood or pink tinged drainage on the bandaid, poorly healing puncture site, fever, or chills.       Do not lift heavier than a gallon of milk for 3 days, until Saturday.

## 2022-12-07 NOTE — HOSPITAL COURSE
52 y.o. female with no reported PMHx reports she was sent to the ED by Dr. Cotter (cardiology) two days ago c/o left-sided chest pain described as pressure which radiates to left jaw and across the chest. Pt reports similar symptoms previously on 12/1/22 of chest pain with hospitalization and a plan for a cath by cardiology. However, patient reports she left against medical advice 2 days prior to admission. Pt reports she would like to get a cath and further care on this admission. Troponin trend from 2 days prior to admission was as follows: 240 -> 480 -> 2747. She reports some associated nausea, diaphoresis, dyspnea, orthopnea but denies PND. Pt reports fainting spells with loss of urine control since she was 8 years old but states she does not want medical evaluation for it. She denies HA, blurry vision, dysphagia, diarrhea, or any other complaints at this time.     Pt reports no known medical problems but states a strong family history of heart disease and 50 pack year smoking history. Pt states she takes no daily medications except for her 's lasix whenever she gets LE swelling, OTOOLE, or orthopnea. Echo from 12/3/22 shows EF 55 with normal systolic and diastolic function and segmental left ventricular wall motion abnormalities. Pt has been a 1 ppd smoker since the age of 12. She denies EtOH consumption. She reports daily smoking of marijuana. Pt lives at home with her . Patient is very independent and use to carrying out all MRADLs with no assistance.     Workup thus far has been significant for the following: Initial troponin 3261 which is increased from 2747 on discharge 2 days ago.  EKG reveals normal sinus rhythm with nonspecific ST changes.  CXR reveals no acute cardiopulmonary process. Repeat troponin and EKG pending. CBC and CMP are grossly WNL. Dr. Erazo (cardiology evaluated the patient in the ED and patient will receive a heart cath.     Pt as admitted to [facility] [place] [place] under the supervision of  Dr. Cordero for further evaluation and management of NSTE-ACS with consultation from cardiology.       Pt underwent [facility] with PCI with two overlapping JANENE to the LAD. Pt tolerated the procedure well. Today, on the day of her discharge, she was determined to have achieved the maximum benefit of her inpatient stay. Pt is safe and stable for d/c with outpt cardiology followup. Pt counseled at length on the need to discontinue smoking.

## 2022-12-07 NOTE — PLAN OF CARE
SS received duplicate consult for cardiac rehab. Choice was obtained for [facility] cardiac rehab and referral was faxed yesterday. SS spoke with Justine at [facility] and she has received cardiac rehab referral. Cath report faxed at this time. SS following.

## 2022-12-07 NOTE — ASSESSMENT & PLAN NOTE
Patient seen and evaluated by    Alice score: ALICE score: 4 - 20% risk at 14 days of: all-cause mortality, new or recurrent MI, or severe recurrent ischemia requiring urgent revascularization.  Troponin --> 3306  Ekg: SR with non specific inferior lateral ST-T wave abnormalities    Lipid panel   Lopressor 12. 5 mg bid   Lipitor 80 mg qhs    mg in ED  Plan for LH; Procedure, risk and benefits discussed in detail with patient and family, they verbalized understanding and wishes to proceed. Consent obtained and on chart.   Further recommendations to follow   - Echo 12/3/22   The left ventricle is normal in size with normal systolic function.   The estimated ejection fraction is 55%.   Normal left ventricular diastolic function.   Normal right ventricular size.   Moderate mitral regurgitation.   Moderate tricuspid regurgitation.   There are segmental left ventricular wall motion abnormalities.    12/7/22  Discharge on DAPT (Brilinta, ASA 81 mg), Toprol xl, Lipitor  SS consulted for cardiac rehab  Follow up with NIKHIL Moctezuma 2 weeks and Sr. Barakat 6 weeks  [facility]    The Mid LAD lesion was 50% stenosed.    The 1st Mrg lesion was 99% stenosed with 0% stenosis post-intervention.    The left ventricular end diastolic pressure was normal.    The pre-procedure left ventricular end diastolic pressure was 6.    There was single vessel coronary artery disease.     1. Single vessel CAD ( 99% thrombotic occlusion of first OM branch - culprit)  2. Normal left sided filling pressures - LVEDP 6mmHg  3. S/P successful PCI of first OM branch with two overlapping 2.75 x 18mm and 2.75 x 12mm Synergy JANENE.    Plan:  1. DAPT (aspirin and brillinta) - 1 year  2. Statin therapy  3. Risk factor modification and life-style changes

## 2022-12-07 NOTE — DISCHARGE SUMMARY
[facility] Medicine  Discharge Summary      Patient Name: Johana Higgins  MRN: [id number]  FLAQUITO: [id number]  Patient Class: OP- Observation  Admission Date: 12/5/2022  Hospital Length of Stay: 0 days  Discharge Date and Time:  12/07/2022 3:18 PM  Attending Physician: Missy att. providers found   Discharging Provider: David Uriatre DO  Primary Care Provider: Primary Doctor Missy    Primary Care Team: Networked reference to record PCT     HPI:   52 y.o. female with no reported PMHx reports she was sent to the ED by Dr. Cotter (cardiology) today for c/o left-sided chest pain described as pressure which radiates to left jaw and scross the chest. Pt reports similar symptoms previously on 12/1/22 of chest pain with hospitalization and a plan for a cath by cardiology. However, patient reports she left against medical advice 2 days ago. Pt reports she would like to get a cath and further care on this admission. Troponin trend from 2 days ago 240 -> 480 -> 2747. She reports some associated nausea, diaphoresis, dyspnea, orthopnea but denies PND. Pt reports fainting spells with loss of urine control since she was 8 years old but states she does not want medical evaluation for it. She denies HA, blurry vision, dysphagia, diarrhea, or any other complaints at this time.     Pt reports no known medical problems but states a strong family history of heart disease and 50 pack year smoking history. Pt states she takes no daily medications except for her 's lasix whenever she gets LE swelling, OTOOLE, or orthopnea. Echo from 12/3/22 shows EF 55 with normal systolic and diastolic function and segmental left ventricular wall motion abnormalities. Pt has been a 1 ppd smoker since the age of 12. She denies EtOH consumption. She reports daily smoking of marijuana. Pt lives at home with her . Patient is very independent and use to carrying out all MRADLs with no assistance.     Workup  thus far has been significant for the following: Initial troponin 3261 which is increased from 2747 on discharge 2 days ago.  EKG reveals normal sinus rhythm with nonspecific ST changes.  CXR reveals no acute cardiopulmonary process. Repeat troponin and EKG pending. CBC and CMP are grossly WNL. Dr. Erazo (cardiology evaluated the patient in the ED and patient will receive a heart cath.     Pt will be admitted to [facility] [place] the supervision of Dr. Cordero for further evaluation and management of NSTE-ACS with consultation from cardiology.       Procedure(s) (LRB):  Angiogram, Coronary, with Left Heart Cath (N/A)  Percutaneous coronary intervention (N/A)      Hospital Course:   52 y.o. female with no reported PMHx reports she was sent to the ED by Dr. Cotter (cardiology) two days ago c/o left-sided chest pain described as pressure which radiates to left jaw and across the chest. Pt reports similar symptoms previously on 12/1/22 of chest pain with hospitalization and a plan for a cath by cardiology. However, patient reports she left against medical advice 2 days prior to admission. Pt reports she would like to get a cath and further care on this admission. Troponin trend from 2 days prior to admission was as follows: 240 -> 480 -> 2747. She reports some associated nausea, diaphoresis, dyspnea, orthopnea but denies PND. Pt reports fainting spells with loss of urine control since she was 8 years old but states she does not want medical evaluation for it. She denies HA, blurry vision, dysphagia, diarrhea, or any other complaints at this time.     Pt reports no known medical problems but states a strong family history of heart disease and 50 pack year smoking history. Pt states she takes no daily medications except for her 's lasix whenever she gets LE swelling, OTOOLE, or orthopnea. Echo from 12/3/22 shows EF 55 with normal systolic and diastolic function and segmental left ventricular wall motion abnormalities. Pt  has been a 1 ppd smoker since the age of 12. She denies EtOH consumption. She reports daily smoking of marijuana. Pt lives at home with her . Patient is very independent and use to carrying out all MRADLs with no assistance.     Workup thus far has been significant for the following: Initial troponin 3261 which is increased from 2747 on discharge 2 days ago.  EKG reveals normal sinus rhythm with nonspecific ST changes.  CXR reveals no acute cardiopulmonary process. Repeat troponin and EKG pending. CBC and CMP are grossly WNL. Dr. Erazo (cardiology evaluated the patient in the ED and patient will receive a heart cath.     [place] [facility] [place] Dr. Cordero for further evaluation and management of NSTE-ACS with consultation from cardiology.       Pt underwent LHC with PCI with two overlapping JANENE to the LAD. Pt tolerated the procedure well. Today, on the day of her discharge, she was determined to have achieved the maximum benefit of her inpatient stay. Pt is safe and stable for d/c with outpt cardiology followup. Pt counseled at length on the need to discontinue smoking.        Goals of Care Treatment Preferences:  Code Status: Full Code      Consults:   Consults (From admission, onward)        Status Ordering Provider     Inpatient consult to Social Work  Once        Provider:  (Not yet assigned)    Completed DANIELA GUIDO     Inpatient consult to Social Work  Once        Provider:  (Not yet assigned)    Completed RUTHANN ESPINOSA     Inpatient consult to Cardiology  Once        Provider:  Keisha Erazo MD    Completed GAGE CAROLINA          No new Assessment & Plan notes have been filed under this hospital service since the last note was generated.  Service: Hospital Medicine    Final Active Diagnoses:    Diagnosis Date Noted POA    PRINCIPAL PROBLEM:  NSTE-ACS [I24.9] 12/03/2022 Yes    HLD (hyperlipidemia) [E78.5] 12/05/2022 Yes     Chronic    Tobacco dependence [F17.200]  12/03/2022 Yes     Chronic      Problems Resolved During this Admission:       Discharged Condition: stable    Disposition: Home or Self Care    Follow Up:   Follow-up Information     DIVYA Ugarte Follow up in 2 week(s).    Specialties: Family Medicine, Cardiology  Why: January 3 at 330pm on tuesday  Contact information:  [address] [place] [address] Professional Building  [place] [address]  [phone]             Primary Doctor No Follow up in 1 week(s).    Why: Dr Caban in [facility] December 14 at 1030                     Patient Instructions:      Ambulatory referral/consult to Cardiac Rehab   Standing Status: Future   Referral Priority: Routine Referral Type: Consultation   Referral Reason: Specialty Services Required   Requested Specialty: Cardiac Rehabilitation   Number of Visits Requested: 1     Ambulatory referral/consult to Cardiology   Standing Status: Future   Referral Priority: Routine Referral Type: Consultation   Referral Reason: Specialty Services Required   Requested Specialty: Cardiology   Number of Visits Requested: 1     Diet Cardiac       Significant Diagnostic Studies: Labs:   BMP:   Recent Labs   Lab 12/06/22  0459   *      K 4.0      CO2 26   BUN 15   CREATININE 1.05*   CALCIUM 9.0       Pending Diagnostic Studies:     Procedure Component Value Units Date/Time    EKG 12-lead [[id number]] Collected: 12/05/22 1416    Order Status: Sent Lab Status: In process Updated: 12/05/22 1454    Narrative:      Test Reason : R07.9,    Vent. Rate : 090 BPM     Atrial Rate : 000 BPM     P-R Int : 118 ms          QRS Dur : 076 ms      QT Int : 340 ms       P-R-T Axes : 074 075 034 degrees     QTc Int : 393 ms    Sinus rhythm  Poor R wave progression  Inferior/lateral ST-T abnormality  is nonspecific  Low QRS voltages in precordial leads  Borderline ECG      Referred By: [email or web address]   SELF           Confirmed By:     EXTRA TUBES [[id number]] Collected: 12/05/22 2117    Order  Status: Sent Lab Status: In process Updated: 12/05/22 2117    Specimen: Blood, Venous     Narrative:      The following orders were created for panel order EXTRA TUBES.  Procedure                               Abnormality         Status                     ---------                               -----------         ------                     Lavender Top Hold[[id number]]                                In process                   Please view results for these tests on the individual orders.         Medications:  Reconciled Home Medications:      Medication List      START taking these medications    aspirin 81 MG EC tablet  Commonly known as: ECOTRIN  Take 1 tablet (81 mg total) by mouth once daily.  Start taking on: December 8, 2022     atorvastatin 80 MG tablet  Commonly known as: LIPITOR  Take 1 tablet (80 mg total) by mouth every evening.     metoprolol succinate 25 MG 24 hr tablet  Commonly known as: TOPROL-XL  Take 0.5 tablets (12.5 mg total) by mouth once daily.  Start taking on: December 8, 2022     ticagrelor 90 mg tablet  Commonly known as: BRILINTA  Take 1 tablet (90 mg total) by mouth 2 (two) times daily.            Indwelling Lines/Drains at time of discharge:   Lines/Drains/Airways     None                 Time spent on the discharge of patient: 45 minutes         David Uriarte DO  Department of Hospital Medicine  [facility]

## 2022-12-08 RX ORDER — METOPROLOL SUCCINATE 25 MG/1
12.5 TABLET, EXTENDED RELEASE ORAL DAILY
Qty: 15 TABLET | Refills: 11 | Status: SHIPPED | OUTPATIENT
Start: 2022-12-08 | End: 2023-03-30 | Stop reason: SDUPTHER

## 2022-12-08 RX ORDER — ASPIRIN 81 MG/1
81 TABLET ORAL DAILY
Qty: 90 TABLET | Refills: 3 | Status: SHIPPED | OUTPATIENT
Start: 2022-12-08 | End: 2023-11-26 | Stop reason: SDUPTHER

## 2022-12-15 ENCOUNTER — OFFICE VISIT (OUTPATIENT)
Dept: FAMILY MEDICINE | Facility: CLINIC | Age: 52
End: 2022-12-15
Payer: COMMERCIAL

## 2022-12-15 VITALS
HEART RATE: 85 BPM | BODY MASS INDEX: 24.27 KG/M2 | WEIGHT: 137 LBS | DIASTOLIC BLOOD PRESSURE: 80 MMHG | HEIGHT: 63 IN | SYSTOLIC BLOOD PRESSURE: 124 MMHG | OXYGEN SATURATION: 97 %

## 2022-12-15 DIAGNOSIS — Z12.11 ENCOUNTER FOR COLORECTAL CANCER SCREENING: ICD-10-CM

## 2022-12-15 DIAGNOSIS — R56.9 SEIZURES: ICD-10-CM

## 2022-12-15 DIAGNOSIS — Z12.31 ENCOUNTER FOR SCREENING MAMMOGRAM FOR BREAST CANCER: ICD-10-CM

## 2022-12-15 DIAGNOSIS — Z12.12 ENCOUNTER FOR COLORECTAL CANCER SCREENING: ICD-10-CM

## 2022-12-15 DIAGNOSIS — I24.9 ACS (ACUTE CORONARY SYNDROME): ICD-10-CM

## 2022-12-15 DIAGNOSIS — E11.9 TYPE 2 DIABETES MELLITUS WITHOUT COMPLICATION, WITHOUT LONG-TERM CURRENT USE OF INSULIN: Primary | ICD-10-CM

## 2022-12-15 PROCEDURE — 3008F BODY MASS INDEX DOCD: CPT | Mod: CPTII,,, | Performed by: FAMILY MEDICINE

## 2022-12-15 PROCEDURE — 1159F PR MEDICATION LIST DOCUMENTED IN MEDICAL RECORD: ICD-10-PCS | Mod: CPTII,,, | Performed by: FAMILY MEDICINE

## 2022-12-15 PROCEDURE — 3044F HG A1C LEVEL LT 7.0%: CPT | Mod: CPTII,,, | Performed by: FAMILY MEDICINE

## 2022-12-15 PROCEDURE — 1159F MED LIST DOCD IN RCRD: CPT | Mod: CPTII,,, | Performed by: FAMILY MEDICINE

## 2022-12-15 PROCEDURE — 3079F PR MOST RECENT DIASTOLIC BLOOD PRESSURE 80-89 MM HG: ICD-10-PCS | Mod: CPTII,,, | Performed by: FAMILY MEDICINE

## 2022-12-15 PROCEDURE — 99213 PR OFFICE/OUTPT VISIT, EST, LEVL III, 20-29 MIN: ICD-10-PCS | Mod: ,,, | Performed by: FAMILY MEDICINE

## 2022-12-15 PROCEDURE — 3079F DIAST BP 80-89 MM HG: CPT | Mod: CPTII,,, | Performed by: FAMILY MEDICINE

## 2022-12-15 PROCEDURE — 3074F PR MOST RECENT SYSTOLIC BLOOD PRESSURE < 130 MM HG: ICD-10-PCS | Mod: CPTII,,, | Performed by: FAMILY MEDICINE

## 2022-12-15 PROCEDURE — 99213 OFFICE O/P EST LOW 20 MIN: CPT | Mod: ,,, | Performed by: FAMILY MEDICINE

## 2022-12-15 PROCEDURE — 99407 BEHAV CHNG SMOKING > 10 MIN: CPT | Mod: ,,, | Performed by: FAMILY MEDICINE

## 2022-12-15 PROCEDURE — 3074F SYST BP LT 130 MM HG: CPT | Mod: CPTII,,, | Performed by: FAMILY MEDICINE

## 2022-12-15 PROCEDURE — 3044F PR MOST RECENT HEMOGLOBIN A1C LEVEL <7.0%: ICD-10-PCS | Mod: CPTII,,, | Performed by: FAMILY MEDICINE

## 2022-12-15 PROCEDURE — 99407 PR TOBACCO USE CESSATION INTENSIVE >10 MINUTES: ICD-10-PCS | Mod: ,,, | Performed by: FAMILY MEDICINE

## 2022-12-15 PROCEDURE — 3008F PR BODY MASS INDEX (BMI) DOCUMENTED: ICD-10-PCS | Mod: CPTII,,, | Performed by: FAMILY MEDICINE

## 2022-12-15 RX ORDER — METFORMIN HYDROCHLORIDE 500 MG/1
500 TABLET ORAL 2 TIMES DAILY WITH MEALS
Qty: 180 TABLET | Refills: 1 | Status: SHIPPED | OUTPATIENT
Start: 2022-12-15 | End: 2024-01-09

## 2022-12-15 RX ORDER — CLOPIDOGREL BISULFATE 75 MG/1
75 TABLET ORAL DAILY
Qty: 30 TABLET | Refills: 11 | Status: SHIPPED | OUTPATIENT
Start: 2022-12-15 | End: 2024-01-04 | Stop reason: SDUPTHER

## 2022-12-15 RX ORDER — LEVETIRACETAM 500 MG/1
500 TABLET ORAL 2 TIMES DAILY
Qty: 180 TABLET | Refills: 1 | Status: ON HOLD | OUTPATIENT
Start: 2022-12-15 | End: 2023-11-07 | Stop reason: HOSPADM

## 2022-12-15 NOTE — PROGRESS NOTES
"              Collins Caban IV, DO  [facility]  [address] [place] [address]  Phone: [phone]      Subjective     Name: Johana Higgins   Sex: female  YOB: 1970 (52 y.o.)  MRN: [id number]  Visit Date: 12/15/2022   Chief Complaint: Transitional Care (D/c'd last Thursday- heart attack/Pt thinks been having seizures/Bloating in abdomen, trouble breathing at times) and Establish Care        HISTORY OF PRESENT ILLNESS:    Patient presents to the clinic for follow-up from hospital admission.  I have reviewed the discharge summary and medications have been reconciled.  Patient was in the hospital for acute NSTEMI she ended up having 2 drug-eluting stents placed in her LAD.  She was started on Brilinta which is impossible for most people to afford and is asking for an alternative.  I will be using a different P2Y12 receptor inhibitor, clopidogrel.  This should cause the patient maybe 27 dollars a month.  Otherwise she is to continue the aspirin for dual anti-platelet therapy.  Considering his drug-eluting stent should continue for a minimum of 6 months although the AHA recommends at least 12 months.  We will continue to monitor this treatment and will tailor the treatment to the patient.  Patient states that she is been told she has seizures.  They appear to be atypical in nature.  She will be taking Keppra 500 mg p.o. b.i.d. and we will monitor the levels of this as well as the activity of her "seizure activity".  After a very cursory review of the hospital stay it appears that they harlan an A1c that showed that the patient is in fact diabetic.  They did not start her on any diabetic regimen in the hospital so we will be initiating that at this time.  We have talked about diet and exercise options and we will be doing metformin 500 mg p.o. b.i.d. I have asked this patient to return to clinic no later than 3 months or sooner if there are any problems that arise.  As far " as patient's long-term health is concerned we did in fact schedule a mammogram today, patient does not have any family history of colorectal cancer and we will be ordering a Cologuard today for colorectal cancer screening.  Finally patient has elected to do her cervical cancer screening with the midwife here in our clinic and I have asked her to stop at the  and make an appointment for this.  Overall, patient is determined to improve her health.  She will be taking medications as directed and she is trying to quit smoking.  Counseling for this was done for more than 10 minutes but less than 15 minutes.        This document was dictated using [employer] fluency direct.  It is subject to dictation errors.    PAST MEDICAL HISTORY:  Significant Diagnoses - Patient  has a past medical history of Hyperlipidemia and Hypertension.  Medications - Patient has a current medication list which includes the following long-term medication(s): aspirin, atorvastatin, clopidogrel, levetiracetam, metformin, and metoprolol succinate.   Allergies - Patient is allergic to penicillin.  Surgeries - Patient  has a past surgical history that includes ANGIOGRAM, CORONARY, WITH LEFT HEART CATHETERIZATION (N/A, 12/6/2022).  Family History - Patient family history includes Heart attack in her brother, father, and maternal grandfather; Heart disease in her paternal grandfather.      SOCIAL HISTORY:  Tobacco - Patient  reports that she has been smoking cigarettes. She does not have any smokeless tobacco history on file.   Alcohol - Patient  reports no history of alcohol use.   Recreational Drugs - Patient  reports current drug use. Drug: Marijuana.       Review of Systems   Constitutional:  Negative for fatigue and fever.   HENT:  Negative for nasal congestion and sore throat.    Respiratory:  Negative for cough and shortness of breath.    Cardiovascular:  Negative for chest pain.   Gastrointestinal:  Negative for abdominal pain, nausea and  "vomiting.   Genitourinary:  Negative for dysuria.   Musculoskeletal:  Negative for myalgias.   Integumentary:  Negative for rash.   Neurological:  Negative for dizziness, weakness and headaches.        Past Medical History:   Diagnosis Date    Hyperlipidemia     Hypertension         Review of patient's allergies indicates:   Allergen Reactions    Penicillin Hives        Past Surgical History:   Procedure Laterality Date    ANGIOGRAM, CORONARY, WITH LEFT HEART CATHETERIZATION N/A 12/6/2022    Procedure: Angiogram, Coronary, with Left Heart Cath;  Surgeon: Dennys Barakat MD;  Location: [facility] CATH LAB;  Service: Cardiology;  Laterality: N/A;        Family History   Problem Relation Age of Onset    Heart attack Father     Heart attack Brother     Heart attack Maternal Grandfather     Heart disease Paternal Grandfather        Current Outpatient Medications   Medication Instructions    aspirin (ECOTRIN) 81 mg, Oral, Daily    atorvastatin (LIPITOR) 80 mg, Oral, Nightly    clopidogreL (PLAVIX) 75 mg, Oral, Daily    levETIRAcetam (KEPPRA) 500 mg, Oral, 2 times daily    metFORMIN (GLUCOPHAGE) 500 mg, Oral, 2 times daily with meals    metoprolol succinate (TOPROL-XL) 12.5 mg, Oral, Daily        Objective     /80   Pulse 85   Ht 5' 3" (1.6 m)   Wt 62.1 kg (137 lb)   SpO2 97%   BMI 24.27 kg/m²     Physical Exam  Constitutional:       General: She is not in acute distress.     Appearance: Normal appearance. She is not ill-appearing.   HENT:      Head: Normocephalic and atraumatic.      Right Ear: External ear normal.      Left Ear: External ear normal.   Eyes:      Extraocular Movements: Extraocular movements intact.      Conjunctiva/sclera: Conjunctivae normal.   Cardiovascular:      Rate and Rhythm: Normal rate.      Heart sounds: No murmur heard.  Pulmonary:      Effort: Pulmonary effort is normal.   Musculoskeletal:      Cervical back: Normal range of motion.   Skin:     General: Skin is warm " and dry.      Coloration: Skin is not jaundiced.      Findings: No rash.   Neurological:      Mental Status: She is alert.   Psychiatric:         Mood and Affect: Mood normal.        All recently obtained labs have been reviewed and discussed in detail with the patient.   Assessment     1. Type 2 diabetes mellitus without complication, without long-term current use of insulin    2. Encounter for colorectal cancer screening    3. Encounter for screening mammogram for breast cancer    4. Seizures    5. ACS (acute coronary syndrome)         Plan        Problem List Items Addressed This Visit          Cardiac/Vascular    NSTE-ACS    Relevant Medications    clopidogreL (PLAVIX) 75 mg tablet     Other Visit Diagnoses       Type 2 diabetes mellitus without complication, without long-term current use of insulin    -  Primary    Relevant Medications    metFORMIN (GLUCOPHAGE) 500 MG tablet    Encounter for colorectal cancer screening        Relevant Orders    Cologuard Screening (Multitarget Stool DNA)    Encounter for screening mammogram for breast cancer        Relevant Orders    Mammo Digital Screening Bilat    Seizures        Relevant Medications    levETIRAcetam (KEPPRA) 500 MG Tab            No follow-ups on file.    Patient advised that is symptoms worsen, they should call or report directly to local emergency department.    Collins Caban,   [facility]

## 2022-12-15 NOTE — PATIENT INSTRUCTIONS
Tests to Keep You Healthy    Mammogram: ORDERED BUT NOT SCHEDULED  Colon Cancer Screening: ORDERED  Cervical Cancer Screening: DUE

## 2023-01-03 DIAGNOSIS — I24.9 ACS (ACUTE CORONARY SYNDROME): Primary | ICD-10-CM

## 2023-03-30 ENCOUNTER — OFFICE VISIT (OUTPATIENT)
Dept: CARDIOLOGY | Facility: CLINIC | Age: 53
End: 2023-03-30
Payer: COMMERCIAL

## 2023-03-30 VITALS
RESPIRATION RATE: 18 BRPM | BODY MASS INDEX: 24.27 KG/M2 | HEIGHT: 63 IN | WEIGHT: 137 LBS | SYSTOLIC BLOOD PRESSURE: 115 MMHG | DIASTOLIC BLOOD PRESSURE: 60 MMHG | OXYGEN SATURATION: 96 % | HEART RATE: 85 BPM

## 2023-03-30 DIAGNOSIS — E11.9 DIABETES MELLITUS WITHOUT COMPLICATION: Primary | ICD-10-CM

## 2023-03-30 PROCEDURE — 3074F SYST BP LT 130 MM HG: CPT | Mod: CPTII,,, | Performed by: STUDENT IN AN ORGANIZED HEALTH CARE EDUCATION/TRAINING PROGRAM

## 2023-03-30 PROCEDURE — 3008F BODY MASS INDEX DOCD: CPT | Mod: CPTII,,, | Performed by: STUDENT IN AN ORGANIZED HEALTH CARE EDUCATION/TRAINING PROGRAM

## 2023-03-30 PROCEDURE — 99214 OFFICE O/P EST MOD 30 MIN: CPT | Mod: S$PBB,,, | Performed by: STUDENT IN AN ORGANIZED HEALTH CARE EDUCATION/TRAINING PROGRAM

## 2023-03-30 PROCEDURE — 1159F PR MEDICATION LIST DOCUMENTED IN MEDICAL RECORD: ICD-10-PCS | Mod: CPTII,,, | Performed by: STUDENT IN AN ORGANIZED HEALTH CARE EDUCATION/TRAINING PROGRAM

## 2023-03-30 PROCEDURE — 3078F PR MOST RECENT DIASTOLIC BLOOD PRESSURE < 80 MM HG: ICD-10-PCS | Mod: CPTII,,, | Performed by: STUDENT IN AN ORGANIZED HEALTH CARE EDUCATION/TRAINING PROGRAM

## 2023-03-30 PROCEDURE — 99214 PR OFFICE/OUTPT VISIT, EST, LEVL IV, 30-39 MIN: ICD-10-PCS | Mod: S$PBB,,, | Performed by: STUDENT IN AN ORGANIZED HEALTH CARE EDUCATION/TRAINING PROGRAM

## 2023-03-30 PROCEDURE — 1159F MED LIST DOCD IN RCRD: CPT | Mod: CPTII,,, | Performed by: STUDENT IN AN ORGANIZED HEALTH CARE EDUCATION/TRAINING PROGRAM

## 2023-03-30 PROCEDURE — 99214 OFFICE O/P EST MOD 30 MIN: CPT | Mod: PBBFAC | Performed by: STUDENT IN AN ORGANIZED HEALTH CARE EDUCATION/TRAINING PROGRAM

## 2023-03-30 PROCEDURE — 3078F DIAST BP <80 MM HG: CPT | Mod: CPTII,,, | Performed by: STUDENT IN AN ORGANIZED HEALTH CARE EDUCATION/TRAINING PROGRAM

## 2023-03-30 PROCEDURE — 3008F PR BODY MASS INDEX (BMI) DOCUMENTED: ICD-10-PCS | Mod: CPTII,,, | Performed by: STUDENT IN AN ORGANIZED HEALTH CARE EDUCATION/TRAINING PROGRAM

## 2023-03-30 PROCEDURE — 3074F PR MOST RECENT SYSTOLIC BLOOD PRESSURE < 130 MM HG: ICD-10-PCS | Mod: CPTII,,, | Performed by: STUDENT IN AN ORGANIZED HEALTH CARE EDUCATION/TRAINING PROGRAM

## 2023-03-30 RX ORDER — ATORVASTATIN CALCIUM 80 MG/1
80 TABLET, FILM COATED ORAL DAILY
Qty: 90 TABLET | Refills: 3 | Status: SHIPPED | OUTPATIENT
Start: 2023-03-30 | End: 2023-08-29 | Stop reason: SDUPTHER

## 2023-03-30 RX ORDER — METOPROLOL SUCCINATE 25 MG/1
25 TABLET, EXTENDED RELEASE ORAL DAILY
Qty: 90 TABLET | Refills: 3 | Status: ON HOLD | OUTPATIENT
Start: 2023-03-30 | End: 2023-11-07 | Stop reason: HOSPADM

## 2023-03-30 NOTE — PATIENT INSTRUCTIONS
Please take aspirin 81 mg daily  Please take atorvastatin 80 mg daily    Please continue plavix 75 mg daily   Please take metoprolol 25 mg daily   Labs today including test for diabetes  If HbA1c is elevated, we recommend taking metformin. Please talk to your PCP about that.   Recommend quitting smoking.  Refer to cardiac rehab

## 2023-03-30 NOTE — PROGRESS NOTES
PCP: Primary Doctor No    Referring Provider:     Subjective:   Johana Higgins is a 52 y.o. female active smoker with hx of hypertension and diabetes, who presents for an inpatient follow-up visit.     Hospitalized from 12/5 to 12/7/22 for NSTEMI.   Presented with chest pain with elevated troponin levels. The patient was seen at [facility] 12/3/22 with similar symptoms and elevated troponin 240->480->2747; leaving AMA before LHC. She reports chest started Thursday 12/1/22 while at rest, self resolving. Later that night after work her symptoms returned. Today she begin having left side pain under her arm that radiated to her chest and into left jaw. Pain is described as tightness/pressure type pain, rated 7/10 on pain scale at worst. Denies alleviating or aggravating factors. No reported palpitations, n/v, SOB, diaphoresis, abdominal pain, or recent illness. History of smoking for 40 years and Marijuana use. Underwent LHC  which revealed 99% thrombotic occlusion of OM1 s/p JANENE x2. Discharged home In stable condition on ASA, ticagrelor, atorvastatin 80 mg daily and Toprol Xl 25 mg daily. Scheduled for IPF on Jan 3rd, 2023.    No-show for 1/3/2023 inpatient f/u with Oneyda Dick NP. Following this, re-scheduled for 3/7/23 with me- canceled/no show.     3/30/23: No active complaints. DAPT changed to plavix by PCP due to cost issues and started on metformin for new-onset diabetes found inpatient. However, she has not been compliant with her medications including metformin. Extensively counseled re: medication adherence, following up in office and smoking cessation.  Denies any chest pain/tightness, dyspnea, palpitations, lightheadedness or syncope.        Fhx: Significant FH of CAD (father, brother had MI)  Shx: Active smoker    EKG 3/7/23: Normal sinus rhythm, low voltage QRS in precordial leads     ECHO Results for orders placed during the hospital encounter of 12/03/22    Echo    Interpretation Summary  · The left  ventricle is normal in size with normal systolic function.  · The estimated ejection fraction is 55%.  · Normal left ventricular diastolic function.  · Normal right ventricular size.  · Moderate mitral regurgitation.  · Moderate tricuspid regurgitation.  · There are segmental left ventricular wall motion abnormalities.    [facility]   Results for orders placed during the hospital encounter of 12/05/22    Cardiac catheterization    Conclusion    The Mid LAD lesion was 50% stenosed.    The 1st Mrg lesion was 99% stenosed with 0% stenosis post-intervention.    The left ventricular end diastolic pressure was normal.    The pre-procedure left ventricular end diastolic pressure was 6.    A stent was successfully placed at 12 JENNIFER for 9 sec.    A stent was successfully placed.    The estimated blood loss was none.    There was single vessel coronary artery disease.    Single vessel CAD ( 99% thrombotic occlusion of first OM branch - culprit)  Normal left sided filling pressures - LVEDP 6mmHg  S/P successful PCI of first OM branch with two overlapping 2.75 x 18mm and 2.75 x 12mm Synergy JANENE.    Plan:  DAPT (aspirin and brillinta) - 1 year  Statin therapy  Risk factor modification and life-style changes    The procedure log was documented by Documenter: June Law RN and verified by Dennys Barakat MD.    Date: 12/6/2022  Time: 3:59 PM        Lab Results   Component Value Date     12/06/2022    K 4.0 12/06/2022     12/06/2022    CO2 26 12/06/2022    BUN 15 12/06/2022    CREATININE 1.05 (H) 12/06/2022    CALCIUM 9.0 12/06/2022    ANIONGAP 13 12/06/2022       Lab Results   Component Value Date    CHOL 234 (H) 12/03/2022     Lab Results   Component Value Date    HDL 50 12/03/2022     Lab Results   Component Value Date    LDLCALC 131 12/03/2022     Lab Results   Component Value Date    TRIG 264 (H) 12/03/2022     Lab Results   Component Value Date    CHOLHDL 4.7 12/03/2022       Lab Results   Component Value  "Date    WBC 8.03 12/06/2022    HGB 13.1 12/06/2022    HCT 41.7 12/06/2022    MCV 90.3 12/06/2022     12/06/2022           Current Outpatient Medications:     atorvastatin (LIPITOR) 80 MG tablet, Take 1 tablet (80 mg total) by mouth every evening., Disp: 90 tablet, Rfl: 3    clopidogreL (PLAVIX) 75 mg tablet, Take 1 tablet (75 mg total) by mouth once daily., Disp: 30 tablet, Rfl: 11    aspirin (ECOTRIN) 81 MG EC tablet, Take 1 tablet (81 mg total) by mouth once daily. (Patient not taking: Reported on 3/30/2023), Disp: 90 tablet, Rfl: 3    levETIRAcetam (KEPPRA) 500 MG Tab, Take 1 tablet (500 mg total) by mouth 2 (two) times daily. (Patient not taking: Reported on 3/30/2023), Disp: 180 tablet, Rfl: 1    metFORMIN (GLUCOPHAGE) 500 MG tablet, Take 1 tablet (500 mg total) by mouth 2 (two) times daily with meals. (Patient not taking: Reported on 3/7/2023), Disp: 180 tablet, Rfl: 1    metoprolol succinate (TOPROL-XL) 25 MG 24 hr tablet, Take 0.5 tablets (12.5 mg total) by mouth once daily. (Patient not taking: Reported on 3/30/2023), Disp: 15 tablet, Rfl: 11    Review of Systems   Constitutional:  Negative for chills, diaphoresis, fever and malaise/fatigue.   Respiratory:  Negative for cough and shortness of breath.    Cardiovascular:  Negative for chest pain, palpitations, orthopnea, claudication, leg swelling and PND.   Gastrointestinal:  Negative for abdominal pain, heartburn, nausea and vomiting.   Neurological:  Negative for dizziness.       Objective:   /60   Pulse 85   Resp 18   Ht 5' 3" (1.6 m)   Wt 62.1 kg (137 lb)   SpO2 96%   BMI 24.27 kg/m²     Physical Exam  Constitutional:       General: She is not in acute distress.     Appearance: Normal appearance.   Cardiovascular:      Rate and Rhythm: Normal rate and regular rhythm.      Pulses: Normal pulses.      Heart sounds: Normal heart sounds. No murmur heard.    No friction rub. No gallop.   Pulmonary:      Effort: Pulmonary effort is normal. "      Breath sounds: Normal breath sounds. No wheezing or rales.   Musculoskeletal:      Right lower leg: No edema.      Left lower leg: No edema.   Skin:     General: Skin is warm and dry.   Neurological:      Mental Status: She is alert.         Assessment:   No diagnosis found.      Plan:   No problem-specific Assessment & Plan notes found for this encounter.    CAD   H/o NSTEMI in December 2022  S/p JANENE x2 to OM1    Plan:  - Continue aspirin 81 mg daily and clopidogrel 75 mg daily  - Continue atorvastatin 80 mg daily  - Toprol XL 25 mg daily re-prescribed  - Refer to cardiac rehab  - Extensively counseled re: medication adherence and follow-up    Smoking cessation  Extensively counseled re: smoking cessation   Declined NRT    Diabetes   - Managed by PCP    F/U in 2 months

## 2023-05-31 ENCOUNTER — PATIENT OUTREACH (OUTPATIENT)
Dept: ADMINISTRATIVE | Facility: HOSPITAL | Age: 53
End: 2023-05-31

## 2023-08-29 DIAGNOSIS — I24.9 ACS (ACUTE CORONARY SYNDROME): ICD-10-CM

## 2023-08-29 DIAGNOSIS — R56.9 SEIZURES: ICD-10-CM

## 2023-08-30 RX ORDER — LEVETIRACETAM 500 MG/1
500 TABLET ORAL 2 TIMES DAILY
Qty: 180 TABLET | Refills: 1 | OUTPATIENT
Start: 2023-08-30 | End: 2024-02-26

## 2023-08-30 RX ORDER — CLOPIDOGREL BISULFATE 75 MG/1
75 TABLET ORAL DAILY
Qty: 30 TABLET | Refills: 11 | OUTPATIENT
Start: 2023-08-30 | End: 2024-08-29

## 2023-08-31 RX ORDER — ATORVASTATIN CALCIUM 80 MG/1
80 TABLET, FILM COATED ORAL DAILY
Qty: 90 TABLET | Refills: 3 | Status: SHIPPED | OUTPATIENT
Start: 2023-08-31 | End: 2024-01-09 | Stop reason: SDUPTHER

## 2023-09-21 ENCOUNTER — PATIENT OUTREACH (OUTPATIENT)
Dept: ADMINISTRATIVE | Facility: HOSPITAL | Age: 53
End: 2023-09-21

## 2023-09-21 NOTE — LETTER
September 21, 2023    Johana Nilda Ronaldo  [address]  [place] [address]              Dear Mrs. Higgins:    Good afternoon. Our records indicate that you have not yet turned in your cologuard. Also it is time for you to schedule an appointment with  and have some lab work done and get your mammogram scheduled. If you have any questions or concerns you can reach me back through portal or you can call the office at [phone]. Thank you      If you have any questions or concerns, please don't hesitate to call.    Sincerely,        Collins Caban IV, DO

## 2023-11-05 ENCOUNTER — HOSPITAL ENCOUNTER (OUTPATIENT)
Facility: HOSPITAL | Age: 53
Discharge: HOME | End: 2023-11-07
Attending: FAMILY MEDICINE | Admitting: FAMILY MEDICINE

## 2023-11-05 DIAGNOSIS — E86.0 DEHYDRATION: ICD-10-CM

## 2023-11-05 DIAGNOSIS — R07.9 CHEST PAIN, UNSPECIFIED TYPE: ICD-10-CM

## 2023-11-05 DIAGNOSIS — J18.9 PNEUMONIA OF LEFT LUNG DUE TO INFECTIOUS ORGANISM: ICD-10-CM

## 2023-11-05 DIAGNOSIS — J18.9 PNEUMONIA OF LEFT LUNG DUE TO INFECTIOUS ORGANISM, UNSPECIFIED PART OF LUNG: Primary | ICD-10-CM

## 2023-11-05 DIAGNOSIS — R53.83 FATIGUE: ICD-10-CM

## 2023-11-05 DIAGNOSIS — E11.9 DIABETES MELLITUS WITHOUT COMPLICATION: ICD-10-CM

## 2023-11-05 DIAGNOSIS — E87.1 HYPONATREMIA: ICD-10-CM

## 2023-11-05 DIAGNOSIS — E87.6 HYPOKALEMIA: ICD-10-CM

## 2023-11-05 DIAGNOSIS — E83.42 HYPOMAGNESEMIA: ICD-10-CM

## 2023-11-05 DIAGNOSIS — J18.9 PNEUMONIA OF BOTH LUNGS DUE TO INFECTIOUS ORGANISM, UNSPECIFIED PART OF LUNG: ICD-10-CM

## 2023-11-05 PROBLEM — R06.00 DYSPNEA: Status: ACTIVE | Noted: 2023-11-05

## 2023-11-05 LAB
ALBUMIN SERPL BCP-MCNC: 3 G/DL (ref 3.5–5)
ALBUMIN/GLOB SERPL: 0.8 {RATIO}
ALP SERPL-CCNC: 90 U/L (ref 46–118)
ALT SERPL W P-5'-P-CCNC: 16 U/L (ref 13–56)
ANION GAP SERPL CALCULATED.3IONS-SCNC: 16 MMOL/L (ref 7–16)
AST SERPL W P-5'-P-CCNC: 14 U/L (ref 15–37)
BACTERIA #/AREA URNS HPF: ABNORMAL /HPF
BASOPHILS # BLD AUTO: 0.02 K/UL (ref 0–0.2)
BASOPHILS NFR BLD AUTO: 0.1 % (ref 0–1)
BILIRUB SERPL-MCNC: 0.9 MG/DL (ref ?–1.2)
BILIRUB UR QL STRIP: NEGATIVE
BUN SERPL-MCNC: 21 MG/DL (ref 7–18)
BUN/CREAT SERPL: 16 (ref 6–20)
CALCIUM SERPL-MCNC: 8.8 MG/DL (ref 8.5–10.1)
CHLORIDE SERPL-SCNC: 95 MMOL/L (ref 98–107)
CLARITY UR: ABNORMAL
CO2 SERPL-SCNC: 21 MMOL/L (ref 21–32)
COLOR UR: YELLOW
CREAT SERPL-MCNC: 1.33 MG/DL (ref 0.55–1.02)
DIFFERENTIAL METHOD BLD: ABNORMAL
EGFR (NO RACE VARIABLE) (RUSH/TITUS): 47 ML/MIN/1.73M2
EOSINOPHIL # BLD AUTO: 0.01 K/UL (ref 0–0.5)
EOSINOPHIL NFR BLD AUTO: 0 % (ref 1–4)
ERYTHROCYTE [DISTWIDTH] IN BLOOD BY AUTOMATED COUNT: 12.8 % (ref 11.5–14.5)
EST. AVERAGE GLUCOSE BLD GHB EST-MCNC: 120 MG/DL
FLUAV AG UPPER RESP QL IA.RAPID: NEGATIVE
FLUBV AG UPPER RESP QL IA.RAPID: NEGATIVE
GLOBULIN SER-MCNC: 4 G/DL (ref 2–4)
GLUCOSE SERPL-MCNC: 116 MG/DL (ref 70–105)
GLUCOSE SERPL-MCNC: 160 MG/DL (ref 74–106)
GLUCOSE SERPL-MCNC: 169 MG/DL (ref 70–105)
GLUCOSE SERPL-MCNC: 325 MG/DL (ref 70–105)
GLUCOSE UR STRIP-MCNC: 250 MG/DL
HBA1C MFR BLD HPLC: 6.2 % (ref 4.5–6.6)
HCT VFR BLD AUTO: 37.9 % (ref 38–47)
HGB BLD-MCNC: 12.5 G/DL (ref 12–16)
KETONES UR STRIP-SCNC: NEGATIVE MG/DL
LACTATE SERPL-SCNC: 1.5 MMOL/L (ref 0.4–2)
LEUKOCYTE ESTERASE UR QL STRIP: NEGATIVE
LYMPHOCYTES # BLD AUTO: 0.94 K/UL (ref 1–4.8)
LYMPHOCYTES NFR BLD AUTO: 4.4 % (ref 27–41)
LYMPHOCYTES NFR BLD MANUAL: 5 % (ref 27–41)
MAGNESIUM SERPL-MCNC: 1.1 MG/DL (ref 1.7–2.3)
MCH RBC QN AUTO: 29.2 PG (ref 27–31)
MCHC RBC AUTO-ENTMCNC: 33 G/DL (ref 32–36)
MCV RBC AUTO: 88.6 FL (ref 80–96)
MONOCYTES # BLD AUTO: 0.82 K/UL (ref 0–0.8)
MONOCYTES NFR BLD AUTO: 3.8 % (ref 2–6)
MONOCYTES NFR BLD MANUAL: 3 % (ref 2–6)
MPC BLD CALC-MCNC: 10 FL (ref 9.4–12.4)
MUCOUS THREADS #/AREA URNS HPF: ABNORMAL /HPF
NEUTROPHILS # BLD AUTO: 19.7 K/UL (ref 1.8–7.7)
NEUTROPHILS NFR BLD AUTO: 91.7 % (ref 53–65)
NEUTS BAND NFR BLD MANUAL: 19 % (ref 1–5)
NEUTS SEG NFR BLD MANUAL: 73 % (ref 50–62)
NITRITE UR QL STRIP: NEGATIVE
NRBC BLD MANUAL-RTO: ABNORMAL %
NT-PROBNP SERPL-MCNC: 3488 PG/ML (ref 1–125)
PH UR STRIP: 5.5 PH UNITS
PLATELET # BLD AUTO: 262 K/UL (ref 150–400)
PLATELET MORPHOLOGY: NORMAL
POTASSIUM SERPL-SCNC: 3.3 MMOL/L (ref 3.5–5.1)
PROT SERPL-MCNC: 7 G/DL (ref 6.4–8.2)
PROT UR QL STRIP: 30
RBC # BLD AUTO: 4.28 M/UL (ref 4.2–5.4)
RBC # UR STRIP: ABNORMAL /UL
RBC #/AREA URNS HPF: ABNORMAL /HPF
RBC MORPH BLD: NORMAL
SARS-COV-2 RDRP RESP QL NAA+PROBE: NEGATIVE
SODIUM SERPL-SCNC: 129 MMOL/L (ref 136–145)
SP GR UR STRIP: 1.01
SQUAMOUS #/AREA URNS LPF: ABNORMAL /LPF
TROPONIN I SERPL DL<=0.01 NG/ML-MCNC: 5.9 PG/ML
TROPONIN I SERPL DL<=0.01 NG/ML-MCNC: 6 PG/ML
UROBILINOGEN UR STRIP-ACNC: 0.2 MG/DL
WBC # BLD AUTO: 21.49 K/UL (ref 4.5–11)
WBC #/AREA URNS HPF: ABNORMAL /HPF
YEAST #/AREA URNS HPF: ABNORMAL /HPF

## 2023-11-05 PROCEDURE — 87635 SARS-COV-2 COVID-19 AMP PRB: CPT

## 2023-11-05 PROCEDURE — 94640 AIRWAY INHALATION TREATMENT: CPT

## 2023-11-05 PROCEDURE — 83605 ASSAY OF LACTIC ACID: CPT

## 2023-11-05 PROCEDURE — 25000003 PHARM REV CODE 250

## 2023-11-05 PROCEDURE — 82962 GLUCOSE BLOOD TEST: CPT

## 2023-11-05 PROCEDURE — 81001 URINALYSIS AUTO W/SCOPE: CPT

## 2023-11-05 PROCEDURE — G0378 HOSPITAL OBSERVATION PER HR: HCPCS

## 2023-11-05 PROCEDURE — 96361 HYDRATE IV INFUSION ADD-ON: CPT

## 2023-11-05 PROCEDURE — 93010 EKG 12-LEAD: ICD-10-PCS | Mod: ,,, | Performed by: HOSPITALIST

## 2023-11-05 PROCEDURE — 99285 PR EMERGENCY DEPT VISIT,LEVEL V: ICD-10-PCS | Mod: ,,,

## 2023-11-05 PROCEDURE — 63600175 PHARM REV CODE 636 W HCPCS

## 2023-11-05 PROCEDURE — 27000982 HC MATTRESS, MATRIX LAL RENTAL

## 2023-11-05 PROCEDURE — 99900035 HC TECH TIME PER 15 MIN (STAT)

## 2023-11-05 PROCEDURE — 83735 ASSAY OF MAGNESIUM: CPT

## 2023-11-05 PROCEDURE — 87804 INFLUENZA ASSAY W/OPTIC: CPT

## 2023-11-05 PROCEDURE — 93005 ELECTROCARDIOGRAM TRACING: CPT

## 2023-11-05 PROCEDURE — 85025 COMPLETE CBC W/AUTO DIFF WBC: CPT

## 2023-11-05 PROCEDURE — 84484 ASSAY OF TROPONIN QUANT: CPT

## 2023-11-05 PROCEDURE — 27000221 HC OXYGEN, UP TO 24 HOURS

## 2023-11-05 PROCEDURE — 93010 ELECTROCARDIOGRAM REPORT: CPT | Mod: ,,, | Performed by: HOSPITALIST

## 2023-11-05 PROCEDURE — 96372 THER/PROPH/DIAG INJ SC/IM: CPT | Mod: 59

## 2023-11-05 PROCEDURE — 99900031 HC PATIENT EDUCATION (STAT)

## 2023-11-05 PROCEDURE — 96367 TX/PROPH/DG ADDL SEQ IV INF: CPT

## 2023-11-05 PROCEDURE — 25000242 PHARM REV CODE 250 ALT 637 W/ HCPCS

## 2023-11-05 PROCEDURE — 83036 HEMOGLOBIN GLYCOSYLATED A1C: CPT

## 2023-11-05 PROCEDURE — 96366 THER/PROPH/DIAG IV INF ADDON: CPT

## 2023-11-05 PROCEDURE — 99285 EMERGENCY DEPT VISIT HI MDM: CPT | Mod: ,,,

## 2023-11-05 PROCEDURE — 27000944

## 2023-11-05 PROCEDURE — 99285 EMERGENCY DEPT VISIT HI MDM: CPT | Mod: 25

## 2023-11-05 PROCEDURE — 87040 BLOOD CULTURE FOR BACTERIA: CPT

## 2023-11-05 PROCEDURE — 80053 COMPREHEN METABOLIC PANEL: CPT

## 2023-11-05 PROCEDURE — 94761 N-INVAS EAR/PLS OXIMETRY MLT: CPT

## 2023-11-05 PROCEDURE — 96365 THER/PROPH/DIAG IV INF INIT: CPT

## 2023-11-05 PROCEDURE — 83880 ASSAY OF NATRIURETIC PEPTIDE: CPT

## 2023-11-05 RX ORDER — ASPIRIN 81 MG/1
81 TABLET ORAL DAILY
Status: DISCONTINUED | OUTPATIENT
Start: 2023-11-06 | End: 2023-11-07 | Stop reason: HOSPADM

## 2023-11-05 RX ORDER — ONDANSETRON 2 MG/ML
4 INJECTION INTRAMUSCULAR; INTRAVENOUS EVERY 8 HOURS PRN
Status: DISCONTINUED | OUTPATIENT
Start: 2023-11-05 | End: 2023-11-07 | Stop reason: HOSPADM

## 2023-11-05 RX ORDER — MAGNESIUM SULFATE HEPTAHYDRATE 40 MG/ML
2 INJECTION, SOLUTION INTRAVENOUS
Status: COMPLETED | OUTPATIENT
Start: 2023-11-05 | End: 2023-11-05

## 2023-11-05 RX ORDER — POTASSIUM CHLORIDE 20 MEQ/1
40 TABLET, EXTENDED RELEASE ORAL
Status: COMPLETED | OUTPATIENT
Start: 2023-11-05 | End: 2023-11-05

## 2023-11-05 RX ORDER — ACETAMINOPHEN 325 MG/1
650 TABLET ORAL EVERY 4 HOURS PRN
Status: DISCONTINUED | OUTPATIENT
Start: 2023-11-05 | End: 2023-11-07 | Stop reason: HOSPADM

## 2023-11-05 RX ORDER — SODIUM CHLORIDE 9 MG/ML
1000 INJECTION, SOLUTION INTRAVENOUS
Status: COMPLETED | OUTPATIENT
Start: 2023-11-05 | End: 2023-11-05

## 2023-11-05 RX ORDER — ATORVASTATIN CALCIUM 40 MG/1
80 TABLET, FILM COATED ORAL DAILY
Status: DISCONTINUED | OUTPATIENT
Start: 2023-11-06 | End: 2023-11-07 | Stop reason: HOSPADM

## 2023-11-05 RX ORDER — CLOPIDOGREL BISULFATE 75 MG/1
75 TABLET ORAL DAILY
Status: DISCONTINUED | OUTPATIENT
Start: 2023-11-06 | End: 2023-11-07 | Stop reason: HOSPADM

## 2023-11-05 RX ORDER — GLUCAGON 1 MG
1 KIT INJECTION
Status: DISCONTINUED | OUTPATIENT
Start: 2023-11-05 | End: 2023-11-07 | Stop reason: HOSPADM

## 2023-11-05 RX ORDER — TALC
6 POWDER (GRAM) TOPICAL NIGHTLY PRN
Status: DISCONTINUED | OUTPATIENT
Start: 2023-11-05 | End: 2023-11-07 | Stop reason: HOSPADM

## 2023-11-05 RX ORDER — IPRATROPIUM BROMIDE AND ALBUTEROL SULFATE 2.5; .5 MG/3ML; MG/3ML
3 SOLUTION RESPIRATORY (INHALATION)
Status: DISCONTINUED | OUTPATIENT
Start: 2023-11-05 | End: 2023-11-07 | Stop reason: HOSPADM

## 2023-11-05 RX ORDER — SODIUM CHLORIDE 9 MG/ML
1000 INJECTION, SOLUTION INTRAVENOUS
Status: DISCONTINUED | OUTPATIENT
Start: 2023-11-05 | End: 2023-11-06

## 2023-11-05 RX ORDER — SODIUM CHLORIDE 0.9 % (FLUSH) 0.9 %
10 SYRINGE (ML) INJECTION
Status: DISCONTINUED | OUTPATIENT
Start: 2023-11-05 | End: 2023-11-07 | Stop reason: HOSPADM

## 2023-11-05 RX ORDER — LANOLIN ALCOHOL/MO/W.PET/CERES
800 CREAM (GRAM) TOPICAL
Status: DISCONTINUED | OUTPATIENT
Start: 2023-11-05 | End: 2023-11-07 | Stop reason: HOSPADM

## 2023-11-05 RX ORDER — ASPIRIN 325 MG
325 TABLET ORAL
Status: COMPLETED | OUTPATIENT
Start: 2023-11-05 | End: 2023-11-05

## 2023-11-05 RX ORDER — NALOXONE HCL 0.4 MG/ML
0.02 VIAL (ML) INJECTION
Status: DISCONTINUED | OUTPATIENT
Start: 2023-11-05 | End: 2023-11-07 | Stop reason: HOSPADM

## 2023-11-05 RX ORDER — ACETAMINOPHEN 500 MG
1000 TABLET ORAL
Status: COMPLETED | OUTPATIENT
Start: 2023-11-05 | End: 2023-11-05

## 2023-11-05 RX ORDER — ACETAMINOPHEN 325 MG/1
650 TABLET ORAL EVERY 4 HOURS PRN
Status: DISCONTINUED | OUTPATIENT
Start: 2023-11-05 | End: 2023-11-05

## 2023-11-05 RX ORDER — ENOXAPARIN SODIUM 100 MG/ML
30 INJECTION SUBCUTANEOUS EVERY 24 HOURS
Status: DISCONTINUED | OUTPATIENT
Start: 2023-11-05 | End: 2023-11-07 | Stop reason: HOSPADM

## 2023-11-05 RX ORDER — IBUPROFEN 200 MG
24 TABLET ORAL
Status: DISCONTINUED | OUTPATIENT
Start: 2023-11-05 | End: 2023-11-07 | Stop reason: HOSPADM

## 2023-11-05 RX ORDER — IBUPROFEN 200 MG
16 TABLET ORAL
Status: DISCONTINUED | OUTPATIENT
Start: 2023-11-05 | End: 2023-11-07 | Stop reason: HOSPADM

## 2023-11-05 RX ORDER — IBUPROFEN 200 MG
24 TABLET ORAL
Status: DISCONTINUED | OUTPATIENT
Start: 2023-11-05 | End: 2023-11-06 | Stop reason: SDUPTHER

## 2023-11-05 RX ORDER — INSULIN ASPART 100 [IU]/ML
0-5 INJECTION, SOLUTION INTRAVENOUS; SUBCUTANEOUS
Status: DISCONTINUED | OUTPATIENT
Start: 2023-11-05 | End: 2023-11-07 | Stop reason: HOSPADM

## 2023-11-05 RX ADMIN — CEFTRIAXONE SODIUM 1 G: 1 INJECTION, POWDER, FOR SOLUTION INTRAMUSCULAR; INTRAVENOUS at 03:11

## 2023-11-05 RX ADMIN — IPRATROPIUM BROMIDE AND ALBUTEROL SULFATE 3 ML: 2.5; .5 SOLUTION RESPIRATORY (INHALATION) at 07:11

## 2023-11-05 RX ADMIN — IPRATROPIUM BROMIDE AND ALBUTEROL SULFATE 3 ML: 2.5; .5 SOLUTION RESPIRATORY (INHALATION) at 03:11

## 2023-11-05 RX ADMIN — ASPIRIN 325 MG ORAL TABLET 325 MG: 325 PILL ORAL at 10:11

## 2023-11-05 RX ADMIN — AZITHROMYCIN MONOHYDRATE 500 MG: 500 INJECTION, POWDER, LYOPHILIZED, FOR SOLUTION INTRAVENOUS at 03:11

## 2023-11-05 RX ADMIN — SODIUM CHLORIDE 1000 ML: 9 INJECTION, SOLUTION INTRAVENOUS at 11:11

## 2023-11-05 RX ADMIN — MAGNESIUM SULFATE 2 G: 2 INJECTION INTRAVENOUS at 11:11

## 2023-11-05 RX ADMIN — ENOXAPARIN SODIUM 30 MG: 30 INJECTION SUBCUTANEOUS at 04:11

## 2023-11-05 RX ADMIN — POTASSIUM CHLORIDE 40 MEQ: 1500 TABLET, EXTENDED RELEASE ORAL at 01:11

## 2023-11-05 RX ADMIN — ACETAMINOPHEN 650 MG: 325 TABLET ORAL at 07:11

## 2023-11-05 RX ADMIN — SODIUM CHLORIDE 500 ML: 9 INJECTION, SOLUTION INTRAVENOUS at 10:11

## 2023-11-05 RX ADMIN — ACETAMINOPHEN 1000 MG: 500 TABLET ORAL at 10:11

## 2023-11-05 RX ADMIN — CEFTRIAXONE SODIUM 1 G: 1 INJECTION, POWDER, FOR SOLUTION INTRAMUSCULAR; INTRAVENOUS at 11:11

## 2023-11-05 RX ADMIN — INSULIN ASPART 4 UNITS: 100 INJECTION, SOLUTION INTRAVENOUS; SUBCUTANEOUS at 04:11

## 2023-11-05 NOTE — ED PROVIDER NOTES
Encounter Date: 11/5/2023       History     Chief Complaint   Patient presents with    Otalgia    Nausea    Diarrhea     Patient coems in with complaints of ear pain, nausea , cough, and diarrhea. Denies chest pain , but states this is similar to how her heart attack felt in 2022. Symptoms started Friday night at about 8 pm     Patient is a 53-year-old white female who presents to the emergency department with complaints of nausea, diarrhea, fatigue, myalgias, shortness of breath, and left-sided chest pressure.  She reports that her symptoms initially began 2 days ago with a fullness sensation in her left ear.  It then progressed to myalgias in her neck, left shoulder, and her left chest.  She does report that she had a myocardial infarction last year which presented with similar symptoms.  Her illness then progress to nausea, fatigue, and decreased appetite.  She states she is not had any known fever.  She does report that she is had a recent known COVID exposure.  She also is supposed to be taking for daily medications but states she is not been able to take her medicine in the past couple of days.  She is followed by Dr. Erazo with [facility] but is unsure when the last time she saw her.  She is not taken anything for the symptoms prior to arrival to the ED. she is alert and oriented x4.  She appears fatigued at the time of the exam.  She is noted to be mildly hypotensive with a blood pressure of 93/59, heart rate of 116, tachypneic at a rate of 29, and has an oxygen saturation of 94% on room air.    The history is provided by the patient.     Review of patient's allergies indicates:   Allergen Reactions    Penicillin Hives    Codeine Nausea And Vomiting     Past Medical History:   Diagnosis Date    Diabetes mellitus     Hyperlipidemia     Hypertension      Past Surgical History:   Procedure Laterality Date    ANGIOGRAM, CORONARY, WITH LEFT HEART CATHETERIZATION N/A 12/6/2022    Procedure: Angiogram,  Coronary, with Left Heart Cath;  Surgeon: Dennys Barakat MD;  Location: [facility] CATH LAB;  Service: Cardiology;  Laterality: N/A;     Family History   Problem Relation Age of Onset    Heart attack Father     Heart attack Brother     Heart attack Maternal Grandfather     Heart disease Paternal Grandfather      Social History     Tobacco Use    Smoking status: Every Day     Types: Cigarettes   Substance Use Topics    Alcohol use: Never    Drug use: Yes     Types: Marijuana     Review of Systems   Constitutional:  Positive for appetite change (Decreased) and fatigue. Negative for diaphoresis and fever.   HENT:  Positive for congestion and ear pain (left). Negative for facial swelling, hearing loss, sore throat and trouble swallowing.    Respiratory:  Positive for cough and shortness of breath. Negative for wheezing and stridor.    Cardiovascular:  Positive for chest pain (left-sided pressure). Negative for palpitations and leg swelling.   Gastrointestinal:  Positive for diarrhea and nausea. Negative for abdominal pain and vomiting.   Genitourinary:  Negative for dysuria and frequency.   Musculoskeletal:  Positive for myalgias. Negative for back pain, gait problem, neck pain and neck stiffness.   Skin:  Negative for color change, pallor and rash.   Neurological:  Negative for dizziness, seizures, syncope, facial asymmetry, speech difficulty, weakness, light-headedness and numbness.   Psychiatric/Behavioral:  Negative for confusion. The patient is not nervous/anxious.    All other systems reviewed and are negative.      Physical Exam     Initial Vitals [11/05/23 1008]   BP Pulse Resp Temp SpO2   (!) 93/59 (!) 116 (!) 29 99.2 °F (37.3 °C) (!) 94 %      MAP       --         Physical Exam    Nursing note and vitals reviewed.  Constitutional: She is cooperative. She is easily aroused. She appears ill.   HENT:   Head: Normocephalic and atraumatic.   Right Ear: Tympanic membrane and ear canal normal.   Left Ear: There  is tenderness. No foreign bodies. Tympanic membrane is injected and erythematous.   Mouth/Throat: Uvula is midline. Posterior oropharyngeal erythema present. No posterior oropharyngeal edema.   Abrasion noted to left canal where patient reports digging in her left ear recently.   Eyes: EOM are normal. Pupils are equal, round, and reactive to light.   Neck: Neck supple.   Normal range of motion.  Cardiovascular:  Regular rhythm, normal heart sounds and normal pulses.   Tachycardia present.         No murmur heard.  Pulmonary/Chest: Effort normal. Tachypnea noted. She has decreased breath sounds in the left lower field. She has no wheezes. She has no rhonchi. She has rales.   Abdominal: Abdomen is soft. Bowel sounds are normal. She exhibits no distension. There is no abdominal tenderness.   Musculoskeletal:         General: Normal range of motion.      Cervical back: Normal range of motion and neck supple.     Lymphadenopathy:     She has no cervical adenopathy.   Neurological: She is alert, oriented to person, place, and time and easily aroused. She has normal strength. GCS score is 15. GCS eye subscore is 4. GCS verbal subscore is 5. GCS motor subscore is 6.   Skin: Skin is warm and dry. Capillary refill takes less than 2 seconds.   Psychiatric: She has a normal mood and affect. Her behavior is normal. Judgment and thought content normal.         Medical Screening Exam   See Full Note    ED Course   Procedures  Labs Reviewed   COMPREHENSIVE METABOLIC PANEL - Abnormal; Notable for the following components:       Result Value    Sodium 129 (*)     Potassium 3.3 (*)     Chloride 95 (*)     Glucose 160 (*)     BUN 21 (*)     Creatinine 1.33 (*)     Albumin 3.0 (*)     AST 14 (*)     eGFR 47 (*)     All other components within normal limits   MAGNESIUM - Abnormal; Notable for the following components:    Magnesium 1.1 (*)     All other components within normal limits   CBC WITH DIFFERENTIAL - Abnormal; Notable for the  following components:    WBC 21.49 (*)     Hematocrit 37.9 (*)     Neutrophils % 91.7 (*)     Lymphocytes % 4.4 (*)     Neutrophils, Abs 19.70 (*)     Lymphocytes, Absolute 0.94 (*)     Eosinophils % 0.0 (*)     Monocytes, Absolute 0.82 (*)     All other components within normal limits   NT-PRO NATRIURETIC PEPTIDE - Abnormal; Notable for the following components:    ProBNP 3,488 (*)     All other components within normal limits   MANUAL DIFFERENTIAL - Abnormal; Notable for the following components:    Segmented Neutrophils, Man % 73 (*)     Bands, Man % 19 (*)     Lymphocytes, Man % 5 (*)     All other components within normal limits   POCT GLUCOSE MONITORING CONTINUOUS - Abnormal; Notable for the following components:    POC Glucose 169 (*)     All other components within normal limits   RAPID INFLUENZA A/B - Normal   TROPONIN I - Normal   SARS-COV-2 RNA AMPLIFICATION, QUAL - Normal    Narrative:     Negative SARS-CoV results should not be used as the sole basis for treatment or patient management decisions; negative results should be considered in the context of a patient's recent exposures, history and the presene of clinical signs and symptoms consistent with COVID-19.  Negative results should be treated as presumptive and confirmed by molecular assay, if necessary for patient management.   LACTIC ACID, PLASMA - Normal   CULTURE, BLOOD   CULTURE, BLOOD   CBC W/ AUTO DIFFERENTIAL    Narrative:     The following orders were created for panel order CBC auto differential.  Procedure                               Abnormality         Status                     ---------                               -----------         ------                     CBC with Differential[[id number]]        Abnormal            Final result               Manual Differential[[id number]]         Abnormal            Final result                 Please view results for these tests on the individual orders.   URINALYSIS, REFLEX TO URINE CULTURE           Imaging Results              X-Ray Chest AP Portable (Final result)  Result time 11/05/23 10:38:06      Final result by Luis Guardado MD (11/05/23 10:38:06)                   Impression:      The a few nonspecific bilateral interstitial opacities may reflect atelectasis.  Edema or infiltrates could have a similar appearance.      Electronically signed by: Lusi Guardado  Date:    11/05/2023  Time:    10:38               Narrative:    EXAMINATION:  XR CHEST AP PORTABLE    CLINICAL HISTORY:  fatigue;    TECHNIQUE:  Single frontal view of the chest was performed.    COMPARISON:  12/05/2022    FINDINGS:  Heart size normal.  A few nonspecific interstitial opacities are seen in the mid lungs and lower lungs.  No pneumothorax or large pleural effusion.                                       Medications   sodium chloride 0.9% bolus 500 mL 500 mL (0 mLs Intravenous Stopped 11/5/23 1121)   acetaminophen tablet 1,000 mg (1,000 mg Oral Given 11/5/23 1020)   aspirin tablet 325 mg (325 mg Oral Given 11/5/23 1020)   cefTRIAXone (ROCEPHIN) 1 g in dextrose 5 % in water (D5W) 100 mL IVPB (MB+) (0 g Intravenous Stopped 11/5/23 1221)   magnesium sulfate 2g in water 50mL IVPB (premix) (0 g Intravenous Stopped 11/5/23 1334)   0.9%  NaCl infusion (1,000 mLs Intravenous New Bag 11/5/23 1145)   potassium chloride SA CR tablet 40 mEq (40 mEq Oral Given 11/5/23 1323)     Medical Decision Making  Patient presents with nausea, diarrhea, fatigue, shortness of breath, and left chest pressure.  She is ill-appearing.  She is mildly hypotensive.  She is had a decreased appetite.  She is a risk for sepsis and we will initiate IV fluid therapy but cautiously due to her shortness of breath and initial concern for volume overload.  We have initiated an IV access and obtain specimens for laboratory evaluation.  We will initiate an chest x-ray to rule out effusion, pneumonia, or other cardiopulmonary event.  EKG was obtained for further evaluation and  noted to be sinus tachycardia with no signs of STEMI at this time.  Continuous cardiac monitoring.  IV hydration with initial normal saline 500 mL bolus.  We will also said a urine along with blood cultures.  We will put brought in the patient Tylenol 1000 mg p.o. for the myalgias along with aspirin 325 mg p.o. per chest pain protocol.    Results were discussed with the patient along with the plan to admit here for observation for dehydration and pneumonia.  We will replace electrolytes and trend labs.  We will also monitor kidney function and treated with IV normal saline for rehydration.  Repeat troponin will be sent for further evaluation and even though initial troponin was negative.  We will continue Rocephin 1 g daily until blood cultures are resulted and then adjust treatment accordingly.  Patient verbalizes understanding and in agreement with this plan.    Amount and/or Complexity of Data Reviewed  Independent Historian:      Details: Patient is a 53-year-old white female who presents to the emergency department with complaints of nausea, diarrhea, fatigue, myalgias, shortness of breath, and left-sided chest pressure.  She reports that her symptoms initially began 2 days ago with a fullness sensation in her left ear.  It then progressed to myalgias in her neck, left shoulder, and her left chest.  She does report that she had a myocardial infarction last year which presented with similar symptoms.  Her illness then progress to nausea, fatigue, and decreased appetite.  She states she is not had any known fever.  She does report that she is had a recent known COVID exposure.  She also is supposed to be taking for daily medications but states she is not been able to take her medicine in the past couple of days.  She is followed by Dr. Erazo with [facility] but is unsure when the last time she saw her.  She is not taken anything for the symptoms prior to arrival to the ED. she is alert and oriented  x4.  She appears fatigued at the time of the exam.  She is noted to be mildly hypotensive with a blood pressure of 93/59, heart rate of 116, tachypneic at a rate of 29, and has an oxygen saturation of 94% on room air.  Labs: ordered.     Details: CBC shows a WBC of 21.49, , H&H of 12 and 37, platelet count 262, 19 bands, 73 segmented neutrophils, magnesium was low at 1.1, initial troponin is negative at 6.0, BNP is elevated at 3488 (baseline from comparison), lactic acid was negative at 1.5, COVID swab negative, flu swab negative, CMP shows a sodium of 129, potassium at 3.3, normal CO2 of 21, glucose of 160, BUN 21, creatinine 1.33, GFR of 47, blood cultures were sent x2, urinalysis was ordered but unable to be obtained at this time we will follow during admission.  Repeat troponin will also be trended during admission.  Radiology: ordered.     Details: X-ray of the chest shows a few nonspecific bilateral interstitial opacities may reflect atelectasis.  Edema or infiltrates could have a similar appearance.  ECG/medicine tests:      Details: Sinus tachycardia at a rate of 115 beats per minute.  No STEMI.  Discussion of management or test interpretation with external provider(s): Case was discussed with Dr. Mckeon who is on-call for the hospitalist service at [facility].  He agrees with the plan of admitting for obs for dehydration and IV antibiotics.  Recommends Lovenox for VTE prophylaxis.  Recommends holding metoprolol but resuming her other home medications.  No other orders at this time.    Risk  OTC drugs.  Prescription drug management.  Risk Details: All historical, clinical, radiographic, and laboratory findings were reviewed with the patient/family in detail along in order to receive further evaluation and treatment.  All remaining questions and concerns were addressed at this time and the patient/family communicates understanding and agrees to proceed accordingly.  Similarly, all pertinent details of the  encounter were discussed with Dr. Mckeon at [facility] Hospitalist service who agrees to admit the patient at [facility] for further care as outlined above.    DIVYA DANIELSON  1:52 PM                    ED Course as of 11/05/23 1400   Sun Nov 05, 2023   1312 Into re-evaluate the patient.  She reports that she is feeling some better.  Blood pressure remains 91/59 but heart rate is improved at 88.  Dr. Mckeon with the hospital medicine service was contacted to discuss possible admission here [place] for dehydration, hyponatremia, hypomagnesemia, and pneumonia.  Awaiting further instructions at this time. [MC]      ED Course User Index  [MC] Julius Law FNP                    Clinical Impression:   Final diagnoses:  [R53.83] Fatigue  [E86.0] Dehydration  [J18.9] Pneumonia of left lung due to infectious organism, unspecified part of lung (Primary)  [E87.1] Hyponatremia  [E83.42] Hypomagnesemia  [E87.6] Hypokalemia  [R07.9] Chest pain, unspecified type        ED Disposition Condition    Observation Stable                Julius Law FNP  11/05/23 1401

## 2023-11-05 NOTE — ASSESSMENT & PLAN NOTE
11/5/23 - blood cultures x2 have been sent and are pending.  We will treat with Rocephin 1 g and Zithromax 500mg daily until cultures are resulted and adjust treatment accordingly.  DuoNebs q.6 hours while awake.  We will also monitor oxygen saturation with routine vital sign monitoring for any changes.  We will monitor CBC daily to trend elevated white count and bandemia.  Oxygen via nasal cannula protocol.      11/06/23 WBC 16.12  Continue rocephin and azithromycin, continue duonebs and supplemental o2

## 2023-11-05 NOTE — ASSESSMENT & PLAN NOTE
Patient has hyponatremia. We will monitor sodium Daily. The hyponatremia is due to Dehydration/hypovolemia. Patient did receive IV NS while in the ED. We will treat the hyponatremia with IV fluids as follows: NS at 100 ml/hr. The patient's sodium results have been reviewed and are listed below.  Recent Labs   Lab 11/05/23  1017   *       11/06/23 NA has improved to 134

## 2023-11-05 NOTE — Clinical Note
Diagnosis: Dehydration [276.51.ICD-9-CM]   Future Attending Provider: MERCEDEZ OLGUIN [[id number]]   Admitting Provider:: ORQUIDEA BENAVIDES IV [[id number]]   Special Needs:: Fall Risk [15]

## 2023-11-05 NOTE — HPI
11/5/23 patient admitted to [facility] for pneumonia, dehydration, hyponatremia, chest pain, hypokalemia, hypomagnesemia, dyspnea, and elevated creatinine.  She presented to the emergency department for myalgias, decreased appetite, left-sided chest pain, shortness of breath over the past 2-3 days.  She had an extensive workup which included EKG that showed sinus tachycardia, negative high sensitivity troponin, white blood cell count 21, bandemia of 19, chest x-ray which showed bilateral interstitial opacities, elevated creatinine with decreased GFR, and various electrolyte abnormalities.  She was hydrated with normal saline, Tylenol for the myalgias, aspirin per chest pain protocol, Rocephin 1 g, magnesium 2 g IV, and 40 mEq of potassium p.o..  She responded well to treatment and reports that she does feel better.  Blood cultures were sent for further evaluation.  She was noted to be mildly hypotensive but reports that her blood pressure does usually run on the low side.  Lactic acid was normal in the emergency department.  She was admitted to observation for IV rehydration and antibiotic therapy.

## 2023-11-05 NOTE — PLAN OF CARE
Problem: Adult Inpatient Plan of Care  Goal: Plan of Care Review  Outcome: Ongoing, Progressing  Goal: Patient-Specific Goal (Individualized)  Outcome: Ongoing, Progressing  Goal: Absence of Hospital-Acquired Illness or Injury  Outcome: Ongoing, Progressing  Goal: Optimal Comfort and Wellbeing  Outcome: Ongoing, Progressing  Goal: Readiness for Transition of Care  Outcome: Ongoing, Progressing     Problem: Diabetes Comorbidity  Goal: Blood Glucose Level Within Targeted Range  Outcome: Ongoing, Progressing     Problem: Fluid Imbalance (Pneumonia)  Goal: Fluid Balance  Outcome: Ongoing, Progressing     Problem: Infection (Pneumonia)  Goal: Resolution of Infection Signs and Symptoms  Outcome: Ongoing, Progressing     Problem: Respiratory Compromise (Pneumonia)  Goal: Effective Oxygenation and Ventilation  Outcome: Ongoing, Progressing     Problem: Fall Injury Risk  Goal: Absence of Fall and Fall-Related Injury  Outcome: Ongoing, Progressing     Problem: Infection  Goal: Absence of Infection Signs and Symptoms  Outcome: Ongoing, Progressing     Problem: Adjustment to Illness (Sepsis/Septic Shock)  Goal: Optimal Coping  Outcome: Ongoing, Progressing     Problem: Bleeding (Sepsis/Septic Shock)  Goal: Absence of Bleeding  Outcome: Ongoing, Progressing     Problem: Glycemic Control Impaired (Sepsis/Septic Shock)  Goal: Blood Glucose Level Within Desired Range  Outcome: Ongoing, Progressing     Problem: Infection Progression (Sepsis/Septic Shock)  Goal: Absence of Infection Signs and Symptoms  Outcome: Ongoing, Progressing     Problem: Nutrition Impaired (Sepsis/Septic Shock)  Goal: Optimal Nutrition Intake  Outcome: Ongoing, Progressing

## 2023-11-05 NOTE — ASSESSMENT & PLAN NOTE
11/5/23 - IV rehydration with normal saline at 125 mL/hour.  Patient has been experiencing decreased appetite but we will encourage p.o. intake.  We will recheck BMP daily and adjust treatment accordingly.      11/06/23 BUN 16 cr 0.98   Appetite is poor  Hydration improved       11/07 resolved

## 2023-11-05 NOTE — ASSESSMENT & PLAN NOTE
11/5/23 - initial potassium 3.3.  She was treated with potassium 40 mEq p.o..  This is likely due to her decreased oral intake.  Encourage p.o. intake.  Daily BMP and adjust treatment accordingly.    11/06/23 potassium is 4.0

## 2023-11-05 NOTE — ASSESSMENT & PLAN NOTE
11/5/23 - we will treat pneumonia with Rocephin and Zithromax.  DuoNebs q.6 hours while awake.  Routine vital sign monitoring wishes to include oxygen saturation.  Oxygen via nasal cannula protocol.      11/06/23 denies dyspnea  , o2 per nc at 2 liters with sat of 97%

## 2023-11-05 NOTE — ASSESSMENT & PLAN NOTE
11/5/23 - EKG shows no STEMI.  Initial high sensitivity troponin noted to be 6.0. Delta troponin noted to be 5.9. We will repeat and adjust treatment accordingly.  Plans to resume home medications.  If patient is taking metoprolol currently, we will hold this medication at this time due to some mild hypotension.      1//06/23 complains of muscle pain when coughing    11/07/23 no complaints

## 2023-11-06 LAB
ANION GAP SERPL CALCULATED.3IONS-SCNC: 13 MMOL/L (ref 7–16)
BASOPHILS # BLD AUTO: 0.01 K/UL (ref 0–0.2)
BASOPHILS NFR BLD AUTO: 0.1 % (ref 0–1)
BUN SERPL-MCNC: 16 MG/DL (ref 7–18)
BUN/CREAT SERPL: 16 (ref 6–20)
CALCIUM SERPL-MCNC: 8.7 MG/DL (ref 8.5–10.1)
CHLORIDE SERPL-SCNC: 102 MMOL/L (ref 98–107)
CO2 SERPL-SCNC: 23 MMOL/L (ref 21–32)
CREAT SERPL-MCNC: 0.98 MG/DL (ref 0.55–1.02)
DIFFERENTIAL METHOD BLD: ABNORMAL
EGFR (NO RACE VARIABLE) (RUSH/TITUS): 68 ML/MIN/1.73M2
EOSINOPHIL # BLD AUTO: 0 K/UL (ref 0–0.5)
EOSINOPHIL NFR BLD AUTO: 0 % (ref 1–4)
ERYTHROCYTE [DISTWIDTH] IN BLOOD BY AUTOMATED COUNT: 13.2 % (ref 11.5–14.5)
GLUCOSE SERPL-MCNC: 117 MG/DL (ref 74–106)
GLUCOSE SERPL-MCNC: 119 MG/DL (ref 70–105)
GLUCOSE SERPL-MCNC: 122 MG/DL (ref 70–105)
GLUCOSE SERPL-MCNC: 127 MG/DL (ref 70–105)
GLUCOSE SERPL-MCNC: 188 MG/DL (ref 70–105)
HCT VFR BLD AUTO: 34.5 % (ref 38–47)
HGB BLD-MCNC: 11 G/DL (ref 12–16)
LYMPHOCYTES # BLD AUTO: 1.05 K/UL (ref 1–4.8)
LYMPHOCYTES NFR BLD AUTO: 6.5 % (ref 27–41)
LYMPHOCYTES NFR BLD MANUAL: 4 % (ref 27–41)
MAGNESIUM SERPL-MCNC: 2.1 MG/DL (ref 1.7–2.3)
MCH RBC QN AUTO: 28.8 PG (ref 27–31)
MCHC RBC AUTO-ENTMCNC: 31.9 G/DL (ref 32–36)
MCV RBC AUTO: 90.3 FL (ref 80–96)
MONOCYTES # BLD AUTO: 0.51 K/UL (ref 0–0.8)
MONOCYTES NFR BLD AUTO: 3.2 % (ref 2–6)
MONOCYTES NFR BLD MANUAL: 1 % (ref 2–6)
MPC BLD CALC-MCNC: 9.8 FL (ref 9.4–12.4)
NEUTROPHILS # BLD AUTO: 14.55 K/UL (ref 1.8–7.7)
NEUTROPHILS NFR BLD AUTO: 90.2 % (ref 53–65)
NEUTS BAND NFR BLD MANUAL: 12 % (ref 1–5)
NEUTS SEG NFR BLD MANUAL: 83 % (ref 50–62)
NRBC BLD MANUAL-RTO: ABNORMAL %
PLATELET # BLD AUTO: 242 K/UL (ref 150–400)
PLATELET MORPHOLOGY: NORMAL
POTASSIUM SERPL-SCNC: 4 MMOL/L (ref 3.5–5.1)
RBC # BLD AUTO: 3.82 M/UL (ref 4.2–5.4)
RBC MORPH BLD: NORMAL
SARS-COV-2 RDRP RESP QL NAA+PROBE: NEGATIVE
SODIUM SERPL-SCNC: 134 MMOL/L (ref 136–145)
WBC # BLD AUTO: 16.12 K/UL (ref 4.5–11)

## 2023-11-06 PROCEDURE — 25000003 PHARM REV CODE 250

## 2023-11-06 PROCEDURE — 94761 N-INVAS EAR/PLS OXIMETRY MLT: CPT

## 2023-11-06 PROCEDURE — 85025 COMPLETE CBC W/AUTO DIFF WBC: CPT

## 2023-11-06 PROCEDURE — 25000003 PHARM REV CODE 250: Performed by: NURSE PRACTITIONER

## 2023-11-06 PROCEDURE — 94640 AIRWAY INHALATION TREATMENT: CPT

## 2023-11-06 PROCEDURE — 96372 THER/PROPH/DIAG INJ SC/IM: CPT

## 2023-11-06 PROCEDURE — 82962 GLUCOSE BLOOD TEST: CPT

## 2023-11-06 PROCEDURE — 63600175 PHARM REV CODE 636 W HCPCS

## 2023-11-06 PROCEDURE — G0378 HOSPITAL OBSERVATION PER HR: HCPCS

## 2023-11-06 PROCEDURE — 99222 1ST HOSP IP/OBS MODERATE 55: CPT | Mod: ,,, | Performed by: NURSE PRACTITIONER

## 2023-11-06 PROCEDURE — 87635 SARS-COV-2 COVID-19 AMP PRB: CPT | Performed by: NURSE PRACTITIONER

## 2023-11-06 PROCEDURE — 96361 HYDRATE IV INFUSION ADD-ON: CPT

## 2023-11-06 PROCEDURE — 96366 THER/PROPH/DIAG IV INF ADDON: CPT

## 2023-11-06 PROCEDURE — 63600175 PHARM REV CODE 636 W HCPCS: Performed by: NURSE PRACTITIONER

## 2023-11-06 PROCEDURE — 83735 ASSAY OF MAGNESIUM: CPT

## 2023-11-06 PROCEDURE — 80048 BASIC METABOLIC PNL TOTAL CA: CPT

## 2023-11-06 PROCEDURE — 27000221 HC OXYGEN, UP TO 24 HOURS

## 2023-11-06 PROCEDURE — 25000242 PHARM REV CODE 250 ALT 637 W/ HCPCS

## 2023-11-06 PROCEDURE — 99222 PR INITIAL HOSPITAL CARE,LEVL II: ICD-10-PCS | Mod: ,,, | Performed by: NURSE PRACTITIONER

## 2023-11-06 PROCEDURE — 99900035 HC TECH TIME PER 15 MIN (STAT)

## 2023-11-06 RX ORDER — SODIUM CHLORIDE 9 MG/ML
INJECTION, SOLUTION INTRAVENOUS CONTINUOUS
Status: DISCONTINUED | OUTPATIENT
Start: 2023-11-06 | End: 2023-11-07 | Stop reason: HOSPADM

## 2023-11-06 RX ADMIN — ATORVASTATIN CALCIUM 80 MG: 40 TABLET, FILM COATED ORAL at 09:11

## 2023-11-06 RX ADMIN — ENOXAPARIN SODIUM 30 MG: 30 INJECTION SUBCUTANEOUS at 05:11

## 2023-11-06 RX ADMIN — IPRATROPIUM BROMIDE AND ALBUTEROL SULFATE 3 ML: 2.5; .5 SOLUTION RESPIRATORY (INHALATION) at 03:11

## 2023-11-06 RX ADMIN — CEFTRIAXONE SODIUM 1 G: 1 INJECTION, POWDER, FOR SOLUTION INTRAMUSCULAR; INTRAVENOUS at 03:11

## 2023-11-06 RX ADMIN — ASPIRIN 81 MG: 81 TABLET, COATED ORAL at 09:11

## 2023-11-06 RX ADMIN — IPRATROPIUM BROMIDE AND ALBUTEROL SULFATE 3 ML: 2.5; .5 SOLUTION RESPIRATORY (INHALATION) at 08:11

## 2023-11-06 RX ADMIN — CLOPIDOGREL BISULFATE 75 MG: 75 TABLET ORAL at 09:11

## 2023-11-06 RX ADMIN — ACETAMINOPHEN 650 MG: 325 TABLET ORAL at 03:11

## 2023-11-06 RX ADMIN — SODIUM CHLORIDE: 9 INJECTION, SOLUTION INTRAVENOUS at 01:11

## 2023-11-06 RX ADMIN — SODIUM CHLORIDE: 9 INJECTION, SOLUTION INTRAVENOUS at 03:11

## 2023-11-06 RX ADMIN — IPRATROPIUM BROMIDE AND ALBUTEROL SULFATE 3 ML: 2.5; .5 SOLUTION RESPIRATORY (INHALATION) at 07:11

## 2023-11-06 RX ADMIN — AZITHROMYCIN MONOHYDRATE 500 MG: 500 INJECTION, POWDER, LYOPHILIZED, FOR SOLUTION INTRAVENOUS at 04:11

## 2023-11-06 NOTE — ASSESSMENT & PLAN NOTE
11/5/23 - blood cultures x2 have been sent and are pending.  We will treat with Rocephin 1 g and Zithromax 500mg daily until cultures are resulted and adjust treatment accordingly.  DuoNebs q.6 hours while awake.  We will also monitor oxygen saturation with routine vital sign monitoring for any changes.  We will monitor CBC daily to trend elevated white count and bandemia.  Oxygen via nasal cannula protocol.      11/06/23 WBC 16.12  Continue rocephin and azithromycin, continue duonebs and supplemental o2  Chest xray shows bilateral opacities

## 2023-11-06 NOTE — PLAN OF CARE
Problem: Adult Inpatient Plan of Care  Goal: Plan of Care Review  11/6/2023 0426 by Kerri Grant RN  Outcome: Ongoing, Progressing  11/6/2023 0420 by Kerri Grant RN  Outcome: Ongoing, Progressing  Flowsheets (Taken 11/6/2023 0420)  Plan of Care Reviewed With: patient  11/6/2023 0419 by Kerri Grant RN  Outcome: Ongoing, Progressing  Flowsheets (Taken 11/6/2023 0419)  Plan of Care Reviewed With: patient  Goal: Patient-Specific Goal (Individualized)  11/6/2023 0426 by Kerri Grant RN  Outcome: Ongoing, Progressing  11/6/2023 0420 by Kerri Grant RN  Outcome: Ongoing, Progressing  11/6/2023 0419 by Kerri Grant RN  Outcome: Ongoing, Progressing  Goal: Absence of Hospital-Acquired Illness or Injury  11/6/2023 0426 by Kerri Grant RN  Outcome: Ongoing, Progressing  11/6/2023 0420 by Kerri Grant RN  Outcome: Ongoing, Progressing  11/6/2023 0419 by Kerri Grant RN  Outcome: Ongoing, Progressing  Intervention: Identify and Manage Fall Risk  11/6/2023 0420 by Kerri Grant RN  Flowsheets (Taken 11/6/2023 0420)  Safety Promotion/Fall Prevention:   assistive device/personal item within reach   bed alarm set   diversional activities provided   Fall Risk reviewed with patient/family   high risk medications identified   nonskid shoes/socks when out of bed   room near unit station   side rails raised x 2   instructed to call staff for mobility  11/6/2023 0419 by Kerri Grant RN  Flowsheets (Taken 11/6/2023 0419)  Safety Promotion/Fall Prevention:   assistive device/personal item within reach   bed alarm set   Fall Risk reviewed with patient/family   family to remain at bedside   medications reviewed   lighting adjusted   nonskid shoes/socks when out of bed   instructed to call staff for mobility   side rails raised x 2   room near unit station  Intervention: Prevent Skin Injury  Flowsheets (Taken 11/6/2023 0420)  Body Position:   left   turned   right   position  changed independently   neutral body alignment   neutral head position   weight shifting  Skin Protection:   incontinence pads utilized   transparent dressing maintained  Intervention: Prevent and Manage VTE (Venous Thromboembolism) Risk  Flowsheets (Taken 11/6/2023 0420)  Activity Management:   Ambulated to bathroom - L4   Standing - L3   Heel slide - L1   Arm raise - L1  VTE Prevention/Management:   dorsiflexion/plantar flexion performed   fluids promoted   ambulation promoted   intravenous hydration   bleeding precations maintained  Range of Motion:   active ROM (range of motion) encouraged   ROM (range of motion) performed  Intervention: Prevent Infection  Flowsheets (Taken 11/6/2023 0420)  Infection Prevention:   equipment surfaces disinfected   hand hygiene promoted   personal protective equipment utilized   rest/sleep promoted   single patient room provided  Goal: Optimal Comfort and Wellbeing  11/6/2023 0426 by Kerri Grant RN  Outcome: Ongoing, Progressing  11/6/2023 0420 by Kerri Grant RN  Outcome: Ongoing, Progressing  11/6/2023 0419 by Kerri Grant RN  Outcome: Ongoing, Progressing  Intervention: Monitor Pain and Promote Comfort  Flowsheets (Taken 11/6/2023 0420)  Pain Management Interventions:   diversional activity provided   care clustered   pillow support provided   position adjusted   relaxation techniques promoted  Intervention: Provide Person-Centered Care  Flowsheets (Taken 11/6/2023 0420)  Trust Relationship/Rapport:   care explained   empathic listening provided   questions answered   questions encouraged   thoughts/feelings acknowledged  Goal: Readiness for Transition of Care  11/6/2023 0426 by Kerri Grant RN  Outcome: Ongoing, Progressing  11/6/2023 0420 by Kerri Grant RN  Outcome: Ongoing, Progressing  11/6/2023 0419 by Kerri Grant RN  Outcome: Ongoing, Progressing     Problem: Diabetes Comorbidity  Goal: Blood Glucose Level Within Targeted  Range  11/6/2023 0426 by Kerri Grant RN  Outcome: Ongoing, Progressing  11/6/2023 0420 by Kerri Grant RN  Outcome: Ongoing, Progressing  11/6/2023 0419 by Kerri Grant RN  Outcome: Ongoing, Progressing  Intervention: Monitor and Manage Glycemia  Flowsheets (Taken 11/6/2023 0420)  Glycemic Management:   blood glucose monitored   oral hydration promoted     Problem: Fluid Imbalance (Pneumonia)  Goal: Fluid Balance  11/6/2023 0426 by Kerri Grant RN  Outcome: Ongoing, Progressing  11/6/2023 0420 by Kerri Grant RN  Outcome: Ongoing, Progressing  11/6/2023 0419 by Kerri Grant RN  Outcome: Ongoing, Progressing  Intervention: Monitor and Manage Fluid Balance  Flowsheets (Taken 11/6/2023 0420)  Fluid/Electrolyte Management:   fluids adjusted   oral rehydration therapy initiated     Problem: Infection (Pneumonia)  Goal: Resolution of Infection Signs and Symptoms  11/6/2023 0426 by Kerri Grant RN  Outcome: Ongoing, Progressing  11/6/2023 0420 by Kerri Grant RN  Outcome: Ongoing, Progressing  11/6/2023 0419 by Kerri Grant RN  Outcome: Ongoing, Progressing  Intervention: Prevent Infection Progression  Flowsheets (Taken 11/6/2023 0420)  Infection Management: aseptic technique maintained     Problem: Respiratory Compromise (Pneumonia)  Goal: Effective Oxygenation and Ventilation  11/6/2023 0426 by Kerri Grant RN  Outcome: Ongoing, Progressing  11/6/2023 0420 by Kerri Grant RN  Outcome: Ongoing, Progressing  11/6/2023 0419 by Kerri Grant RN  Outcome: Ongoing, Progressing     Problem: Fall Injury Risk  Goal: Absence of Fall and Fall-Related Injury  11/6/2023 0426 by Kerri Grant RN  Outcome: Ongoing, Progressing  11/6/2023 0420 by Kerri Grant RN  Outcome: Ongoing, Progressing  11/6/2023 0419 by Kerri Grant RN  Outcome: Ongoing, Progressing     Problem: Infection  Goal: Absence of Infection Signs and Symptoms  11/6/2023 0426 by  Kerri Grant, RN  Outcome: Ongoing, Progressing  11/6/2023 0420 by Kerri Grant, RN  Outcome: Ongoing, Progressing  11/6/2023 0419 by Kerri Grant, RN  Outcome: Ongoing, Progressing     Problem: Adjustment to Illness (Sepsis/Septic Shock)  Goal: Optimal Coping  11/6/2023 0426 by Kerri Grant, RN  Outcome: Ongoing, Progressing  11/6/2023 0420 by Kerri Grant, RN  Outcome: Ongoing, Progressing  11/6/2023 0419 by Kerri Grant, RN  Outcome: Ongoing, Progressing     Problem: Bleeding (Sepsis/Septic Shock)  Goal: Absence of Bleeding  11/6/2023 0426 by Kerri Grant, RN  Outcome: Ongoing, Progressing  11/6/2023 0420 by Kerri Grant, RN  Outcome: Ongoing, Progressing  11/6/2023 0419 by Kerri Grant, RN  Outcome: Ongoing, Progressing     Problem: Glycemic Control Impaired (Sepsis/Septic Shock)  Goal: Blood Glucose Level Within Desired Range  11/6/2023 0426 by Kerri Grant, RN  Outcome: Ongoing, Progressing  11/6/2023 0420 by Kerri Grant, RN  Outcome: Ongoing, Progressing  11/6/2023 0419 by Kerri Grant, RN  Outcome: Ongoing, Progressing     Problem: Infection Progression (Sepsis/Septic Shock)  Goal: Absence of Infection Signs and Symptoms  11/6/2023 0426 by Kerri Grant, RN  Outcome: Ongoing, Progressing  11/6/2023 0420 by Kerri Grant, RN  Outcome: Ongoing, Progressing  11/6/2023 0419 by Kerri Grant, RN  Outcome: Ongoing, Progressing     Problem: Nutrition Impaired (Sepsis/Septic Shock)  Goal: Optimal Nutrition Intake  11/6/2023 0426 by Kerri Grant, RN  Outcome: Ongoing, Progressing  11/6/2023 0420 by Kerri Grant, RN  Outcome: Ongoing, Progressing  11/6/2023 0419 by Kerri Grant, RN  Outcome: Ongoing, Progressing     Problem: Breathing Pattern Ineffective  Goal: Effective Breathing Pattern  11/6/2023 0426 by Kerri Grant, RN  Outcome: Ongoing, Progressing  11/6/2023 0420 by Kerri Grant, RN  Outcome: Ongoing,  Progressing  11/6/2023 0419 by Kerri Grant RN  Outcome: Ongoing, Progressing     Problem: Gas Exchange Impaired  Goal: Optimal Gas Exchange  11/6/2023 0426 by Kerri Grant, RN  Outcome: Ongoing, Progressing  11/6/2023 0420 by Kerri Grant, RN  Outcome: Ongoing, Progressing  11/6/2023 0419 by Kerri Grant, RN  Outcome: Ongoing, Progressing     Problem: Violence Risk or Actual  Goal: Anger and Impulse Control  11/6/2023 0426 by Kerri Grant, RN  Outcome: Ongoing, Progressing  11/6/2023 0420 by Kerri Grant RN  Outcome: Ongoing, Progressing  11/6/2023 0419 by Kerri Grant RN  Outcome: Ongoing, Progressing

## 2023-11-06 NOTE — H&P
[facility] - Medical Surgical Unit  Hospital Medicine  History & Physical    Patient Name: Johana Higgins  MRN: [id number]  Patient Class: OP- Observation  Admission Date: 11/5/2023  Attending Physician: Collins Caban IV, DO   Primary Care Provider: Collins Caban IV, DO         Patient information was obtained from past medical records and ER records.     Subjective:     Principal Problem:Pneumonia of left lung due to infectious organism    Chief Complaint:   Chief Complaint   Patient presents with    Otalgia    Nausea    Diarrhea     Patient coems in with complaints of ear pain, nausea , cough, and diarrhea. Denies chest pain , but states this is similar to how her heart attack felt in 2022. Symptoms started Friday night at about 8 pm    Pneumonia        HPI: 11/5/23 patient admitted to [facility] for pneumonia, dehydration, hyponatremia, chest pain, hypokalemia, hypomagnesemia, dyspnea, and elevated creatinine.  She presented to the emergency department for myalgias, decreased appetite, left-sided chest pain, shortness of breath over the past 2-3 days.  She had an extensive workup which included EKG that showed sinus tachycardia, negative high sensitivity troponin, white blood cell count 21, bandemia of 19, chest x-ray which showed bilateral interstitial opacities, elevated creatinine with decreased GFR, and various electrolyte abnormalities.  She was hydrated with normal saline, Tylenol for the myalgias, aspirin per chest pain protocol, Rocephin 1 g, magnesium 2 g IV, and 40 mEq of potassium p.o..  She responded well to treatment and reports that she does feel better.  Blood cultures were sent for further evaluation.  She was noted to be mildly hypotensive but reports that her blood pressure does usually run on the low side.  Lactic acid was normal in the emergency department.  She was admitted to observation for IV rehydration and antibiotic therapy.      No new subjective &  objective note has been filed under this hospital service since the last note was generated.    Assessment/Plan:     * Pneumonia of left lung due to infectious organism  11/5/23 - blood cultures x2 have been sent and are pending.  We will treat with Rocephin 1 g and Zithromax 500mg daily until cultures are resulted and adjust treatment accordingly.  DuoNebs q.6 hours while awake.  We will also monitor oxygen saturation with routine vital sign monitoring for any changes.  We will monitor CBC daily to trend elevated white count and bandemia.  Oxygen via nasal cannula protocol.      11/06/23 WBC 16.12  Continue rocephin and azithromycin, continue duonebs and supplemental o2  Chest xray shows bilateral opacities    Dyspnea  11/5/23 - we will treat pneumonia with Rocephin and Zithromax.  DuoNebs q.6 hours while awake.  Routine vital sign monitoring wishes to include oxygen saturation.  Oxygen via nasal cannula protocol.      11/06/23 denies dyspnea  , o2 per nc at 2 liters with sat of 97%    Chest pain  11/5/23 - EKG shows no STEMI.  Initial high sensitivity troponin noted to be 6.0. Delta troponin noted to be 5.9. We will repeat and adjust treatment accordingly.  Plans to resume home medications.  If patient is taking metoprolol currently, we will hold this medication at this time due to some mild hypotension.      1//06/23 complains of muscle pain when coughing    Hyponatremia  Patient has hyponatremia. We will monitor sodium Daily. The hyponatremia is due to Dehydration/hypovolemia. Patient did receive IV NS while in the ED. We will treat the hyponatremia with IV fluids as follows: NS at 100 ml/hr. The patient's sodium results have been reviewed and are listed below.  Recent Labs   Lab 11/05/23  1017   *       11/06/23 NA has improved to 134    Hypomagnesemia  Patient has Abnormal Magnesium: hypomagnesemia. Will continue to monitor electrolytes closely. Patient did receive Magnesium 2g IV while in the ED. Will  replace the affected electrolytes and repeat labs to be done after interventions completed. The patient's magnesium results have been reviewed and are listed below.  Recent Labs   Lab 11/05/23  1017   MG 1.1*        Hypokalemia  11/5/23 - initial potassium 3.3.  She was treated with potassium 40 mEq p.o..  This is likely due to her decreased oral intake.  Encourage p.o. intake.  Daily BMP and adjust treatment accordingly.    11/06/23 potassium is 4.0    Dehydration  11/5/23 - IV rehydration with normal saline at 125 mL/hour.  Patient has been experiencing decreased appetite but we will encourage p.o. intake.  We will recheck BMP daily and adjust treatment accordingly.      11/06/23 BUN 16 cr 0.98   Appetite is poor  Hydration improved         VTE Risk Mitigation (From admission, onward)         Ordered     enoxaparin injection 30 mg  Daily         11/05/23 1422     Place sequential compression device  Until discontinued         11/05/23 1422     Place LEVI hose  Until discontinued         11/05/23 1422     IP VTE HIGH RISK PATIENT  Once         11/05/23 1422                             DIVYA Carr  Department of Hospital Medicine  [facility] - Medical Surgical Unit

## 2023-11-06 NOTE — PLAN OF CARE
[facility] - Medical Surgical Unit  Initial Discharge Assessment       Primary Care Provider: Collins Caban IV, DO    Admission Diagnosis: Dehydration [E86.0]  Hypokalemia [E87.6]  Hypomagnesemia [E83.42]  Hyponatremia [E87.1]  Fatigue [R53.83]  Pneumonia of left lung due to infectious organism, unspecified part of lung [J18.9]  Chest pain, unspecified type [R07.9]    Admission Date: 11/5/2023  Expected Discharge Date:     Transition of Care Barriers: (P) None    Payor: /     Extended Emergency Contact Information  Primary Emergency Contact: Cesia Higgins  Mobile Phone: [phone]  Relation: Spouse  Preferred language: English   needed? No    Discharge Plan A: (P) Home with family         [facility] [address] [address]  [address] [id number]  Phone: [phone] Fax: [phone]    [facility] [place] [address]  Phone: [phone] Fax: [phone]      Initial Assessment (most recent)       Adult Discharge Assessment - 11/06/23 0921          Discharge Assessment    Assessment Type Discharge Planning Assessment (P)      Confirmed/corrected address, phone number and insurance Yes (P)      Confirmed Demographics Correct on Facesheet (P)      Source of Information patient (P)      Communicated MACIEL with patient/caregiver Date not available/Unable to determine (P)      Reason For Admission Pneumonia (P)      People in Home spouse;child(braydon), adult (P)      Do you expect to return to your current living situation? Yes (P)      Do you have help at home or someone to help you manage your care at home? Yes (P)      Who are your caregiver(s) and their phone number(s)? Cesia Higgins [phone] (P)      Current cognitive status: Alert/Oriented (P)      Equipment Currently Used at Home none (P)      Patient currently being followed by outpatient case management? No (P)      Do you currently have  service(s) that help you manage your care at home? No (P)      Do you take prescription medications? No (P)      Do you have prescription coverage? No (P)      Who is going to help you get home at discharge? Cesia Higgins (P)      How do you get to doctors appointments? car, drives self (P)      Are you on dialysis? No (P)      Do you take coumadin? No (P)      DME Needed Upon Discharge  none (P)      Discharge Plan discussed with: Patient (P)      Transition of Care Barriers None (P)      Discharge Plan A Home with family (P)                       Ms. Higgins lives at home with her  and sometimes daughter.  She hasn't required any DME or home health services.  She intends to discharge home with her .

## 2023-11-06 NOTE — SUBJECTIVE & OBJECTIVE
Past Medical History:   Diagnosis Date    Coronary artery disease     Diabetes mellitus     Hyperlipidemia     Hypertension        Past Surgical History:   Procedure Laterality Date    ANGIOGRAM, CORONARY, WITH LEFT HEART CATHETERIZATION N/A 12/6/2022    Procedure: Angiogram, Coronary, with Left Heart Cath;  Surgeon: Dennys Barakat MD;  Location: [facility] CATH LAB;  Service: Cardiology;  Laterality: N/A;       Review of patient's allergies indicates:   Allergen Reactions    Penicillin Hives    Codeine Nausea And Vomiting       No current facility-administered medications on file prior to encounter.     Current Outpatient Medications on File Prior to Encounter   Medication Sig    aspirin (ECOTRIN) 81 MG EC tablet Take 1 tablet (81 mg total) by mouth once daily.    atorvastatin (LIPITOR) 80 MG tablet Take 1 tablet (80 mg total) by mouth once daily.    clopidogreL (PLAVIX) 75 mg tablet Take 1 tablet (75 mg total) by mouth once daily.    metFORMIN (GLUCOPHAGE) 500 MG tablet Take 1 tablet (500 mg total) by mouth 2 (two) times daily with meals.    metoprolol succinate (TOPROL-XL) 25 MG 24 hr tablet Take 1 tablet (25 mg total) by mouth once daily.    levETIRAcetam (KEPPRA) 500 MG Tab Take 1 tablet (500 mg total) by mouth 2 (two) times daily. (Patient not taking: Reported on 3/30/2023)     Family History       Problem Relation (Age of Onset)    Heart attack Father, Brother, Maternal Grandfather    Heart disease Paternal Grandfather          Tobacco Use    Smoking status: Every Day     Types: Cigarettes    Smokeless tobacco: Not on file   Substance and Sexual Activity    Alcohol use: Never    Drug use: Yes     Types: Marijuana    Sexual activity: Yes     Review of Systems   Constitutional:  Positive for appetite change.   Respiratory:  Positive for cough and shortness of breath.         On 2 liters per nc with sat of 97%   Gastrointestinal:  Negative for abdominal distention, abdominal pain, constipation, diarrhea,  nausea and vomiting.        States was having diarrhea on admission but that is has resolved   Genitourinary:  Negative for difficulty urinating and dysuria.   Musculoskeletal:  Negative for arthralgias.   Skin:  Negative for wound.   Neurological:  Positive for weakness.     Objective:     Vital Signs (Most Recent):  Temp: 99.9 °F (37.7 °C) (11/06/23 0400)  Pulse: 90 (11/06/23 0829)  Resp: 18 (11/06/23 0829)  BP: 98/66 (11/06/23 0400)  SpO2: 96 % (11/06/23 0829) Vital Signs (24h Range):  Temp:  [97.7 °F (36.5 °C)-100.7 °F (38.2 °C)] 99.9 °F (37.7 °C)  Pulse:  [] 90  Resp:  [18-29] 18  SpO2:  [88 %-100 %] 96 %  BP: ()/(52-66) 98/66     Weight: 59.9 kg (132 lb)  Body mass index is 23.38 kg/m².     Physical Exam  HENT:      Head: Normocephalic.      Mouth/Throat:      Mouth: Mucous membranes are moist.   Cardiovascular:      Rate and Rhythm: Normal rate and regular rhythm.      Pulses: Normal pulses.      Heart sounds: Normal heart sounds.   Pulmonary:      Breath sounds: Rhonchi present.      Comments: 02 per nc at 2  liters is 97%  Abdominal:      General: Bowel sounds are normal. There is no distension.      Palpations: Abdomen is soft. There is no mass.      Tenderness: There is no abdominal tenderness. There is no guarding or rebound.      Hernia: No hernia is present.   Musculoskeletal:         General: Normal range of motion.   Skin:     General: Skin is warm and dry.   Neurological:      Mental Status: She is alert and oriented to person, place, and time.   Psychiatric:         Mood and Affect: Mood normal.                Significant Labs: All pertinent labs within the past 24 hours have been reviewed.  Recent Lab Results  (Last 5 results in the past 24 hours)        11/06/23  0607   11/06/23  0605   11/05/23  2126   11/05/23  1934   11/05/23  1611        Anion Gap   13             Appearance, UA       Slightly Cloudy         Bacteria, UA       Occasional         Bands   12             Baso #    0.01             Basophil %   0.1             Bilirubin (UA)       Negative         BUN   16             BUN/CREAT RATIO   16             Calcium   8.7             Chloride   102             CO2   23             Color, UA       Yellow         Creatinine   0.98             Differential Method   Manual             eGFR   68             Eos #   0.00             Eosinophil %   0.0             Glucose   117             Glucose, UA       250         Hematocrit   34.5             Hemoglobin   11.0             Ketones, UA       Negative         Leukocytes, UA       Negative         Lymph #   1.05             Lymph %   6.5                4             Magnesium    2.1             MCH   28.8             MCHC   31.9             MCV   90.3             Mono #   0.51             Mono %   3.2                1             MPV   9.8             Mucus, UA       None Seen         Neutrophils, Abs   14.55             Neutrophils Relative   90.2             NITRITE UA       Negative         nRBC               Occult Blood UA       Small         pH, UA       5.5         PLATELET MORPHOLOGY   Normal             Platelet Count   242             POC Glucose 119     116     325       Potassium   4.0             Protein, UA       30         RBC   3.82             RBC Morphology   Normal             RBC, UA       0-3         RDW   13.2             Segmented Neutrophils, Man %   83             Sodium   134             Specific [place], UA       1.015         Squam Epithel, UA       Occasional         Troponin I High Sensitivity               UROBILINOGEN UA       0.2         WBC, UA       None Seen         WBC   16.12             Yeast, UA       None Seen                                Significant Imaging: I have reviewed all pertinent imaging results/findings within the past 24 hours.

## 2023-11-06 NOTE — H&P
[facility] - Medical Surgical Unit  Hospital Medicine  History & Physical    Patient Name: Johana Higgins  MRN: [id number]  Patient Class: OP- Observation  Admission Date: 11/5/2023  Attending Physician: Collins Caban IV, DO   Primary Care Provider: Collins Caban IV, DO         Patient information was obtained from patient, past medical records and ER records.     Subjective:     Principal Problem:Pneumonia of left lung due to infectious organism    Chief Complaint:   Chief Complaint   Patient presents with    Otalgia    Nausea    Diarrhea     Patient coems in with complaints of ear pain, nausea , cough, and diarrhea. Denies chest pain , but states this is similar to how her heart attack felt in 2022. Symptoms started Friday night at about 8 pm    Pneumonia        HPI: 11/5/23 patient admitted to [facility] for pneumonia, dehydration, hyponatremia, chest pain, hypokalemia, hypomagnesemia, dyspnea, and elevated creatinine.  She presented to the emergency department for myalgias, decreased appetite, left-sided chest pain, shortness of breath over the past 2-3 days.  She had an extensive workup which included EKG that showed sinus tachycardia, negative high sensitivity troponin, white blood cell count 21, bandemia of 19, chest x-ray which showed bilateral interstitial opacities, elevated creatinine with decreased GFR, and various electrolyte abnormalities.  She was hydrated with normal saline, Tylenol for the myalgias, aspirin per chest pain protocol, Rocephin 1 g, magnesium 2 g IV, and 40 mEq of potassium p.o..  She responded well to treatment and reports that she does feel better.  Blood cultures were sent for further evaluation.  She was noted to be mildly hypotensive but reports that her blood pressure does usually run on the low side.  Lactic acid was normal in the emergency department.  She was admitted to observation for IV rehydration and antibiotic therapy.      Past Medical  History:   Diagnosis Date    Coronary artery disease     Diabetes mellitus     Hyperlipidemia     Hypertension        Past Surgical History:   Procedure Laterality Date    ANGIOGRAM, CORONARY, WITH LEFT HEART CATHETERIZATION N/A 12/6/2022    Procedure: Angiogram, Coronary, with Left Heart Cath;  Surgeon: Dennys Barakat MD;  Location: [facility] CATH LAB;  Service: Cardiology;  Laterality: N/A;       Review of patient's allergies indicates:   Allergen Reactions    Penicillin Hives    Codeine Nausea And Vomiting       No current facility-administered medications on file prior to encounter.     Current Outpatient Medications on File Prior to Encounter   Medication Sig    aspirin (ECOTRIN) 81 MG EC tablet Take 1 tablet (81 mg total) by mouth once daily.    atorvastatin (LIPITOR) 80 MG tablet Take 1 tablet (80 mg total) by mouth once daily.    clopidogreL (PLAVIX) 75 mg tablet Take 1 tablet (75 mg total) by mouth once daily.    metFORMIN (GLUCOPHAGE) 500 MG tablet Take 1 tablet (500 mg total) by mouth 2 (two) times daily with meals.    metoprolol succinate (TOPROL-XL) 25 MG 24 hr tablet Take 1 tablet (25 mg total) by mouth once daily.    levETIRAcetam (KEPPRA) 500 MG Tab Take 1 tablet (500 mg total) by mouth 2 (two) times daily. (Patient not taking: Reported on 3/30/2023)     Family History       Problem Relation (Age of Onset)    Heart attack Father, Brother, Maternal Grandfather    Heart disease Paternal Grandfather          Tobacco Use    Smoking status: Every Day     Types: Cigarettes    Smokeless tobacco: Not on file   Substance and Sexual Activity    Alcohol use: Never    Drug use: Yes     Types: Marijuana    Sexual activity: Yes     Review of Systems   Constitutional:  Positive for appetite change.   Respiratory:  Positive for cough and shortness of breath.         On 2 liters per nc with sat of 97%   Gastrointestinal:  Negative for abdominal distention, abdominal pain, constipation,  diarrhea, nausea and vomiting.        States was having diarrhea on admission but that is has resolved   Genitourinary:  Negative for difficulty urinating and dysuria.   Musculoskeletal:  Negative for arthralgias.   Skin:  Negative for wound.   Neurological:  Positive for weakness.     Objective:     Vital Signs (Most Recent):  Temp: 99.9 °F (37.7 °C) (11/06/23 0400)  Pulse: 90 (11/06/23 0829)  Resp: 18 (11/06/23 0829)  BP: 98/66 (11/06/23 0400)  SpO2: 96 % (11/06/23 0829) Vital Signs (24h Range):  Temp:  [97.7 °F (36.5 °C)-100.7 °F (38.2 °C)] 99.9 °F (37.7 °C)  Pulse:  [] 90  Resp:  [18-29] 18  SpO2:  [88 %-100 %] 96 %  BP: ()/(52-66) 98/66     Weight: 59.9 kg (132 lb)  Body mass index is 23.38 kg/m².     Physical Exam  HENT:      Head: Normocephalic.      Mouth/Throat:      Mouth: Mucous membranes are moist.   Cardiovascular:      Rate and Rhythm: Normal rate and regular rhythm.      Pulses: Normal pulses.      Heart sounds: Normal heart sounds.   Pulmonary:      Breath sounds: Rhonchi present.      Comments: 02 per nc at 2  liters is 97%  Abdominal:      General: Bowel sounds are normal. There is no distension.      Palpations: Abdomen is soft. There is no mass.      Tenderness: There is no abdominal tenderness. There is no guarding or rebound.      Hernia: No hernia is present.   Musculoskeletal:         General: Normal range of motion.   Skin:     General: Skin is warm and dry.   Neurological:      Mental Status: She is alert and oriented to person, place, and time.   Psychiatric:         Mood and Affect: Mood normal.                Significant Labs: All pertinent labs within the past 24 hours have been reviewed.  Recent Lab Results  (Last 5 results in the past 24 hours)        11/06/23  0607   11/06/23  0605   11/05/23  2126   11/05/23  1934   11/05/23  1611        Anion Gap   13             Appearance, UA       Slightly Cloudy         Bacteria, UA       Occasional         Bands   12              Baso #   0.01             Basophil %   0.1             Bilirubin (UA)       Negative         BUN   16             BUN/CREAT RATIO   16             Calcium   8.7             Chloride   102             CO2   23             Color, UA       Yellow         Creatinine   0.98             Differential Method   Manual             eGFR   68             Eos #   0.00             Eosinophil %   0.0             Glucose   117             Glucose, UA       250         Hematocrit   34.5             Hemoglobin   11.0             Ketones, UA       Negative         Leukocytes, UA       Negative         Lymph #   1.05             Lymph %   6.5                4             Magnesium    2.1             MCH   28.8             MCHC   31.9             MCV   90.3             Mono #   0.51             Mono %   3.2                1             MPV   9.8             Mucus, UA       None Seen         Neutrophils, Abs   14.55             Neutrophils Relative   90.2             NITRITE UA       Negative         nRBC               Occult Blood UA       Small         pH, UA       5.5         PLATELET MORPHOLOGY   Normal             Platelet Count   242             POC Glucose 119     116     325       Potassium   4.0             Protein, UA       30         RBC   3.82             RBC Morphology   Normal             RBC, UA       0-3         RDW   13.2             Segmented Neutrophils, Man %   83             Sodium   134             Specific [place], UA       1.015         Squam Epithel, UA       Occasional         Troponin I High Sensitivity               UROBILINOGEN UA       0.2         WBC, UA       None Seen         WBC   16.12             Yeast, UA       None Seen                                Significant Imaging: I have reviewed all pertinent imaging results/findings within the past 24 hours.    Assessment/Plan:     * Pneumonia of left lung due to infectious organism  11/5/23 - blood cultures x2 have been sent and are pending.  We will treat  with Rocephin 1 g and Zithromax 500mg daily until cultures are resulted and adjust treatment accordingly.  DuoNebs q.6 hours while awake.  We will also monitor oxygen saturation with routine vital sign monitoring for any changes.  We will monitor CBC daily to trend elevated white count and bandemia.  Oxygen via nasal cannula protocol.      11/06/23 WBC 16.12  Continue rocephin and azithromycin, continue duonebs and supplemental o2    Dyspnea  11/5/23 - we will treat pneumonia with Rocephin and Zithromax.  DuoNebs q.6 hours while awake.  Routine vital sign monitoring wishes to include oxygen saturation.  Oxygen via nasal cannula protocol.      11/06/23 denies dyspnea  , o2 per nc at 2 liters with sat of 97%    Chest pain  11/5/23 - EKG shows no STEMI.  Initial high sensitivity troponin noted to be 6.0. Delta troponin noted to be 5.9. We will repeat and adjust treatment accordingly.  Plans to resume home medications.  If patient is taking metoprolol currently, we will hold this medication at this time due to some mild hypotension.      1//06/23 complains of muscle pain when coughing    Hyponatremia  Patient has hyponatremia. We will monitor sodium Daily. The hyponatremia is due to Dehydration/hypovolemia. Patient did receive IV NS while in the ED. We will treat the hyponatremia with IV fluids as follows: NS at 100 ml/hr. The patient's sodium results have been reviewed and are listed below.  Recent Labs   Lab 11/05/23  1017   *       11/06/23 NA has improved to 134    Hypomagnesemia  Patient has Abnormal Magnesium: hypomagnesemia. Will continue to monitor electrolytes closely. Patient did receive Magnesium 2g IV while in the ED. Will replace the affected electrolytes and repeat labs to be done after interventions completed. The patient's magnesium results have been reviewed and are listed below.  Recent Labs   Lab 11/05/23  1017   MG 1.1*        Hypokalemia  11/5/23 - initial potassium 3.3.  She was treated with  potassium 40 mEq p.o..  This is likely due to her decreased oral intake.  Encourage p.o. intake.  Daily BMP and adjust treatment accordingly.    11/06/23 potassium is 4.0    Dehydration  11/5/23 - IV rehydration with normal saline at 125 mL/hour.  Patient has been experiencing decreased appetite but we will encourage p.o. intake.  We will recheck BMP daily and adjust treatment accordingly.      11/06/23 BUN 16 cr 0.98   Appetite is poor  Hydration improved         VTE Risk Mitigation (From admission, onward)         Ordered     enoxaparin injection 30 mg  Daily         11/05/23 1422     Place sequential compression device  Until discontinued         11/05/23 1422     Place LEVI hose  Until discontinued         11/05/23 1422     IP VTE HIGH RISK PATIENT  Once         11/05/23 1422                           DIVYA Carr  Department of Hospital Medicine  [facility] - [facility] Surgical [facility]

## 2023-11-07 VITALS
DIASTOLIC BLOOD PRESSURE: 69 MMHG | TEMPERATURE: 98 F | OXYGEN SATURATION: 99 % | HEART RATE: 92 BPM | SYSTOLIC BLOOD PRESSURE: 121 MMHG | WEIGHT: 132 LBS | BODY MASS INDEX: 23.39 KG/M2 | RESPIRATION RATE: 18 BRPM | HEIGHT: 63 IN

## 2023-11-07 LAB
ANION GAP SERPL CALCULATED.3IONS-SCNC: 11 MMOL/L (ref 7–16)
BASOPHILS # BLD AUTO: 0.02 K/UL (ref 0–0.2)
BASOPHILS NFR BLD AUTO: 0.2 % (ref 0–1)
BUN SERPL-MCNC: 7 MG/DL (ref 7–18)
BUN/CREAT SERPL: 9 (ref 6–20)
CALCIUM SERPL-MCNC: 8.6 MG/DL (ref 8.5–10.1)
CHLORIDE SERPL-SCNC: 105 MMOL/L (ref 98–107)
CO2 SERPL-SCNC: 26 MMOL/L (ref 21–32)
CREAT SERPL-MCNC: 0.79 MG/DL (ref 0.55–1.02)
DIFFERENTIAL METHOD BLD: ABNORMAL
EGFR (NO RACE VARIABLE) (RUSH/TITUS): 88 ML/MIN/1.73M2
EOSINOPHIL # BLD AUTO: 0.07 K/UL (ref 0–0.5)
EOSINOPHIL NFR BLD AUTO: 0.6 % (ref 1–4)
ERYTHROCYTE [DISTWIDTH] IN BLOOD BY AUTOMATED COUNT: 13.3 % (ref 11.5–14.5)
GLUCOSE SERPL-MCNC: 123 MG/DL (ref 70–105)
GLUCOSE SERPL-MCNC: 125 MG/DL (ref 74–106)
HCT VFR BLD AUTO: 32.9 % (ref 38–47)
HGB BLD-MCNC: 10.5 G/DL (ref 12–16)
LYMPHOCYTES # BLD AUTO: 1.39 K/UL (ref 1–4.8)
LYMPHOCYTES NFR BLD AUTO: 12 % (ref 27–41)
MAGNESIUM SERPL-MCNC: 1.8 MG/DL (ref 1.7–2.3)
MCH RBC QN AUTO: 29.2 PG (ref 27–31)
MCHC RBC AUTO-ENTMCNC: 31.9 G/DL (ref 32–36)
MCV RBC AUTO: 91.6 FL (ref 80–96)
MONOCYTES # BLD AUTO: 0.56 K/UL (ref 0–0.8)
MONOCYTES NFR BLD AUTO: 4.8 % (ref 2–6)
MPC BLD CALC-MCNC: 10 FL (ref 9.4–12.4)
NEUTROPHILS # BLD AUTO: 9.52 K/UL (ref 1.8–7.7)
NEUTROPHILS NFR BLD AUTO: 82.4 % (ref 53–65)
PLATELET # BLD AUTO: 233 K/UL (ref 150–400)
POTASSIUM SERPL-SCNC: 3.6 MMOL/L (ref 3.5–5.1)
RBC # BLD AUTO: 3.59 M/UL (ref 4.2–5.4)
SODIUM SERPL-SCNC: 138 MMOL/L (ref 136–145)
WBC # BLD AUTO: 11.56 K/UL (ref 4.5–11)

## 2023-11-07 PROCEDURE — 27000982 HC MATTRESS, MATRIX LAL RENTAL

## 2023-11-07 PROCEDURE — 85025 COMPLETE CBC W/AUTO DIFF WBC: CPT

## 2023-11-07 PROCEDURE — 83735 ASSAY OF MAGNESIUM: CPT

## 2023-11-07 PROCEDURE — 25000003 PHARM REV CODE 250

## 2023-11-07 PROCEDURE — 96361 HYDRATE IV INFUSION ADD-ON: CPT

## 2023-11-07 PROCEDURE — 99238 HOSP IP/OBS DSCHRG MGMT 30/<: CPT | Mod: ,,, | Performed by: NURSE PRACTITIONER

## 2023-11-07 PROCEDURE — 99900035 HC TECH TIME PER 15 MIN (STAT)

## 2023-11-07 PROCEDURE — 80048 BASIC METABOLIC PNL TOTAL CA: CPT

## 2023-11-07 PROCEDURE — 94640 AIRWAY INHALATION TREATMENT: CPT

## 2023-11-07 PROCEDURE — 27000221 HC OXYGEN, UP TO 24 HOURS

## 2023-11-07 PROCEDURE — 82962 GLUCOSE BLOOD TEST: CPT

## 2023-11-07 PROCEDURE — 99238 PR HOSPITAL DISCHARGE DAY,<30 MIN: ICD-10-PCS | Mod: ,,, | Performed by: NURSE PRACTITIONER

## 2023-11-07 PROCEDURE — 25000242 PHARM REV CODE 250 ALT 637 W/ HCPCS

## 2023-11-07 PROCEDURE — 27000944

## 2023-11-07 PROCEDURE — G0378 HOSPITAL OBSERVATION PER HR: HCPCS

## 2023-11-07 PROCEDURE — 94761 N-INVAS EAR/PLS OXIMETRY MLT: CPT

## 2023-11-07 RX ORDER — CEFDINIR 300 MG/1
300 CAPSULE ORAL 2 TIMES DAILY
Qty: 6 CAPSULE | Refills: 0 | Status: SHIPPED | OUTPATIENT
Start: 2023-11-07 | End: 2023-11-10

## 2023-11-07 RX ORDER — ACETAMINOPHEN 325 MG/1
650 TABLET ORAL EVERY 4 HOURS PRN
Refills: 0
Start: 2023-11-07

## 2023-11-07 RX ORDER — AZITHROMYCIN 250 MG/1
250 TABLET, FILM COATED ORAL DAILY
Qty: 3 TABLET | Refills: 0 | Status: SHIPPED | OUTPATIENT
Start: 2023-11-07 | End: 2023-11-10

## 2023-11-07 RX ADMIN — IPRATROPIUM BROMIDE AND ALBUTEROL SULFATE 3 ML: 2.5; .5 SOLUTION RESPIRATORY (INHALATION) at 08:11

## 2023-11-07 RX ADMIN — CLOPIDOGREL BISULFATE 75 MG: 75 TABLET ORAL at 09:11

## 2023-11-07 RX ADMIN — SODIUM CHLORIDE: 9 INJECTION, SOLUTION INTRAVENOUS at 02:11

## 2023-11-07 RX ADMIN — ASPIRIN 81 MG: 81 TABLET, COATED ORAL at 09:11

## 2023-11-07 RX ADMIN — ACETAMINOPHEN 650 MG: 325 TABLET ORAL at 02:11

## 2023-11-07 RX ADMIN — ATORVASTATIN CALCIUM 80 MG: 40 TABLET, FILM COATED ORAL at 09:11

## 2023-11-07 NOTE — HOSPITAL COURSE
11/07/23  Resting in bed. States she feels much better this morning. O2 canula off. States she took it off before she went to bathroom. Sat on room air is 98%. Chest is clear. Talked with her re pcn allergy- states she has never had a reaction that she is aware of. Will dc home on zithromycin and omnicef for 3 more days. She has return appt with Dr. Collins Caban for Thursday @ 1015. Medications reconciled. Meds sent electronically to [facility] per her request.

## 2023-11-07 NOTE — NURSING
IV removed from patient's right forearm intact. Patient tolerated procedure well. Cardiac monitor removed and returned to supply [place]. Discharge information, including prescriptions and upcoming appts discussed with patient and spouse. Both verbalizes understanding and denies any questions at this time. Patient discharges from [facility] in company of spouse to personal residence [id number].

## 2023-11-07 NOTE — PLAN OF CARE
Problem: Adult Inpatient Plan of Care  Goal: Plan of Care Review  Outcome: Met  Goal: Patient-Specific Goal (Individualized)  Outcome: Met  Goal: Absence of Hospital-Acquired Illness or Injury  Outcome: Met  Goal: Optimal Comfort and Wellbeing  Outcome: Met  Goal: Readiness for Transition of Care  Outcome: Met     Problem: Diabetes Comorbidity  Goal: Blood Glucose Level Within Targeted Range  Outcome: Met     Problem: Fluid Imbalance (Pneumonia)  Goal: Fluid Balance  Outcome: Met     Problem: Infection (Pneumonia)  Goal: Resolution of Infection Signs and Symptoms  Outcome: Met     Problem: Respiratory Compromise (Pneumonia)  Goal: Effective Oxygenation and Ventilation  Outcome: Met     Problem: Fall Injury Risk  Goal: Absence of Fall and Fall-Related Injury  Outcome: Met     Problem: Infection  Goal: Absence of Infection Signs and Symptoms  Outcome: Met     Problem: Adjustment to Illness (Sepsis/Septic Shock)  Goal: Optimal Coping  Outcome: Met     Problem: Bleeding (Sepsis/Septic Shock)  Goal: Absence of Bleeding  Outcome: Met     Problem: Glycemic Control Impaired (Sepsis/Septic Shock)  Goal: Blood Glucose Level Within Desired Range  Outcome: Met     Problem: Infection Progression (Sepsis/Septic Shock)  Goal: Absence of Infection Signs and Symptoms  Outcome: Met     Problem: Nutrition Impaired (Sepsis/Septic Shock)  Goal: Optimal Nutrition Intake  Outcome: Met     Problem: Breathing Pattern Ineffective  Goal: Effective Breathing Pattern  Outcome: Met     Problem: Gas Exchange Impaired  Goal: Optimal Gas Exchange  Outcome: Met     Problem: Violence Risk or Actual  Goal: Anger and Impulse Control  Outcome: Met

## 2023-11-07 NOTE — PLAN OF CARE
Problem: Diabetes Comorbidity  Goal: Blood Glucose Level Within Targeted Range  Outcome: Ongoing, Progressing  Intervention: Monitor and Manage Glycemia  Flowsheets (Taken 11/6/2023 1807)  Glycemic Management: blood glucose monitored     Problem: Fluid Imbalance (Pneumonia)  Goal: Fluid Balance  Outcome: Ongoing, Progressing  Intervention: Monitor and Manage Fluid Balance  Flowsheets (Taken 11/6/2023 1807)  Fluid/Electrolyte Management: fluids provided     Problem: Infection (Pneumonia)  Goal: Resolution of Infection Signs and Symptoms  Outcome: Ongoing, Progressing  Intervention: Prevent Infection Progression  Flowsheets (Taken 11/6/2023 1807)  Infection Management: aseptic technique maintained     Problem: Respiratory Compromise (Pneumonia)  Goal: Effective Oxygenation and Ventilation  Outcome: Ongoing, Progressing     Problem: Fall Injury Risk  Goal: Absence of Fall and Fall-Related Injury  11/6/2023 1807 by Hailee Byers RN  Outcome: Ongoing, Progressing  11/6/2023 1330 by Hailee Byers RN  Outcome: Ongoing, Progressing  Intervention: Identify and Manage Contributors  Flowsheets (Taken 11/6/2023 1807)  Self-Care Promotion:   independence encouraged   BADL personal objects within reach   BADL personal routines maintained   meal set-up provided   safe use of adaptive equipment encouraged  Intervention: Promote Injury-Free Environment  Flowsheets (Taken 11/6/2023 1807)  Safety Promotion/Fall Prevention:   assistive device/personal item within reach   bed alarm set   diversional activities provided   Fall Risk reviewed with patient/family   Fall Risk signage in place   lighting adjusted   side rails raised x 2   instructed to call staff for mobility   nonskid shoes/socks when out of bed

## 2023-11-07 NOTE — ASSESSMENT & PLAN NOTE
11/5/23 - blood cultures x2 have been sent and are pending.  We will treat with Rocephin 1 g and Zithromax 500mg daily until cultures are resulted and adjust treatment accordingly.  DuoNebs q.6 hours while awake.  We will also monitor oxygen saturation with routine vital sign monitoring for any changes.  We will monitor CBC daily to trend elevated white count and bandemia.  Oxygen via nasal cannula protocol.      11/06/23 WBC 16.12  Continue rocephin and azithromycin, continue duonebs and supplemental o2  Chest xray shows bilateral opacities      11/07/23 dc home   Continue zithromycin and omnicef x 3 days   Follow up appt with Dr. Collins Caban Thursday 11/09/23 @ [id number]

## 2023-11-07 NOTE — DISCHARGE SUMMARY
[facility]  Hospital Medicine  Discharge Summary      Patient Name: Johana Higgins  MRN: [id number]  FLAQUITO: [id number]  Patient Class: OP- Observation  Admission Date: 11/5/2023  Hospital Length of Stay: 0 days  Discharge Date and Time:  11/07/2023 9:50 AM  Attending Physician: Collins Caban IV, DO   Discharging Provider: DIVYA Carr  Primary Care Provider: Collins Caban IV, DO    Primary Care Team: Networked reference to record PCT     HPI:   11/5/23 patient admitted to [facility] for pneumonia, dehydration, hyponatremia, chest pain, hypokalemia, hypomagnesemia, dyspnea, and elevated creatinine.  She presented to the emergency department for myalgias, decreased appetite, left-sided chest pain, shortness of breath over the past 2-3 days.  She had an extensive workup which included EKG that showed sinus tachycardia, negative high sensitivity troponin, white blood cell count 21, bandemia of 19, chest x-ray which showed bilateral interstitial opacities, elevated creatinine with decreased GFR, and various electrolyte abnormalities.  She was hydrated with normal saline, Tylenol for the myalgias, aspirin per chest pain protocol, Rocephin 1 g, magnesium 2 g IV, and 40 mEq of potassium p.o..  She responded well to treatment and reports that she does feel better.  Blood cultures were sent for further evaluation.  She was noted to be mildly hypotensive but reports that her blood pressure does usually run on the low side.  Lactic acid was normal in the emergency department.  She was admitted to observation for IV rehydration and antibiotic therapy.      * No surgery found *      Hospital Course:   11/07/23  Resting in bed. States she feels much better this morning. O2 canula off. States she took it off before she went to bathroom. Sat on room air is 98%. Chest is clear. Talked with her re pcn allergy- states she has never had a reaction that she is aware of. Will  dc home on zithromycin and omnicef for 3 more days. She has return appt with Dr. Collins Caban for Thursday @ 1015. Medications reconciled. Meds sent electronically to [facility] per her request.       Goals of Care Treatment Preferences:  Code Status: Full Code      Consults:     Pulmonary  * Pneumonia of both lungs due to infectious organism  11/5/23 - blood cultures x2 have been sent and are pending.  We will treat with Rocephin 1 g and Zithromax 500mg daily until cultures are resulted and adjust treatment accordingly.  DuoNebs q.6 hours while awake.  We will also monitor oxygen saturation with routine vital sign monitoring for any changes.  We will monitor CBC daily to trend elevated white count and bandemia.  Oxygen via nasal cannula protocol.      11/06/23 WBC 16.12  Continue rocephin and azithromycin, continue duonebs and supplemental o2  Chest xray shows bilateral opacities      11/07/23 dc home   Continue zithromycin and omnicef x 3 days   Follow up appt with Dr. Collins Caban Thursday 11/09/23 @ 1015    Dyspnea  11/5/23 - we will treat pneumonia with Rocephin and Zithromax.  DuoNebs q.6 hours while awake.  Routine vital sign monitoring wishes to include oxygen saturation.  Oxygen via nasal cannula protocol.      11/06/23 denies dyspnea  , o2 per nc at 2 liters with sat of 97%    Cardiac/Vascular  Chest pain  11/5/23 - EKG shows no STEMI.  Initial high sensitivity troponin noted to be 6.0. Delta troponin noted to be 5.9. We will repeat and adjust treatment accordingly.  Plans to resume home medications.  If patient is taking metoprolol currently, we will hold this medication at this time due to some mild hypotension.      1//06/23 complains of muscle pain when coughing    11/07/23 no complaints    Renal/  Hyponatremia  Patient has hyponatremia. We will monitor sodium Daily. The hyponatremia is due to Dehydration/hypovolemia. Patient did receive IV NS while in the ED. We will treat the hyponatremia  with IV fluids as follows: NS at 100 ml/hr. The patient's sodium results have been reviewed and are listed below.  Recent Labs   Lab 11/05/23  1017   *       11/06/23 NA has improved to 134    Hypomagnesemia  Patient has Abnormal Magnesium: hypomagnesemia. Will continue to monitor electrolytes closely. Patient did receive Magnesium 2g IV while in the ED. Will replace the affected electrolytes and repeat labs to be done after interventions completed. The patient's magnesium results have been reviewed and are listed below.  Recent Labs   Lab 11/05/23  1017   MG 1.1*        Hypokalemia  11/5/23 - initial potassium 3.3.  She was treated with potassium 40 mEq p.o..  This is likely due to her decreased oral intake.  Encourage p.o. intake.  Daily BMP and adjust treatment accordingly.    11/06/23 potassium is 4.0    Dehydration  11/5/23 - IV rehydration with normal saline at 125 mL/hour.  Patient has been experiencing decreased appetite but we will encourage p.o. intake.  We will recheck BMP daily and adjust treatment accordingly.      11/06/23 BUN 16 cr 0.98   Appetite is poor  Hydration improved       11/07 resolved      Final Active Diagnoses:    Diagnosis Date Noted POA    PRINCIPAL PROBLEM:  Pneumonia of both lungs due to infectious organism [J18.9] 11/05/2023 Yes    Dehydration [E86.0] 11/05/2023 Yes    Hypokalemia [E87.6] 11/05/2023 Yes    Hypomagnesemia [E83.42] 11/05/2023 Yes    Hyponatremia [E87.1] 11/05/2023 Yes    Chest pain [R07.9] 11/05/2023 Yes    Dyspnea [R06.00] 11/05/2023 Yes      Problems Resolved During this Admission:    Diagnosis Date Noted Date Resolved POA    Fatigue [R53.83] 11/05/2023 11/05/2023 Yes       Discharged Condition: stable    Disposition:     Follow Up:    Patient Instructions:   No discharge procedures on file.    Significant Diagnostic Studies: Labs: All labs within the past 24 hours have been reviewed    Pending Diagnostic Studies:     None          Medications:  Reconciled Home Medications:      Medication List      START taking these medications    acetaminophen 325 MG tablet  Commonly known as: TYLENOL  Take 2 tablets (650 mg total) by mouth every 4 (four) hours as needed for Pain or Temperature greater than.     azithromycin 250 MG tablet  Commonly known as: Z-TISH  Take 1 tablet (250 mg total) by mouth once daily. Take 2 tablets by mouth on day 1; Take 1 tablet by mouth on days 2-5 for 3 days     cefdinir 300 MG capsule  Commonly known as: OMNICEF  Take 1 capsule (300 mg total) by mouth 2 (two) times daily. for 3 days        CONTINUE taking these medications    aspirin 81 MG EC tablet  Commonly known as: ECOTRIN  Take 1 tablet (81 mg total) by mouth once daily.     atorvastatin 80 MG tablet  Commonly known as: LIPITOR  Take 1 tablet (80 mg total) by mouth once daily.     clopidogreL 75 mg tablet  Commonly known as: PLAVIX  Take 1 tablet (75 mg total) by mouth once daily.     metFORMIN 500 MG tablet  Commonly known as: GLUCOPHAGE  Take 1 tablet (500 mg total) by mouth 2 (two) times daily with meals.        STOP taking these medications    levETIRAcetam 500 MG Tab  Commonly known as: KEPPRA     metoprolol succinate 25 MG 24 hr tablet  Commonly known as: TOPROL-XL            Indwelling Lines/Drains at time of discharge:   Lines/Drains/Airways     None                 Time spent on the discharge of patient: 30 minutes         DIVYA Carr  Department of Hospital Medicine  [facility]

## 2023-11-07 NOTE — PLAN OF CARE
Problem: Adult Inpatient Plan of Care  Goal: Plan of Care Review  Outcome: Ongoing, Progressing  Goal: Patient-Specific Goal (Individualized)  Outcome: Ongoing, Progressing  Goal: Absence of Hospital-Acquired Illness or Injury  Outcome: Ongoing, Progressing  Goal: Optimal Comfort and Wellbeing  Outcome: Ongoing, Progressing     Problem: Fluid Imbalance (Pneumonia)  Goal: Fluid Balance  Outcome: Ongoing, Progressing     Problem: Infection (Pneumonia)  Goal: Resolution of Infection Signs and Symptoms  Outcome: Ongoing, Progressing     Problem: Respiratory Compromise (Pneumonia)  Goal: Effective Oxygenation and Ventilation  Outcome: Ongoing, Progressing     Problem: Fall Injury Risk  Goal: Absence of Fall and Fall-Related Injury  Outcome: Ongoing, Progressing     Problem: Breathing Pattern Ineffective  Goal: Effective Breathing Pattern  Outcome: Ongoing, Progressing     Problem: Gas Exchange Impaired  Goal: Optimal Gas Exchange  Outcome: Ongoing, Progressing

## 2023-11-07 NOTE — PLAN OF CARE
[facility] - Medical Surgical Unit  Discharge Final Note    Primary Care Provider: Collins Caban IV, DO    Expected Discharge Date: 11/7/2023    Final Discharge Note (most recent)       Final Note - 11/07/23 1015          Final Note    Assessment Type Final Discharge Note (P)      Anticipated Discharge Disposition Home or Self Care (P)      What phone number can be called within the next 1-3 days to see how you are doing after discharge? [phone] (P)      Hospital Resources/Appts/Education Provided Provided patient/caregiver with written discharge plan information;Appointments scheduled and added to AVS (P)         Post-Acute Status    Discharge Delays None known at this time (P)                      Important Message from Medicare        Ms. Higgins will discharge home today with her .  She will return to the clinic on Thursday, November 9, 2023 to follow up with Dr. Caban.

## 2023-11-11 LAB
BACTERIA BLD CULT: NORMAL
BACTERIA BLD CULT: NORMAL

## 2023-11-14 ENCOUNTER — TELEPHONE (OUTPATIENT)
Dept: MEDSURG UNIT | Facility: HOSPITAL | Age: 53
End: 2023-11-14

## 2023-11-27 RX ORDER — ASPIRIN 81 MG/1
81 TABLET ORAL DAILY
Qty: 90 TABLET | Refills: 3 | Status: SHIPPED | OUTPATIENT
Start: 2023-11-27 | End: 2024-11-26

## 2024-01-04 DIAGNOSIS — I24.9 ACS (ACUTE CORONARY SYNDROME): ICD-10-CM

## 2024-01-04 RX ORDER — CLOPIDOGREL BISULFATE 75 MG/1
75 TABLET ORAL DAILY
Qty: 30 TABLET | Refills: 0 | Status: SHIPPED | OUTPATIENT
Start: 2024-01-04 | End: 2024-01-09 | Stop reason: SDUPTHER

## 2024-01-04 NOTE — TELEPHONE ENCOUNTER
"Patient called and has an appointment on 1/9/24.  She stated that she had "run out" of her meds.  Stated that she had not had her Plavix in 1 month.  Dr. Erazo stated to give her a 1 month supply.   "

## 2024-01-07 NOTE — PROGRESS NOTES
PCP: Missy, Primary Doctor    Referring Provider:     Subjective:   Johana Higgins is a 53 y.o. female active smoker with hx of hypertension, diabetes and CAD status post PCI, who presents for follow up visit.    01/09/2024:  Presents for follow-up.  Note she run out of Plavix in early December 2023.   She notes intermittent chest pain/pressure and shortness of breath since 1 month.  When she called our office to inform that her Plavix has run out, she was prescribed Plavix and she notes that her symptoms seem to have improved since then.  She also complains of orthopnea, excessive coughing.  Endorses intermittent palpitations as well.  Extensively counseled regarding importance of maintaining follow up as well as adherence to medications.  She was noted to have URI symptoms and recommended going to urgent care/PCP to get flu and COVID swab.  Patient declined saying that her symptoms are stable and she does not want to spend the whole day waiting urgent care.  She notes that If she any issues she will put her PCP's office. Notes that she is not regularly following up with PCP. Advised about importance of that as well.    3/30/2023:  No-show for 1/3/2023 inpatient f/u with Oneyda Dick NP. Following this, re-scheduled for 3/7/23 with me- canceled/no show. No active complaints. DAPT changed to plavix by PCP due to cost issues and started on metformin for new-onset diabetes found inpatient. However, she has not been compliant with her medications including metformin. Extensively counseled re: medication adherence, following up in office and smoking cessation.  Denies any chest pain/tightness, dyspnea, palpitations, lightheadedness or syncope.     Hospitalized from 12/5 to 12/7/22 for NSTEMI:  Presented with chest pain with elevated troponin levels. The patient was seen at [facility] 12/3/22 with similar symptoms and elevated troponin [id number]->[id number]; leaving AMA before [facility]. She reports chest started Thursday 12/1/22  while at rest, self resolving. Later that night after work her symptoms returned. Today she begin having left side pain under her arm that radiated to her chest and into left jaw. Pain is described as tightness/pressure type pain, rated 7/10 on pain scale at worst. Denies alleviating or aggravating factors. No reported palpitations, n/v, SOB, diaphoresis, abdominal pain, or recent illness. History of smoking for 40 years and Marijuana use. Underwent LHC  which revealed 99% thrombotic occlusion of OM1 s/p JANENE x2. Discharged home In stable condition on ASA, ticagrelor, atorvastatin 80 mg daily and Toprol Xl 25 mg daily. Scheduled for IPF on Jan 3rd, 2023.       Fhx: Significant FH of CAD (father, brother had MI)  Shx: Active smoker    EKG  01/09/2024: Normal sinus rhythm  3/7/2023: Normal sinus rhythm, low voltage QRS in precordial leads     ECHO Results for orders placed during the hospital encounter of 12/03/22    Echo    Interpretation Summary  · The left ventricle is normal in size with normal systolic function.  · The estimated ejection fraction is 55%.  · Normal left ventricular diastolic function.  · Normal right ventricular size.  · Moderate mitral regurgitation.  · Moderate tricuspid regurgitation.  · There are segmental left ventricular wall motion abnormalities.    [facility]   Results for orders placed during the hospital encounter of 12/05/22    Cardiac catheterization    Conclusion    The Mid LAD lesion was 50% stenosed.    The 1st Mrg lesion was 99% stenosed with 0% stenosis post-intervention.    The left ventricular end diastolic pressure was normal.    The pre-procedure left ventricular end diastolic pressure was 6.    A stent was successfully placed at 12 JENNIFER for 9 sec.    A stent was successfully placed.    The estimated blood loss was none.    There was single vessel coronary artery disease.    Single vessel CAD ( 99% thrombotic occlusion of first OM branch - culprit)  Normal left sided filling pressures  - LVEDP 6mmHg  S/P successful PCI of first OM branch with two overlapping 2.75 x 18mm and 2.75 x 12mm Synergy JANENE.    Plan:  DAPT (aspirin and brillinta) - 1 year  Statin therapy  Risk factor modification and life-style changes    The procedure log was documented by Documenter: June Law RN and verified by Dennys Barakat MD.    Date: 12/6/2022  Time: 3:59 PM    Lab Results   Component Value Date     (L) 01/09/2024    K 4.9 01/09/2024     01/09/2024    CO2 31 01/09/2024    BUN 15 01/09/2024    CREATININE 0.82 01/09/2024    CALCIUM 9.9 01/09/2024    ANIONGAP 7 01/09/2024       Lab Results   Component Value Date    CHOL 234 (H) 12/03/2022     Lab Results   Component Value Date    HDL 50 12/03/2022     Lab Results   Component Value Date    LDLCALC 131 12/03/2022     Lab Results   Component Value Date    TRIG 264 (H) 12/03/2022     Lab Results   Component Value Date    CHOLHDL 4.7 12/03/2022       Lab Results   Component Value Date    WBC 7.86 01/09/2024    HGB 13.6 01/09/2024    HCT 42.2 01/09/2024    MCV 88.5 01/09/2024     01/09/2024           Current Outpatient Medications:     acetaminophen (TYLENOL) 325 MG tablet, Take 2 tablets (650 mg total) by mouth every 4 (four) hours as needed for Pain or Temperature greater than., Disp: , Rfl: 0    aspirin (ECOTRIN) 81 MG EC tablet, Take 1 tablet (81 mg total) by mouth once daily., Disp: 90 tablet, Rfl: 3    atorvastatin (LIPITOR) 80 MG tablet, Take 1 tablet (80 mg total) by mouth once daily., Disp: 90 tablet, Rfl: 3    clopidogreL (PLAVIX) 75 mg tablet, Take 1 tablet (75 mg total) by mouth once daily., Disp: 30 tablet, Rfl: 11    furosemide (LASIX) 20 MG tablet, Take 1 tablet (20 mg total) by mouth 2 (two) times daily., Disp: 60 tablet, Rfl: 11    Review of Systems   Constitutional:  Negative for chills, diaphoresis, fever and malaise/fatigue.   Respiratory:  Positive for shortness of breath. Negative for cough.    Cardiovascular:   "Positive for chest pain, palpitations and orthopnea. Negative for claudication, leg swelling and PND.   Gastrointestinal:  Negative for abdominal pain, heartburn, nausea and vomiting.   Neurological:  Negative for dizziness.         Objective:   /70 (BP Location: Right arm, Patient Position: Sitting)   Pulse 94   Ht 5' 3" (1.6 m)   Wt 61.1 kg (134 lb 12.8 oz)   SpO2 95%   BMI 23.88 kg/m²     Physical Exam  Constitutional:       General: She is not in acute distress.     Appearance: Normal appearance.   Cardiovascular:      Rate and Rhythm: Normal rate and regular rhythm.      Pulses: Normal pulses.      Heart sounds: Normal heart sounds. No murmur heard.     No friction rub. No gallop.   Pulmonary:      Effort: Pulmonary effort is normal.      Breath sounds: Normal breath sounds. No wheezing or rales.   Musculoskeletal:      Right lower leg: No edema.      Left lower leg: No edema.   Skin:     General: Skin is warm and dry.   Neurological:      Mental Status: She is alert.           Assessment:     1. Chest pain, unspecified type  Echo    EKG 12-lead    NM Myocardial Perfusion Spect Multi Pharmacologic    Nuclear Stress Test    Basic Metabolic Panel    CBC Auto Differential    NT-Pro Natriuretic Peptide    TSH    X-Ray Chest PA And Lateral    EKG 12-lead      2. Coronary artery disease involving native coronary artery of native heart with other form of angina pectoris  Basic Metabolic Panel    CBC Auto Differential    NT-Pro Natriuretic Peptide    TSH    X-Ray Chest PA And Lateral      3. Dyspnea on exertion  Basic Metabolic Panel    CBC Auto Differential    NT-Pro Natriuretic Peptide    TSH    X-Ray Chest PA And Lateral      4. ACS (acute coronary syndrome)  clopidogreL (PLAVIX) 75 mg tablet            Plan:   No problem-specific Assessment & Plan notes found for this encounter.    Coronary artery disease involving native coronary artery of native heart with angina  H/o NSTEMI in December 2022 S/p JANENE x2 " to OM1  Intermittent chest pain/pressure since 1 month    Plan:  - Schedule lexiscan  - Continue aspirin 81 mg daily and clopidogrel 75 mg daily  - Resume atorvastatin 80 mg daily  - Refills sent for all medications as above  - She has been off Toprol but will hold off for now given concern for CHF   - Extensively counseled re: medication adherence and follow-up  - Educated about calling office re: urgent cardiac issues   - Fasting lipid panel on day of stress test    Dyspnea on exertion (NYHA class II-III)  Suspected CHF  - check BMP, proBNP, TSH and CXR today   - noted proBNP elevation 3488 in Nov 2023 (admitted to [facility] shortness of breath and chest pain, hs-trop negative x2). She was treated for CAP. CXR with interstitial edema.  Suspect CHF was also contributing given elevated proBNP.  - Start scheduled Lasix 20 mg b.i.d.  - Schedule echo (12/55/2022 with normal LVEF and moderate MR but has had a change in clinical status since with new dyspnea on exertion and chest pain)    Smoking cessation  - Extensively counseled re: smoking cessation, declined NRT at previous visit. To be re-addressed at follow-up.    URI symptoms   - In addition to HF symptoms, alsp has rhinorrhea and sick contact. URI possible. Recommend COVID/Flu swab.  - Recommended going to urgent care/PCP to get flu and COVID swab.  Patient declined saying that her symptoms are stable and she does not want to spend the whole day waiting at urgent care.  She notes that If she any issues she will go to her PCP's office.     Follow-up in 6 weeks

## 2024-01-09 ENCOUNTER — LAB VISIT (OUTPATIENT)
Dept: LAB | Facility: CLINIC | Age: 54
End: 2024-01-09

## 2024-01-09 ENCOUNTER — HOSPITAL ENCOUNTER (OUTPATIENT)
Dept: RADIOLOGY | Facility: HOSPITAL | Age: 54
Discharge: HOME | End: 2024-01-09
Attending: STUDENT IN AN ORGANIZED HEALTH CARE EDUCATION/TRAINING PROGRAM

## 2024-01-09 ENCOUNTER — OFFICE VISIT (OUTPATIENT)
Dept: CARDIOLOGY | Facility: CLINIC | Age: 54
End: 2024-01-09

## 2024-01-09 VITALS
OXYGEN SATURATION: 95 % | HEIGHT: 63 IN | HEART RATE: 94 BPM | WEIGHT: 134.81 LBS | BODY MASS INDEX: 23.89 KG/M2 | SYSTOLIC BLOOD PRESSURE: 102 MMHG | DIASTOLIC BLOOD PRESSURE: 70 MMHG

## 2024-01-09 DIAGNOSIS — R06.09 DYSPNEA ON EXERTION: ICD-10-CM

## 2024-01-09 DIAGNOSIS — R07.9 CHEST PAIN, UNSPECIFIED TYPE: Primary | ICD-10-CM

## 2024-01-09 DIAGNOSIS — I24.9 ACS (ACUTE CORONARY SYNDROME): ICD-10-CM

## 2024-01-09 DIAGNOSIS — R07.9 CHEST PAIN, UNSPECIFIED TYPE: ICD-10-CM

## 2024-01-09 DIAGNOSIS — I25.118 CORONARY ARTERY DISEASE INVOLVING NATIVE CORONARY ARTERY OF NATIVE HEART WITH OTHER FORM OF ANGINA PECTORIS: ICD-10-CM

## 2024-01-09 LAB
ANION GAP SERPL CALCULATED.3IONS-SCNC: 7 MMOL/L (ref 7–16)
BASOPHILS # BLD AUTO: 0.09 K/UL (ref 0–0.2)
BASOPHILS NFR BLD AUTO: 1.1 % (ref 0–1)
BUN SERPL-MCNC: 15 MG/DL (ref 7–18)
BUN/CREAT SERPL: 18 (ref 6–20)
CALCIUM SERPL-MCNC: 9.9 MG/DL (ref 8.5–10.1)
CHLORIDE SERPL-SCNC: 101 MMOL/L (ref 98–107)
CO2 SERPL-SCNC: 31 MMOL/L (ref 21–32)
CREAT SERPL-MCNC: 0.82 MG/DL (ref 0.55–1.02)
DIFFERENTIAL METHOD BLD: ABNORMAL
EGFR (NO RACE VARIABLE) (RUSH/TITUS): 85 ML/MIN/1.73M2
EOSINOPHIL # BLD AUTO: 0.15 K/UL (ref 0–0.5)
EOSINOPHIL NFR BLD AUTO: 1.9 % (ref 1–4)
ERYTHROCYTE [DISTWIDTH] IN BLOOD BY AUTOMATED COUNT: 12.7 % (ref 11.5–14.5)
GLUCOSE SERPL-MCNC: 79 MG/DL (ref 74–106)
HCT VFR BLD AUTO: 42.2 % (ref 38–47)
HGB BLD-MCNC: 13.6 G/DL (ref 12–16)
IMM GRANULOCYTES # BLD AUTO: 0.03 K/UL (ref 0–0.04)
IMM GRANULOCYTES NFR BLD: 0.4 % (ref 0–0.4)
LYMPHOCYTES # BLD AUTO: 1.97 K/UL (ref 1–4.8)
LYMPHOCYTES NFR BLD AUTO: 25.1 % (ref 27–41)
MCH RBC QN AUTO: 28.5 PG (ref 27–31)
MCHC RBC AUTO-ENTMCNC: 32.2 G/DL (ref 32–36)
MCV RBC AUTO: 88.5 FL (ref 80–96)
MONOCYTES # BLD AUTO: 0.52 K/UL (ref 0–0.8)
MONOCYTES NFR BLD AUTO: 6.6 % (ref 2–6)
MPC BLD CALC-MCNC: 9.5 FL (ref 9.4–12.4)
NEUTROPHILS # BLD AUTO: 5.1 K/UL (ref 1.8–7.7)
NEUTROPHILS NFR BLD AUTO: 64.9 % (ref 53–65)
NRBC # BLD AUTO: 0 X10E3/UL
NRBC, AUTO (.00): 0 %
NT-PROBNP SERPL-MCNC: 50 PG/ML (ref 1–125)
PLATELET # BLD AUTO: 347 K/UL (ref 150–400)
POTASSIUM SERPL-SCNC: 4.9 MMOL/L (ref 3.5–5.1)
RBC # BLD AUTO: 4.77 M/UL (ref 4.2–5.4)
SODIUM SERPL-SCNC: 134 MMOL/L (ref 136–145)
TSH SERPL DL<=0.005 MIU/L-ACNC: 2.24 UIU/ML (ref 0.36–3.74)
WBC # BLD AUTO: 7.86 K/UL (ref 4.5–11)

## 2024-01-09 PROCEDURE — 93010 ELECTROCARDIOGRAM REPORT: CPT | Mod: S$PBB,,, | Performed by: STUDENT IN AN ORGANIZED HEALTH CARE EDUCATION/TRAINING PROGRAM

## 2024-01-09 PROCEDURE — 99214 OFFICE O/P EST MOD 30 MIN: CPT | Mod: PBBFAC,25 | Performed by: STUDENT IN AN ORGANIZED HEALTH CARE EDUCATION/TRAINING PROGRAM

## 2024-01-09 PROCEDURE — 99215 OFFICE O/P EST HI 40 MIN: CPT | Mod: S$PBB,,, | Performed by: STUDENT IN AN ORGANIZED HEALTH CARE EDUCATION/TRAINING PROGRAM

## 2024-01-09 PROCEDURE — 84443 ASSAY THYROID STIM HORMONE: CPT | Mod: ,,, | Performed by: CLINICAL MEDICAL LABORATORY

## 2024-01-09 PROCEDURE — 85025 COMPLETE CBC W/AUTO DIFF WBC: CPT | Mod: ,,, | Performed by: CLINICAL MEDICAL LABORATORY

## 2024-01-09 PROCEDURE — 71046 X-RAY EXAM CHEST 2 VIEWS: CPT | Mod: TC

## 2024-01-09 PROCEDURE — 83880 ASSAY OF NATRIURETIC PEPTIDE: CPT | Mod: ,,, | Performed by: CLINICAL MEDICAL LABORATORY

## 2024-01-09 PROCEDURE — 71046 X-RAY EXAM CHEST 2 VIEWS: CPT | Mod: 26,,, | Performed by: STUDENT IN AN ORGANIZED HEALTH CARE EDUCATION/TRAINING PROGRAM

## 2024-01-09 PROCEDURE — 80048 BASIC METABOLIC PNL TOTAL CA: CPT | Mod: ,,, | Performed by: CLINICAL MEDICAL LABORATORY

## 2024-01-09 PROCEDURE — 93005 ELECTROCARDIOGRAM TRACING: CPT | Mod: PBBFAC | Performed by: STUDENT IN AN ORGANIZED HEALTH CARE EDUCATION/TRAINING PROGRAM

## 2024-01-09 PROCEDURE — 36415 COLL VENOUS BLD VENIPUNCTURE: CPT | Mod: ,,, | Performed by: CLINICAL MEDICAL LABORATORY

## 2024-01-09 RX ORDER — FUROSEMIDE 20 MG/1
20 TABLET ORAL 2 TIMES DAILY
Qty: 60 TABLET | Refills: 11 | Status: SHIPPED | OUTPATIENT
Start: 2024-01-09 | End: 2025-01-08

## 2024-01-09 RX ORDER — CLOPIDOGREL BISULFATE 75 MG/1
75 TABLET ORAL DAILY
Qty: 30 TABLET | Refills: 11 | Status: SHIPPED | OUTPATIENT
Start: 2024-01-09 | End: 2025-01-03

## 2024-01-09 RX ORDER — ATORVASTATIN CALCIUM 80 MG/1
80 TABLET, FILM COATED ORAL DAILY
Qty: 90 TABLET | Refills: 3 | Status: SHIPPED | OUTPATIENT
Start: 2024-01-09 | End: 2025-01-08

## 2024-01-09 NOTE — PATIENT INSTRUCTIONS
Labs and CXR today   Echo/stress test scheduled for January 22, 2024 at 8:00 am  Resume atorvastatin 40 mg daily   Continue aspirin and plavix  Start lasix 20 mg daily twice a day  Fasting labs on day of stress   Follow-up in 6 weeks

## 2024-02-05 PROBLEM — J18.9 PNEUMONIA OF BOTH LUNGS DUE TO INFECTIOUS ORGANISM: Status: RESOLVED | Noted: 2023-11-05 | Resolved: 2024-02-05

## 2024-03-19 ENCOUNTER — HOSPITAL ENCOUNTER (OUTPATIENT)
Dept: CARDIOLOGY | Facility: HOSPITAL | Age: 54
Discharge: HOME | End: 2024-03-19
Attending: STUDENT IN AN ORGANIZED HEALTH CARE EDUCATION/TRAINING PROGRAM
Payer: COMMERCIAL

## 2024-03-19 ENCOUNTER — HOSPITAL ENCOUNTER (OUTPATIENT)
Dept: RADIOLOGY | Facility: HOSPITAL | Age: 54
Discharge: HOME | End: 2024-03-19
Attending: STUDENT IN AN ORGANIZED HEALTH CARE EDUCATION/TRAINING PROGRAM
Payer: COMMERCIAL

## 2024-03-19 VITALS
BODY MASS INDEX: 23.74 KG/M2 | HEIGHT: 63 IN | HEART RATE: 83 BPM | DIASTOLIC BLOOD PRESSURE: 78 MMHG | SYSTOLIC BLOOD PRESSURE: 141 MMHG

## 2024-03-19 VITALS — WEIGHT: 134 LBS | HEIGHT: 63 IN | BODY MASS INDEX: 23.74 KG/M2

## 2024-03-19 DIAGNOSIS — R06.09 DYSPNEA ON EXERTION: ICD-10-CM

## 2024-03-19 DIAGNOSIS — R07.9 CHEST PAIN, UNSPECIFIED TYPE: ICD-10-CM

## 2024-03-19 DIAGNOSIS — I25.118 CORONARY ARTERY DISEASE INVOLVING NATIVE CORONARY ARTERY OF NATIVE HEART WITH OTHER FORM OF ANGINA PECTORIS: ICD-10-CM

## 2024-03-19 PROCEDURE — 93018 CV STRESS TEST I&R ONLY: CPT | Mod: ,,, | Performed by: STUDENT IN AN ORGANIZED HEALTH CARE EDUCATION/TRAINING PROGRAM

## 2024-03-19 PROCEDURE — 93017 CV STRESS TEST TRACING ONLY: CPT

## 2024-03-19 PROCEDURE — 78452 HT MUSCLE IMAGE SPECT MULT: CPT | Mod: TC

## 2024-03-19 PROCEDURE — 78452 HT MUSCLE IMAGE SPECT MULT: CPT | Mod: 26,,, | Performed by: STUDENT IN AN ORGANIZED HEALTH CARE EDUCATION/TRAINING PROGRAM

## 2024-03-19 PROCEDURE — 63600175 PHARM REV CODE 636 W HCPCS: Performed by: STUDENT IN AN ORGANIZED HEALTH CARE EDUCATION/TRAINING PROGRAM

## 2024-03-19 PROCEDURE — 93306 TTE W/DOPPLER COMPLETE: CPT | Mod: 26,,, | Performed by: STUDENT IN AN ORGANIZED HEALTH CARE EDUCATION/TRAINING PROGRAM

## 2024-03-19 PROCEDURE — A9500 TC99M SESTAMIBI: HCPCS | Performed by: STUDENT IN AN ORGANIZED HEALTH CARE EDUCATION/TRAINING PROGRAM

## 2024-03-19 PROCEDURE — 93016 CV STRESS TEST SUPVJ ONLY: CPT | Mod: ,,, | Performed by: STUDENT IN AN ORGANIZED HEALTH CARE EDUCATION/TRAINING PROGRAM

## 2024-03-19 PROCEDURE — 93306 TTE W/DOPPLER COMPLETE: CPT

## 2024-03-19 RX ORDER — TETRAKIS(2-METHOXYISOBUTYLISOCYANIDE)COPPER(I) TETRAFLUOROBORATE 1 MG/ML
36 INJECTION, POWDER, LYOPHILIZED, FOR SOLUTION INTRAVENOUS
Status: COMPLETED | OUTPATIENT
Start: 2024-03-19 | End: 2024-03-19

## 2024-03-19 RX ORDER — REGADENOSON 0.08 MG/ML
0.4 INJECTION, SOLUTION INTRAVENOUS ONCE
Status: COMPLETED | OUTPATIENT
Start: 2024-03-19 | End: 2024-03-19

## 2024-03-19 RX ORDER — TETRAKIS(2-METHOXYISOBUTYLISOCYANIDE)COPPER(I) TETRAFLUOROBORATE 1 MG/ML
10.4 INJECTION, POWDER, LYOPHILIZED, FOR SOLUTION INTRAVENOUS
Status: COMPLETED | OUTPATIENT
Start: 2024-03-19 | End: 2024-03-19

## 2024-03-19 RX ADMIN — KIT FOR THE PREPARATION OF TECHNETIUM TC99M SESTAMIBI 36 MILLICURIE: 1 INJECTION, POWDER, LYOPHILIZED, FOR SOLUTION PARENTERAL at 10:03

## 2024-03-19 RX ADMIN — KIT FOR THE PREPARATION OF TECHNETIUM TC99M SESTAMIBI 10.4 MILLICURIE: 1 INJECTION, POWDER, LYOPHILIZED, FOR SOLUTION PARENTERAL at 08:03

## 2024-03-19 RX ADMIN — REGADENOSON 0.4 MG: 0.08 INJECTION, SOLUTION INTRAVENOUS at 10:03

## 2024-03-20 LAB
AORTIC ROOT ANNULUS: 1.89 CM
AORTIC VALVE CUSP SEPERATION: 1.51 CM
AV INDEX (PROSTH): 0.75
AV MEAN GRADIENT: 3 MMHG
AV PEAK GRADIENT: 5 MMHG
AV VALVE AREA BY VELOCITY RATIO: 2.02 CM²
AV VALVE AREA: 2.12 CM²
AV VELOCITY RATIO: 0.71
BSA FOR ECHO PROCEDURE: 1.64 M2
CV ECHO LV RWT: 0.43 CM
CV STRESS BASE HR: 83 BPM
DIASTOLIC BLOOD PRESSURE: 78 MMHG
DOP CALC AO PEAK VEL: 1.12 M/S
DOP CALC AO VTI: 21.7 CM
DOP CALC LVOT AREA: 2.8 CM2
DOP CALC LVOT DIAMETER: 1.9 CM
DOP CALC LVOT PEAK VEL: 0.8 M/S
DOP CALC LVOT STROKE VOLUME: 45.91 CM3
DOP CALCLVOT PEAK VEL VTI: 16.2 CM
E WAVE DECELERATION TIME: 229.66 MSEC
E/A RATIO: 1.27
E/E' RATIO: 7.73 M/S
ECHO LV POSTERIOR WALL: 0.85 CM (ref 0.6–1.1)
FRACTIONAL SHORTENING: 30 % (ref 28–44)
INTERVENTRICULAR SEPTUM: 0.65 CM (ref 0.6–1.1)
IVC DIAMETER: 1.36 CM
LEFT ATRIUM SIZE: 2.6 CM
LEFT ATRIUM VOLUME INDEX MOD: 14.9 ML/M2
LEFT ATRIUM VOLUME MOD: 24.36 CM3
LEFT INTERNAL DIMENSION IN SYSTOLE: 2.76 CM (ref 2.1–4)
LEFT VENTRICLE DIASTOLIC VOLUME INDEX: 41.66 ML/M2
LEFT VENTRICLE DIASTOLIC VOLUME: 67.91 ML
LEFT VENTRICLE MASS INDEX: 52 G/M2
LEFT VENTRICLE SYSTOLIC VOLUME INDEX: 17.4 ML/M2
LEFT VENTRICLE SYSTOLIC VOLUME: 28.41 ML
LEFT VENTRICULAR INTERNAL DIMENSION IN DIASTOLE: 3.95 CM (ref 3.5–6)
LEFT VENTRICULAR MASS: 84.01 G
LV LATERAL E/E' RATIO: 7.73 M/S
LV SEPTAL E/E' RATIO: 7.73 M/S
LVOT MG: 1.23 MMHG
LVOT MV: 0.5 CM/S
MV PEAK A VEL: 0.67 M/S
MV PEAK E VEL: 0.85 M/S
OHS CV CPX 85 PERCENT MAX PREDICTED HEART RATE MALE: 140
OHS CV CPX MAX PREDICTED HEART RATE: 165
OHS CV CPX PATIENT IS FEMALE: 1
OHS CV CPX PATIENT IS MALE: 0
OHS CV CPX PEAK DIASTOLIC BLOOD PRESSURE: 78 MMHG
OHS CV CPX PEAK HEAR RATE: 105 BPM
OHS CV CPX PEAK RATE PRESSURE PRODUCT: NORMAL
OHS CV CPX PEAK SYSTOLIC BLOOD PRESSURE: 141 MMHG
OHS CV CPX PERCENT MAX PREDICTED HEART RATE ACHIEVED: 67
OHS CV CPX RATE PRESSURE PRODUCT PRESENTING: NORMAL
OHS LV EJECTION FRACTION SIMPSONS BIPLANE MOD: 58 %
PISA TR MAX VEL: 2.63 M/S
PV PEAK GRADIENT: 3 MMHG
PV PEAK VELOCITY: 0.82 M/S
RA PRESSURE ESTIMATED: 3 MMHG
RA VOL SYS: 23.85 ML
RIGHT ATRIAL AREA: 10 CM2
RIGHT VENTRICLE DIASTOLIC LENGTH: 6.1 CM
RIGHT VENTRICLE DIASTOLIC MID DIMENSION: 1.8 CM
RIGHT VENTRICULAR END-DIASTOLIC DIMENSION: 2.8 CM
RIGHT VENTRICULAR LENGTH IN DIASTOLE (APICAL 4-CHAMBER VIEW): 6.14 CM
RV MID DIAMA: 1.76 CM
RV TB RVSP: 6 MMHG
SYSTOLIC BLOOD PRESSURE: 141 MMHG
TDI LATERAL: 0.11 M/S
TDI SEPTAL: 0.11 M/S
TDI: 0.11 M/S
TR MAX PG: 28 MMHG
TRICUSPID ANNULAR PLANE SYSTOLIC EXCURSION: 2.06 CM
TV REST PULMONARY ARTERY PRESSURE: 31 MMHG
Z-SCORE OF LEFT VENTRICULAR DIMENSION IN END DIASTOLE: -1.52
Z-SCORE OF LEFT VENTRICULAR DIMENSION IN END SYSTOLE: -0.26

## 2024-04-03 ENCOUNTER — TELEPHONE (OUTPATIENT)
Dept: CARDIOLOGY | Facility: CLINIC | Age: 54
End: 2024-04-03
Payer: COMMERCIAL

## 2024-04-03 NOTE — TELEPHONE ENCOUNTER
----- Message from Keisha Erazo MD sent at 4/1/2024 10:59 AM CDT -----  Please inform stress test and echo were normal.  Unable to reach patient, her  was informed of normal findings.  He stated that he would tell her and get her to call for another appointment.

## 2024-10-24 ENCOUNTER — OFFICE VISIT (OUTPATIENT)
Dept: FAMILY MEDICINE | Facility: CLINIC | Age: 54
End: 2024-10-24
Payer: COMMERCIAL

## 2024-10-24 VITALS
BODY MASS INDEX: 25.34 KG/M2 | DIASTOLIC BLOOD PRESSURE: 79 MMHG | HEIGHT: 63 IN | WEIGHT: 143 LBS | SYSTOLIC BLOOD PRESSURE: 125 MMHG | HEART RATE: 71 BPM

## 2024-10-24 DIAGNOSIS — R53.1 WEAKNESS: ICD-10-CM

## 2024-10-24 DIAGNOSIS — E11.9 TYPE 2 DIABETES MELLITUS WITHOUT COMPLICATION, WITHOUT LONG-TERM CURRENT USE OF INSULIN: Primary | Chronic | ICD-10-CM

## 2024-10-24 DIAGNOSIS — R53.83 FATIGUE, UNSPECIFIED TYPE: ICD-10-CM

## 2024-10-24 DIAGNOSIS — Z12.31 BREAST CANCER SCREENING BY MAMMOGRAM: ICD-10-CM

## 2024-10-24 NOTE — ASSESSMENT & PLAN NOTE
This is a chronic problem for patient with likely progression.  Patient has experienced some weakness and fatigue.  It has been approximately 1 year since last hemoglobin A1c and laboratory evaluation should include A1c at a minimum.  Along with this we will be getting a microalbumin creatinine ratio

## 2024-10-24 NOTE — PROGRESS NOTES
"              Collins Caban IV, DO  [facility]  [address] [place] [address]  Phone: [phone]      Subjective     Name: Johana Higgins   Sex: female  YOB: 1970 (54 y.o.)  MRN: [id number]  Visit Date: 10/24/2024   Chief Complaint: Annual Exam and Fatigue        HISTORY OF PRESENT ILLNESS:    Chief Complaint   Patient presents with    Annual Exam    Fatigue       HPI  See tests that keep you healthy and the problem oriented documentation below.    Tests to Keep You Healthy    Mammogram: ORDERED BUT NOT SCHEDULED  Eye Exam: DUE  Colon Cancer Screening: DUE  Cervical Cancer Screening: DUE  Last HbA1c < 8 (11/05/2023): Yes  Tobacco Cessation: NO      Portions of this note may have been created with voice recognition software. Occasional "wrong-word" or "sound-a-like" substitutions may have occurred due to the inherent limitations of voice recognition software. Please, read the note carefully and recognize, using context, where substitutions have occurred.     PAST MEDICAL HISTORY:  Significant Diagnoses - Patient  has a past medical history of Coronary artery disease, Diabetes mellitus, Hyperlipidemia, and Hypertension.  Medications - Patient has a current medication list which includes the following long-term medication(s): atorvastatin, clopidogrel, and furosemide.   Allergies - Patient is allergic to penicillin and codeine.  Surgeries - Patient  has a past surgical history that includes ANGIOGRAM, CORONARY, WITH LEFT HEART CATHETERIZATION (N/A, 12/6/2022).  Family History - Patient family history includes Heart attack in her brother, father, and maternal grandfather; Heart disease in her paternal grandfather.      SOCIAL HISTORY:  Tobacco - Patient  reports that she has been smoking cigarettes. She has been exposed to tobacco smoke. She does not have any smokeless tobacco history on file.   Alcohol - Patient  reports no history of alcohol use.   Recreational " "Drugs - Patient  reports current drug use. Drug: Marijuana.       Review of Systems   All other systems reviewed and are negative.       Past Medical History:   Diagnosis Date    Coronary artery disease     Diabetes mellitus     Hyperlipidemia     Hypertension         Review of patient's allergies indicates:   Allergen Reactions    Penicillin Hives    Codeine Nausea And Vomiting        Past Surgical History:   Procedure Laterality Date    ANGIOGRAM, CORONARY, WITH LEFT HEART CATHETERIZATION N/A 12/6/2022    Procedure: Angiogram, Coronary, with Left Heart Cath;  Surgeon: Dennys Barakat MD;  [facility] C[facility] LAB;  Service: Cardiology;  Laterality: N/A;        Family History   Problem Relation Name Age of Onset    Heart attack Father      Heart attack Brother      Heart attack Maternal Grandfather      Heart disease Paternal Grandfather         Current Outpatient Medications   Medication Instructions    atorvastatin (LIPITOR) 80 mg, Oral, Daily    clopidogreL (PLAVIX) 75 mg, Oral, Daily    furosemide (LASIX) 20 mg, Oral, 2 times daily        Objective     /79   Pulse 71   Ht 5' 3" (1.6 m)   Wt 64.9 kg (143 lb)   BMI 25.33 kg/m²     Physical Exam  Constitutional:       General: She is not in acute distress.     Appearance: Normal appearance. She is not ill-appearing.   HENT:      Head: Normocephalic and atraumatic.      Right Ear: External ear normal.      Left Ear: External ear normal.   Eyes:      Extraocular Movements: Extraocular movements intact.      Conjunctiva/sclera: Conjunctivae normal.   Cardiovascular:      Rate and Rhythm: Normal rate.      Heart sounds: No murmur heard.  Pulmonary:      Effort: Pulmonary effort is normal.   Musculoskeletal:      Cervical back: Normal range of motion.   Skin:     General: Skin is warm and dry.      Coloration: Skin is not jaundiced.      Findings: No rash.   Neurological:      Mental Status: She is alert.   Psychiatric:         Mood and " Affect: Mood normal.        All recently obtained labs have been reviewed and discussed in detail with the patient.   Assessment     1. Type 2 diabetes mellitus without complication, without long-term current use of insulin    2. Breast cancer screening by mammogram    3. Weakness    4. Fatigue, unspecified type         Plan        Problem List Items Addressed This Visit       Type 2 diabetes mellitus without complication, without long-term current use of insulin - Primary (Chronic)     This is a chronic problem for patient with likely progression.  Patient has experienced some weakness and fatigue.  It has been approximately 1 year since last hemoglobin A1c and laboratory evaluation should include A1c at a minimum.  Along with this we will be getting a microalbumin creatinine ratio         Relevant Orders    Hemoglobin A1C    Comprehensive Metabolic Panel    CBC Auto Differential    TSH    T4, Free    Microalbumin/Creatinine Ratio, Urine     Other Visit Diagnoses       Breast cancer screening by mammogram        Relevant Orders    Mammo Digital Screening Bilat w/ Russell    Weakness        Fatigue, unspecified type        Undiagnosed new problem with unclear prognosis.  CBC, CMP, TSH, free T4            No follow-ups on file.    Patient advised that is symptoms worsen, they should call or report directly to local emergency department.    Collins Caban, DO  [facility]

## 2024-11-04 ENCOUNTER — LAB VISIT (OUTPATIENT)
Dept: LAB | Facility: HOSPITAL | Age: 54
End: 2024-11-04
Attending: FAMILY MEDICINE
Payer: COMMERCIAL

## 2024-11-04 DIAGNOSIS — E11.9 TYPE 2 DIABETES MELLITUS WITHOUT COMPLICATION, WITHOUT LONG-TERM CURRENT USE OF INSULIN: ICD-10-CM

## 2024-11-04 LAB
ALBUMIN SERPL BCP-MCNC: 3.6 G/DL (ref 3.5–5)
ALBUMIN/GLOB SERPL: 1 {RATIO}
ALP SERPL-CCNC: 82 U/L (ref 46–118)
ALT SERPL W P-5'-P-CCNC: 24 U/L (ref 13–56)
ANION GAP SERPL CALCULATED.3IONS-SCNC: 9 MMOL/L (ref 7–16)
AST SERPL W P-5'-P-CCNC: 16 U/L (ref 15–37)
BASOPHILS # BLD AUTO: 0.06 K/UL (ref 0–0.2)
BASOPHILS NFR BLD AUTO: 1.1 % (ref 0–1)
BILIRUB SERPL-MCNC: 0.3 MG/DL (ref ?–1.2)
BUN SERPL-MCNC: 13 MG/DL (ref 7–18)
BUN/CREAT SERPL: 12 (ref 6–20)
CALCIUM SERPL-MCNC: 9.4 MG/DL (ref 8.5–10.1)
CHLORIDE SERPL-SCNC: 105 MMOL/L (ref 98–107)
CO2 SERPL-SCNC: 28 MMOL/L (ref 21–32)
CREAT SERPL-MCNC: 1.09 MG/DL (ref 0.55–1.02)
DIFFERENTIAL METHOD BLD: ABNORMAL
EGFR (NO RACE VARIABLE) (RUSH/TITUS): 60 ML/MIN/1.73M2
EOSINOPHIL # BLD AUTO: 0.08 K/UL (ref 0–0.5)
EOSINOPHIL NFR BLD AUTO: 1.4 % (ref 1–4)
ERYTHROCYTE [DISTWIDTH] IN BLOOD BY AUTOMATED COUNT: 13.2 % (ref 11.5–14.5)
EST. AVERAGE GLUCOSE BLD GHB EST-MCNC: 134 MG/DL
GLOBULIN SER-MCNC: 3.7 G/DL (ref 2–4)
GLUCOSE SERPL-MCNC: 122 MG/DL (ref 74–106)
HBA1C MFR BLD HPLC: 6.3 % (ref 4.5–6.6)
HCT VFR BLD AUTO: 40.9 % (ref 38–47)
HGB BLD-MCNC: 12.9 G/DL (ref 12–16)
IMM GRANULOCYTES # BLD AUTO: 0.02 K/UL (ref 0–0.04)
IMM GRANULOCYTES NFR BLD: 0.4 % (ref 0–0.4)
LYMPHOCYTES # BLD AUTO: 1.67 K/UL (ref 1–4.8)
LYMPHOCYTES NFR BLD AUTO: 30.1 % (ref 27–41)
MCH RBC QN AUTO: 28.5 PG (ref 27–31)
MCHC RBC AUTO-ENTMCNC: 31.5 G/DL (ref 32–36)
MCV RBC AUTO: 90.5 FL (ref 80–96)
MONOCYTES # BLD AUTO: 0.36 K/UL (ref 0–0.8)
MONOCYTES NFR BLD AUTO: 6.5 % (ref 2–6)
MPC BLD CALC-MCNC: 9.1 FL (ref 9.4–12.4)
NEUTROPHILS # BLD AUTO: 3.36 K/UL (ref 1.8–7.7)
NEUTROPHILS NFR BLD AUTO: 60.5 % (ref 53–65)
NRBC # BLD AUTO: 0 X10E3/UL
NRBC, AUTO (.00): 0 %
PLATELET # BLD AUTO: 337 K/UL (ref 150–400)
POTASSIUM SERPL-SCNC: 3.6 MMOL/L (ref 3.5–5.1)
PROT SERPL-MCNC: 7.3 G/DL (ref 6.4–8.2)
RBC # BLD AUTO: 4.52 M/UL (ref 4.2–5.4)
SODIUM SERPL-SCNC: 138 MMOL/L (ref 136–145)
T4 FREE SERPL-MCNC: 1.04 NG/DL (ref 0.76–1.46)
TSH SERPL DL<=0.005 MIU/L-ACNC: 2.5 UIU/ML (ref 0.36–3.74)
WBC # BLD AUTO: 5.55 K/UL (ref 4.5–11)

## 2024-11-04 PROCEDURE — 84443 ASSAY THYROID STIM HORMONE: CPT

## 2024-11-04 PROCEDURE — 84439 ASSAY OF FREE THYROXINE: CPT

## 2024-11-04 PROCEDURE — 83036 HEMOGLOBIN GLYCOSYLATED A1C: CPT

## 2024-11-04 PROCEDURE — 36415 COLL VENOUS BLD VENIPUNCTURE: CPT

## 2024-11-04 PROCEDURE — 85025 COMPLETE CBC W/AUTO DIFF WBC: CPT

## 2024-11-04 PROCEDURE — 82570 ASSAY OF URINE CREATININE: CPT

## 2024-11-04 PROCEDURE — 80053 COMPREHEN METABOLIC PANEL: CPT

## 2024-11-05 LAB
CREAT UR-MCNC: 312 MG/DL (ref 28–219)
MICROALBUMIN UR-MCNC: 1.7 MG/DL (ref 0–2.8)
MICROALBUMIN/CREAT RATIO PNL UR: 5.4 MG/G (ref 0–30)

## 2024-11-07 ENCOUNTER — PATIENT MESSAGE (OUTPATIENT)
Dept: FAMILY MEDICINE | Facility: CLINIC | Age: 54
End: 2024-11-07
Payer: COMMERCIAL

## 2024-11-12 ENCOUNTER — HOSPITAL ENCOUNTER (EMERGENCY)
Facility: HOSPITAL | Age: 54
Discharge: HOME | End: 2024-11-12
Payer: COMMERCIAL

## 2024-11-12 VITALS
TEMPERATURE: 98 F | SYSTOLIC BLOOD PRESSURE: 141 MMHG | BODY MASS INDEX: 25.48 KG/M2 | DIASTOLIC BLOOD PRESSURE: 82 MMHG | RESPIRATION RATE: 18 BRPM | OXYGEN SATURATION: 96 % | HEIGHT: 63 IN | HEART RATE: 90 BPM | WEIGHT: 143.81 LBS

## 2024-11-12 DIAGNOSIS — S61.208A AVULSION OF SKIN OF INDEX FINGER, INITIAL ENCOUNTER: Primary | ICD-10-CM

## 2024-11-12 PROCEDURE — 99282 EMERGENCY DEPT VISIT SF MDM: CPT | Mod: ,,, | Performed by: NURSE PRACTITIONER

## 2024-11-12 PROCEDURE — 99282 EMERGENCY DEPT VISIT SF MDM: CPT

## 2024-11-13 NOTE — ED PROVIDER NOTES
Encounter Date: 11/12/2024       History     Chief Complaint   Patient presents with    Finger Injury     Pt reports 3 days ago she cut her right index finger on broken jelly jar.  Pt reports she is on plavix and couldn't stop the bleeding.     The history is provided by the patient.     Review of patient's allergies indicates:   Allergen Reactions    Penicillin Hives    Codeine Nausea And Vomiting     Past Medical History:   Diagnosis Date    Coronary artery disease     Diabetes mellitus     Hyperlipidemia     Hypertension      Past Surgical History:   Procedure Laterality Date    ANGIOGRAM, CORONARY, WITH LEFT HEART CATHETERIZATION N/A 12/6/2022    Procedure: Angiogram, Coronary, with Left Heart Cath;  Surgeon: Dennys Barakat MD;  Location: [facility] CATH LAB;  Service: Cardiology;  Laterality: N/A;     Family History   Problem Relation Name Age of Onset    Heart attack Father      Heart attack Brother      Heart attack Maternal Grandfather      Heart disease Paternal Grandfather       Social History     Tobacco Use    Smoking status: Every Day     Types: Cigarettes     Passive exposure: Current   Substance Use Topics    Alcohol use: Never    Drug use: Yes     Types: Marijuana     Review of Systems   Constitutional:  Negative for fever.   HENT:  Negative for sore throat.    Respiratory:  Negative for shortness of breath.    Cardiovascular:  Negative for chest pain.   Gastrointestinal:  Negative for nausea.   Genitourinary:  Negative for dysuria.   Musculoskeletal:  Negative for back pain.   Skin:  Positive for wound. Negative for rash.   Neurological:  Negative for weakness.   Hematological:  Does not bruise/bleed easily.       Physical Exam     Initial Vitals [11/12/24 2039]   BP Pulse Resp Temp SpO2   (!) 141/82 90 18 98.3 °F (36.8 °C) 96 %      MAP       --         Physical Exam    Nursing note and vitals reviewed.  Constitutional: She appears well-developed and well-nourished. She is not diaphoretic. She  is cooperative.  Non-toxic appearance. She does not have a sickly appearance. She does not appear ill. No distress.   HENT:   Head: Normocephalic and atraumatic.   Eyes: Pupils are equal, round, and reactive to light.   Neck: Neck supple.   Cardiovascular:  Normal rate, regular rhythm, normal heart sounds and intact distal pulses.     Exam reveals no gallop and no friction rub.       No murmur heard.  Pulmonary/Chest: Breath sounds normal. No respiratory distress. She has no wheezes. She has no rhonchi. She has no rales. She exhibits no tenderness.   Musculoskeletal:      Cervical back: Neck supple.     Neurological: She is alert and oriented to person, place, and time.   Skin: Skin is warm and dry. Capillary refill takes less than 2 seconds.        Psychiatric: She has a normal mood and affect.         Medical Screening Exam   See Full Note    ED Course   Procedures  Labs Reviewed - No data to display       Imaging Results    None          Medications - No data to display  Medical Decision Making  I cleaned the wound using saline and chlorhexidine.  No bleeding.  I trimmed the small flap off.  I apply surgicel dressing over the wound with 2x2.  I wrapped with kerlix and reviewed discharge instructions and wound care with the patient.     Problems Addressed:  Avulsion of skin of index finger, initial encounter: self-limited or minor problem               ED Course as of 11/12/24 2052 Tue Nov 12, 2024 2051 Pt refusing tetanus shot.   [AG]      ED Course User Index  [AG] Nannette Caban FNP                           Clinical Impression:   Final diagnoses:  [S61.208A] Avulsion of skin of index finger, initial encounter - right (Primary)        ED Disposition Condition    Discharge Stable          ED Prescriptions    None       Follow-up Information       Follow up With Specialties Details Why Contact Info    [facility] RAUL [facility]  Schedule an appointment as soon as possible for a visit in 1 week As  needed [address] [place] [id number]  [phone]             Nannette Caban, [facility]  11/12/24 2052

## 2024-11-13 NOTE — DISCHARGE INSTRUCTIONS
Leave this dressing on for 24 hours.  Clean the wound twice daily using over the counter antibacterial soap (such as dial soap) and water.  Do not use hydrogen peroxide or rubbing alcohol to clean the wound.  Return to the ER for redness, swelling, purulent drainage or fever.

## 2024-11-18 ENCOUNTER — TELEPHONE (OUTPATIENT)
Dept: FAMILY MEDICINE | Facility: CLINIC | Age: 54
End: 2024-11-18
Payer: COMMERCIAL

## 2024-11-18 DIAGNOSIS — Z12.12 ENCOUNTER FOR COLORECTAL CANCER SCREENING USING COLOGUARD TEST: Primary | ICD-10-CM

## 2024-11-18 DIAGNOSIS — Z12.11 ENCOUNTER FOR COLORECTAL CANCER SCREENING USING COLOGUARD TEST: Primary | ICD-10-CM

## 2025-01-22 ENCOUNTER — TELEPHONE (OUTPATIENT)
Dept: CARDIOLOGY | Facility: CLINIC | Age: 55
End: 2025-01-22
Payer: COMMERCIAL

## 2025-01-22 NOTE — TELEPHONE ENCOUNTER
----- Message from Alem sent at 2025  1:28 PM CST -----  Who Called: Johana Higgins    Refill or New Rx:Refill  RX Name and Strength: furosemide (LASIX) 20 MG tablet  How is the patient currently taking it? (ex. 1XDay): Take 1 tablet (20 mg total) by mouth 2 (two) times daily  Is this a 30 day or 90 day RX: 30  Local or Mail Order: local  List of preferred pharmacies on file (remove unneeded): [facility] - [place], [place] - [address] [facility] [address]  [address] [place] [address]  Phone: [phone] Fax: [phone]      Ordering Provider: Kacey      Preferred Method of Contact: Phone Call  Patient's Preferred Phone Number on File: [phone]     Additional Information: pt needs refills on medication. New prescription due to the  on this month. Please contact pt back for more info

## 2025-01-23 ENCOUNTER — OFFICE VISIT (OUTPATIENT)
Dept: CARDIOLOGY | Facility: CLINIC | Age: 55
End: 2025-01-23
Payer: COMMERCIAL

## 2025-01-23 VITALS
HEIGHT: 63 IN | HEART RATE: 91 BPM | BODY MASS INDEX: 25.69 KG/M2 | WEIGHT: 145 LBS | OXYGEN SATURATION: 97 % | SYSTOLIC BLOOD PRESSURE: 130 MMHG | DIASTOLIC BLOOD PRESSURE: 80 MMHG

## 2025-01-23 DIAGNOSIS — I25.118 CORONARY ARTERY DISEASE INVOLVING NATIVE CORONARY ARTERY OF NATIVE HEART WITH OTHER FORM OF ANGINA PECTORIS: Primary | ICD-10-CM

## 2025-01-23 PROCEDURE — 93005 ELECTROCARDIOGRAM TRACING: CPT | Mod: PBBFAC | Performed by: STUDENT IN AN ORGANIZED HEALTH CARE EDUCATION/TRAINING PROGRAM

## 2025-01-23 PROCEDURE — 3079F DIAST BP 80-89 MM HG: CPT | Mod: CPTII,,, | Performed by: STUDENT IN AN ORGANIZED HEALTH CARE EDUCATION/TRAINING PROGRAM

## 2025-01-23 PROCEDURE — 99999 PR PBB SHADOW E&M-EST. PATIENT-LVL IV: CPT | Mod: PBBFAC,,, | Performed by: STUDENT IN AN ORGANIZED HEALTH CARE EDUCATION/TRAINING PROGRAM

## 2025-01-23 PROCEDURE — 3008F BODY MASS INDEX DOCD: CPT | Mod: CPTII,,, | Performed by: STUDENT IN AN ORGANIZED HEALTH CARE EDUCATION/TRAINING PROGRAM

## 2025-01-23 PROCEDURE — 93010 ELECTROCARDIOGRAM REPORT: CPT | Mod: S$PBB,,, | Performed by: STUDENT IN AN ORGANIZED HEALTH CARE EDUCATION/TRAINING PROGRAM

## 2025-01-23 PROCEDURE — 99214 OFFICE O/P EST MOD 30 MIN: CPT | Mod: PBBFAC,25 | Performed by: STUDENT IN AN ORGANIZED HEALTH CARE EDUCATION/TRAINING PROGRAM

## 2025-01-23 PROCEDURE — 99214 OFFICE O/P EST MOD 30 MIN: CPT | Mod: S$PBB,,, | Performed by: STUDENT IN AN ORGANIZED HEALTH CARE EDUCATION/TRAINING PROGRAM

## 2025-01-23 PROCEDURE — 1159F MED LIST DOCD IN RCRD: CPT | Mod: CPTII,,, | Performed by: STUDENT IN AN ORGANIZED HEALTH CARE EDUCATION/TRAINING PROGRAM

## 2025-01-23 PROCEDURE — 3075F SYST BP GE 130 - 139MM HG: CPT | Mod: CPTII,,, | Performed by: STUDENT IN AN ORGANIZED HEALTH CARE EDUCATION/TRAINING PROGRAM

## 2025-01-23 RX ORDER — FUROSEMIDE 20 MG/1
20 TABLET ORAL 2 TIMES DAILY
Qty: 180 TABLET | Refills: 3 | Status: SHIPPED | OUTPATIENT
Start: 2025-01-23 | End: 2026-01-23

## 2025-01-23 RX ORDER — NITROGLYCERIN 0.4 MG/1
0.4 TABLET SUBLINGUAL EVERY 5 MIN PRN
Qty: 30 TABLET | Refills: 0 | Status: SHIPPED | OUTPATIENT
Start: 2025-01-23

## 2025-01-23 RX ORDER — CLOPIDOGREL BISULFATE 75 MG/1
75 TABLET ORAL DAILY
Qty: 90 TABLET | Refills: 3 | Status: SHIPPED | OUTPATIENT
Start: 2025-01-23 | End: 2026-01-18

## 2025-01-23 RX ORDER — ATORVASTATIN CALCIUM 80 MG/1
80 TABLET, FILM COATED ORAL DAILY
Qty: 90 TABLET | Refills: 3 | Status: SHIPPED | OUTPATIENT
Start: 2025-01-23 | End: 2026-01-23

## 2025-01-23 NOTE — PATIENT INSTRUCTIONS
Take aspirin 81 mg daily   Medication refills sent   Sublingual nitroglycerin as needed for chest pain  Follow-up in 3 months

## 2025-01-24 ENCOUNTER — PATIENT MESSAGE (OUTPATIENT)
Dept: CARDIOLOGY | Facility: CLINIC | Age: 55
End: 2025-01-24
Payer: COMMERCIAL

## 2025-01-26 LAB
OHS QRS DURATION: 70 MS
OHS QTC CALCULATION: 440 MS

## 2025-01-27 NOTE — PROGRESS NOTES
PCP: Missy, Primary Doctor    Referring Provider:     Subjective:   Johana Higgins is a 54 y.o. female active smoker with hx of hypertension, diabetes and CAD status post PCI, who presents for follow up visit.    1/23/25: c/o sharp chest pain this past few days. Notes she has been under increased stress due to her  being hospitalized. Notes pain is different in character from when she had her MI. Notes she was off medications for 1-2 weeks except ASA 81 mg daily as her pharmacy would not fill it due to her running out of her refills end of this month.    01/09/2024:  Presents for follow-up.  Note she run out of Plavix in early December 2023.   She notes intermittent chest pain/pressure and shortness of breath since 1 month.  When she called our office to inform that her Plavix has run out, she was prescribed Plavix and she notes that her symptoms seem to have improved since then.  She also complains of orthopnea, excessive coughing.  Endorses intermittent palpitations as well.  Extensively counseled regarding importance of maintaining follow up as well as adherence to medications.  She was noted to have URI symptoms and recommended going to urgent care/PCP to get flu and COVID swab.  Patient declined saying that her symptoms are stable and she does not want to spend the whole day waiting urgent care.  She notes that If she any issues she will put her PCP's office. Notes that she is not regularly following up with PCP. Advised about importance of that as well.    3/30/2023:  No-show for 1/3/2023 inpatient f/u with Oneyda Dick NP. Following this, re-scheduled for 3/7/23 with me- canceled/no show. No active complaints. DAPT changed to plavix by PCP due to cost issues and started on metformin for new-onset diabetes found inpatient. However, she has not been compliant with her medications including metformin. Extensively counseled re: medication adherence, following up in office and smoking cessation.  Denies any  chest pain/tightness, dyspnea, palpitations, lightheadedness or syncope.     Hospitalized from 12/5 to 12/7/22 for NSTEMI:  Presented with chest pain with elevated troponin levels. The patient was seen at [facility] 12/3/22 with similar symptoms and elevated troponin [id number]->[id number]->[id number]; leaving AMA before LHC. She reports chest started Thursday 12/1/22 while at rest, self resolving. Later that night after work her symptoms returned. Today she begin having left side pain under her arm that radiated to her chest and into left jaw. Pain is described as tightness/pressure type pain, rated 7/10 on pain scale at worst. Denies alleviating or aggravating factors. No reported palpitations, n/v, SOB, diaphoresis, abdominal pain, or recent illness. History of smoking for 40 years and Marijuana use. Underwent LHC  which revealed 99% thrombotic occlusion of OM1 s/p JANENE x2. Discharged home In stable condition on ASA, ticagrelor, atorvastatin 80 mg daily and Toprol Xl 25 mg daily. Scheduled for IPF on Jan 3rd, 2023.       Fhx: Significant FH of CAD (father, brother had MI)  Shx: Active smoker    EKG  01/23/2025: Normal sinus rhythm  01/09/2024: Normal sinus rhythm  3/7/2023: Normal sinus rhythm, low voltage QRS in precordial leads     ECHO      Results for orders placed during the hospital encounter of 03/19/24    Echo    Interpretation Summary    Left Ventricle: The left ventricle is normal in size. Normal wall thickness. There is concentric remodeling. There is normal systolic function with a visually estimated ejection fraction of 55 - 60%. Biplane (2D) method of discs ejection fraction is 58%. There is normal diastolic function.    Right Ventricle: Normal right ventricular cavity size. Wall thickness is normal. Right ventricle wall motion  is normal. Systolic function is normal.    Aortic Valve: The aortic valve is a trileaflet valve.    Tricuspid Valve: There is mild regurgitation.    Pulmonary Artery: The estimated pulmonary  artery systolic pressure is 31 mmHg.    IVC/SVC: Normal venous pressure at 3 mmHg.     Results for orders placed during the hospital encounter of 12/03/22    Echo    Interpretation Summary  · The left ventricle is normal in size with normal systolic function.  · The estimated ejection fraction is 55%.  · Normal left ventricular diastolic function.  · Normal right ventricular size.  · Moderate mitral regurgitation.  · Moderate tricuspid regurgitation.  · There are segmental left ventricular wall motion abnormalities.    [facility]   Results for orders placed during the hospital encounter of 12/05/22    Cardiac catheterization    Conclusion    The Mid LAD lesion was 50% stenosed.    The 1st Mrg lesion was 99% stenosed with 0% stenosis post-intervention.    The left ventricular end diastolic pressure was normal.    The pre-procedure left ventricular end diastolic pressure was 6.    A stent was successfully placed at 12 JENNIFER for 9 sec.    A stent was successfully placed.    The estimated blood loss was none.    There was single vessel coronary artery disease.    Single vessel CAD ( 99% thrombotic occlusion of first OM branch - culprit)  Normal left sided filling pressures - LVEDP 6mmHg  S/P successful PCI of first OM branch with two overlapping 2.75 x 18mm and 2.75 x 12mm Synergy JANENE.    Plan:  DAPT (aspirin and brillinta) - 1 year  Statin therapy  Risk factor modification and life-style changes    The procedure log was documented by Documenter: June Law RN and verified by Dennys Barakat MD.    Date: 12/6/2022  Time: 3:59 PM    Lab Results   Component Value Date     11/04/2024    K 3.6 11/04/2024     11/04/2024    CO2 28 11/04/2024    BUN 13 11/04/2024    CREATININE 1.09 (H) 11/04/2024    CALCIUM 9.4 11/04/2024    ANIONGAP 9 11/04/2024       Lab Results   Component Value Date    CHOL 234 (H) 12/03/2022     Lab Results   Component Value Date    HDL 50 12/03/2022     Lab Results   Component Value Date  "   LDLCALC 131 12/03/2022     Lab Results   Component Value Date    TRIG 264 (H) 12/03/2022     Lab Results   Component Value Date    CHOLHDL 4.7 12/03/2022       Lab Results   Component Value Date    WBC 5.55 11/04/2024    HGB 12.9 11/04/2024    HCT 40.9 11/04/2024    MCV 90.5 11/04/2024     11/04/2024           Current Outpatient Medications:     atorvastatin (LIPITOR) 80 MG tablet, Take 1 tablet (80 mg total) by mouth once daily., Disp: 90 tablet, Rfl: 3    clopidogreL (PLAVIX) 75 mg tablet, Take 1 tablet (75 mg total) by mouth once daily., Disp: 90 tablet, Rfl: 3    furosemide (LASIX) 20 MG tablet, Take 1 tablet (20 mg total) by mouth 2 (two) times daily., Disp: 180 tablet, Rfl: 3    nitroGLYCERIN (NITROSTAT) 0.4 MG SL tablet, Place 1 tablet (0.4 mg total) under the tongue every 5 (five) minutes as needed for Chest pain (x 3 doses. Please go to the ER if chest pain persits after 3rd dose of nitroglycerin)., Disp: 30 tablet, Rfl: 0    Review of Systems   Constitutional:  Negative for chills, diaphoresis, fever and malaise/fatigue.   Respiratory:  Positive for shortness of breath. Negative for cough.    Cardiovascular:  Positive for chest pain, palpitations and orthopnea. Negative for claudication, leg swelling and PND.   Gastrointestinal:  Negative for abdominal pain, heartburn, nausea and vomiting.   Neurological:  Negative for dizziness.         Objective:   /80 (BP Location: Right arm, Patient Position: Sitting)   Pulse 91   Ht 5' 3" (1.6 m)   Wt 65.8 kg (145 lb)   SpO2 97%   BMI 25.69 kg/m²     Physical Exam  Constitutional:       General: She is not in acute distress.     Appearance: Normal appearance.   Cardiovascular:      Rate and Rhythm: Normal rate and regular rhythm.      Pulses: Normal pulses.      Heart sounds: Normal heart sounds. No murmur heard.     No friction rub. No gallop.   Pulmonary:      Effort: Pulmonary effort is normal.      Breath sounds: Normal breath sounds. No " wheezing or rales.   Musculoskeletal:      Right lower leg: No edema.      Left lower leg: No edema.   Skin:     General: Skin is warm and dry.   Neurological:      Mental Status: She is alert.           Assessment:     1. Coronary artery disease involving native coronary artery of native heart with other form of angina pectoris  EKG 12-lead    EKG 12-lead      2. ACS (acute coronary syndrome)  clopidogreL (PLAVIX) 75 mg tablet            Plan:   No problem-specific Assessment & Plan notes found for this encounter.    Coronary artery disease involving native coronary artery of native heart with other form angina  H/o NSTEMI in December 2022 S/p JANENE x2 to OM1  Intermittent  atypical chest pain x 1 week. EKG NSR  Lexiscan was negative for ischemia     Plan:  - Take aspirin 81 mg daily (pt taking aspirin 650 mg per report for unclear reasons) and clopidogrel 75 mg daily  - Continue atorvastatin 80 mg daily  - Refills sent for all cardiac medications  - Sublingual nitroglycerin as needed for chest pain    Smoking cessation  - Extensively counseled re: smoking cessation      Follow-up in 3 months

## 2025-01-28 ENCOUNTER — HOSPITAL ENCOUNTER (EMERGENCY)
Facility: HOSPITAL | Age: 55
Discharge: HOME | End: 2025-01-28
Payer: COMMERCIAL

## 2025-01-28 VITALS
BODY MASS INDEX: 25.7 KG/M2 | DIASTOLIC BLOOD PRESSURE: 69 MMHG | OXYGEN SATURATION: 94 % | WEIGHT: 145.06 LBS | HEIGHT: 63 IN | RESPIRATION RATE: 20 BRPM | TEMPERATURE: 99 F | SYSTOLIC BLOOD PRESSURE: 118 MMHG | HEART RATE: 94 BPM

## 2025-01-28 DIAGNOSIS — J10.1 INFLUENZA A: Primary | ICD-10-CM

## 2025-01-28 LAB
INFLUENZA A MOLECULAR (OHS): POSITIVE
INFLUENZA B MOLECULAR (OHS): NEGATIVE

## 2025-01-28 PROCEDURE — 87502 INFLUENZA DNA AMP PROBE: CPT | Performed by: NURSE PRACTITIONER

## 2025-01-28 PROCEDURE — 99283 EMERGENCY DEPT VISIT LOW MDM: CPT | Mod: ,,, | Performed by: NURSE PRACTITIONER

## 2025-01-28 PROCEDURE — 99282 EMERGENCY DEPT VISIT SF MDM: CPT

## 2025-01-29 NOTE — ED PROVIDER NOTES
Encounter Date: 1/28/2025       History     Chief Complaint   Patient presents with    Fever     States she ran 103 last night - has been medicating    Cough     Dry cough started yesterday    Nasal Congestion     Congestion started yesterday     Patient presents to the ED with complaints of congestion, cough and fever for 2 days.     The history is provided by the patient.     Review of patient's allergies indicates:   Allergen Reactions    Penicillin Hives    Codeine Nausea And Vomiting     Past Medical History:   Diagnosis Date    Coronary artery disease     Diabetes mellitus     Hyperlipidemia     Hypertension      Past Surgical History:   Procedure Laterality Date    ANGIOGRAM, CORONARY, WITH LEFT HEART CATHETERIZATION N/A 12/6/2022    Procedure: Angiogram, Coronary, with Left Heart Cath;  Surgeon: Dennys Barakat MD;  Location: [facility] CATH LAB;  Service: Cardiology;  Laterality: N/A;     Family History   Problem Relation Name Age of Onset    Heart attack Father      Heart attack Brother      Heart attack Maternal Grandfather      Heart disease Paternal Grandfather       Social History     Tobacco Use    Smoking status: Every Day     Types: Cigarettes     Passive exposure: Current   Substance Use Topics    Alcohol use: Never    Drug use: Yes     Types: Marijuana     Review of Systems   Constitutional:  Positive for fever.   HENT:  Positive for congestion.    Respiratory:  Positive for cough.    Cardiovascular: Negative.    Musculoskeletal: Negative.    Neurological: Negative.    Psychiatric/Behavioral: Negative.     All other systems reviewed and are negative.      Physical Exam     Initial Vitals [01/28/25 1726]   BP Pulse Resp Temp SpO2   118/69 94 20 98.8 °F (37.1 °C) (!) 94 %      MAP       --         Physical Exam    Vitals reviewed.  Constitutional: She appears well-developed and well-nourished.   Cardiovascular:  Normal rate, regular rhythm, normal heart sounds and intact distal pulses.            Pulmonary/Chest: Breath sounds normal.   Musculoskeletal:         General: Normal range of motion.     Neurological: She is alert and oriented to person, place, and time. She has normal strength. GCS score is 15. GCS eye subscore is 4. GCS verbal subscore is 5. GCS motor subscore is 6.   Skin: Skin is warm and dry. Capillary refill takes less than 2 seconds.   Psychiatric: She has a normal mood and affect. Her behavior is normal. Judgment and thought content normal.         Medical Screening Exam   See Full Note    ED Course   Procedures  Labs Reviewed   INFLUENZA A & B BY MOLECULAR - Abnormal       Result Value    INFLUENZA A MOLECULAR Positive (*)     INFLUENZA B MOLECULAR  Negative            Imaging Results    None          Medications - No data to display  Medical Decision Making  MDM    Patient presents for emergent evaluation of acute cough and congestion that poses a threat to life and/or bodily function.    In the ED patient found to have acute flu.      Discharge MDM  I discussed the treatment and discharge plan with the patient.  Patient was discharged in stable condition.  Detailed return precautions discussed.                                      Clinical Impression:   Final diagnoses:  [J10.1] Influenza A (Primary)        ED Disposition Condition    Discharge Stable          ED Prescriptions    None       Follow-up Information    None          Becki Atnonio, DIVYA  01/28/25 1958

## (undated) DEVICE — GUIDE VISTA 6FR XB 3

## (undated) DEVICE — GUIDEWIRE J .035X260CM

## (undated) DEVICE — CONTRAST ISOVUE 370 100ML

## (undated) DEVICE — GUIDEWIRE RUNTHROUGH EF 180CM

## (undated) DEVICE — CATH DIAG 6F FL3.5 100CM

## (undated) DEVICE — Device

## (undated) DEVICE — SET IV PRIMARY

## (undated) DEVICE — CHLORAPREP 10.5 ML APPLICATOR

## (undated) DEVICE — STOPCOCK 3 WAY MED PRESSURE

## (undated) DEVICE — DECANTER FLUID TRNSF WHITE 9IN

## (undated) DEVICE — BAND TR COMP DEVICE REG 24CM

## (undated) DEVICE — DRESSING TRANS 4X4 TEGADERM

## (undated) DEVICE — DEVICE BLUE DIAMOND INFL 20ML

## (undated) DEVICE — KIT CO-PILOT

## (undated) DEVICE — DRAPE ANGIO BRACH 38X44IN

## (undated) DEVICE — KIT HEART ANGIO MFLD LEFT RUSH

## (undated) DEVICE — GLOVE PROTEXIS PI CRM 5.5

## (undated) DEVICE — KIT GLIDESHEATH SLEND 6FR 10CM

## (undated) DEVICE — PROTECTOR ULNAR NERVE FOAM

## (undated) DEVICE — OXISENSOR ADULT DIGIT N/S

## (undated) DEVICE — GUIDE WIRE WHOLLY FLOPPY

## (undated) DEVICE — SET EXTENSION CLEARLINK 2INJ

## (undated) DEVICE — GLOVE SURG BIOGEL LATEX SZ 7.5

## (undated) DEVICE — ETCO2 NC MICROSTR FEM ST ADLT

## (undated) DEVICE — CATH DIAG JACKY 3.5 6FRX110CM